# Patient Record
Sex: FEMALE | Race: WHITE | NOT HISPANIC OR LATINO | Employment: FULL TIME | URBAN - METROPOLITAN AREA
[De-identification: names, ages, dates, MRNs, and addresses within clinical notes are randomized per-mention and may not be internally consistent; named-entity substitution may affect disease eponyms.]

---

## 2022-09-25 LAB
ABO/RH(D): NORMAL
ANTIBODY SCREEN: NEGATIVE
SPECIMEN EXPIRATION DATE: NORMAL
SPECIMEN EXPIRATION DATE: NORMAL

## 2022-09-26 ENCOUNTER — DOCUMENTATION ONLY (OUTPATIENT)
Dept: MIDWIFE SERVICES | Facility: CLINIC | Age: 27
End: 2022-09-26

## 2022-09-26 ENCOUNTER — PRENATAL OFFICE VISIT (OUTPATIENT)
Dept: MIDWIFE SERVICES | Facility: CLINIC | Age: 27
End: 2022-09-26
Payer: COMMERCIAL

## 2022-09-26 ENCOUNTER — HOSPITAL ENCOUNTER (OUTPATIENT)
Dept: ULTRASOUND IMAGING | Facility: CLINIC | Age: 27
Discharge: HOME | End: 2022-09-26
Attending: MIDWIFE | Admitting: MIDWIFE
Payer: COMMERCIAL

## 2022-09-26 VITALS
DIASTOLIC BLOOD PRESSURE: 66 MMHG | BODY MASS INDEX: 31.7 KG/M2 | WEIGHT: 185.7 LBS | SYSTOLIC BLOOD PRESSURE: 106 MMHG | HEART RATE: 68 BPM | HEIGHT: 64 IN

## 2022-09-26 DIAGNOSIS — Z34.00 SUPERVISION OF NORMAL FIRST PREGNANCY, ANTEPARTUM: Primary | ICD-10-CM

## 2022-09-26 DIAGNOSIS — Z34.00 SUPERVISION OF NORMAL FIRST PREGNANCY, ANTEPARTUM: ICD-10-CM

## 2022-09-26 DIAGNOSIS — Z34.01 ENCOUNTER FOR SUPERVISION OF NORMAL FIRST PREGNANCY IN FIRST TRIMESTER: ICD-10-CM

## 2022-09-26 LAB
BASOPHILS # BLD AUTO: 0 10E3/UL (ref 0–0.2)
BASOPHILS NFR BLD AUTO: 0 %
EOSINOPHIL # BLD AUTO: 0.1 10E3/UL (ref 0–0.7)
EOSINOPHIL NFR BLD AUTO: 1 %
ERYTHROCYTE [DISTWIDTH] IN BLOOD BY AUTOMATED COUNT: 12.1 % (ref 10–15)
HBA1C MFR BLD: 5.3 %
HCT VFR BLD AUTO: 40.4 % (ref 35–47)
HGB BLD-MCNC: 13.7 G/DL (ref 11.7–15.7)
IMM GRANULOCYTES # BLD: 0.1 10E3/UL
IMM GRANULOCYTES NFR BLD: 1 %
LYMPHOCYTES # BLD AUTO: 2.7 10E3/UL (ref 0.8–5.3)
LYMPHOCYTES NFR BLD AUTO: 24 %
MCH RBC QN AUTO: 29.3 PG (ref 26.5–33)
MCHC RBC AUTO-ENTMCNC: 33.9 G/DL (ref 31.5–36.5)
MCV RBC AUTO: 87 FL (ref 78–100)
MONOCYTES # BLD AUTO: 0.8 10E3/UL (ref 0–1.3)
MONOCYTES NFR BLD AUTO: 7 %
NEUTROPHILS # BLD AUTO: 7.3 10E3/UL (ref 1.6–8.3)
NEUTROPHILS NFR BLD AUTO: 67 %
NRBC # BLD AUTO: 0 10E3/UL
NRBC BLD AUTO-RTO: 0 /100
PLATELET # BLD AUTO: 373 10E3/UL (ref 150–450)
RBC # BLD AUTO: 4.67 10E6/UL (ref 3.8–5.2)
WBC # BLD AUTO: 10.9 10E3/UL (ref 4–11)

## 2022-09-26 PROCEDURE — 86900 BLOOD TYPING SEROLOGIC ABO: CPT | Performed by: MIDWIFE

## 2022-09-26 PROCEDURE — 87591 N.GONORRHOEAE DNA AMP PROB: CPT | Performed by: MIDWIFE

## 2022-09-26 PROCEDURE — 86780 TREPONEMA PALLIDUM: CPT | Performed by: MIDWIFE

## 2022-09-26 PROCEDURE — 85025 COMPLETE CBC W/AUTO DIFF WBC: CPT | Performed by: MIDWIFE

## 2022-09-26 PROCEDURE — 87491 CHLMYD TRACH DNA AMP PROBE: CPT | Performed by: MIDWIFE

## 2022-09-26 PROCEDURE — 86901 BLOOD TYPING SEROLOGIC RH(D): CPT | Performed by: MIDWIFE

## 2022-09-26 PROCEDURE — 87389 HIV-1 AG W/HIV-1&-2 AB AG IA: CPT | Performed by: MIDWIFE

## 2022-09-26 PROCEDURE — 76801 OB US < 14 WKS SINGLE FETUS: CPT

## 2022-09-26 PROCEDURE — 99207 PR FIRST OB VISIT: CPT | Performed by: MIDWIFE

## 2022-09-26 PROCEDURE — 83036 HEMOGLOBIN GLYCOSYLATED A1C: CPT | Performed by: MIDWIFE

## 2022-09-26 PROCEDURE — 86762 RUBELLA ANTIBODY: CPT | Performed by: MIDWIFE

## 2022-09-26 PROCEDURE — G0145 SCR C/V CYTO,THINLAYER,RESCR: HCPCS | Performed by: MIDWIFE

## 2022-09-26 PROCEDURE — 86850 RBC ANTIBODY SCREEN: CPT | Performed by: MIDWIFE

## 2022-09-26 PROCEDURE — 36415 COLL VENOUS BLD VENIPUNCTURE: CPT | Performed by: MIDWIFE

## 2022-09-26 PROCEDURE — 86803 HEPATITIS C AB TEST: CPT | Performed by: MIDWIFE

## 2022-09-26 PROCEDURE — 87340 HEPATITIS B SURFACE AG IA: CPT | Performed by: MIDWIFE

## 2022-09-26 PROCEDURE — 87086 URINE CULTURE/COLONY COUNT: CPT | Performed by: MIDWIFE

## 2022-09-26 PROCEDURE — 99205 OFFICE O/P NEW HI 60 MIN: CPT | Performed by: MIDWIFE

## 2022-09-26 NOTE — PROGRESS NOTES
"PRENATAL VISIT   FIRST OBSTETRICAL EXAM - OB     Assessment / Impression   First prenatal visit at 10w3d  27yo       Last pap = 2019  BMI 32  EDPS = 3, \"0\" to #10    Plan:      -IOB labs drawn.   -Pt is not a candidate for drawing lead level per [facility] screening tool.   -Patient is interested in waterbirth.    -Reviewed prenatal care schedule.   -Optimal nutrition and weight gain discussed. Pregnancy weight gain of 11-20 lbs (BMI 30.0 or 34.9) encouraged.   -Anticipatory guidance for common pregnancy questions and concerns reviewed.   -Danger s/sx for this trimester reviewed with patient.   -Reviewed genetic screening options with patient, patient does elect for first trimester screening. The patient does not elect for quad screening.   -Reviewed carrier testing options with patient, patient does not elect for testing or referral to genetic counseling.  -IOB packet given and reviewed with patient.   -CNM services and hospital options reviewed; emergency and scheduling phone numbers given to patient.   -Because the patient does have 2 or more moderate risk factors, low-dose aspirin will be initiated at 12 weeks.   -Antepartum VTE risk factors absent.  -Patient is at increased risk for overt diabetes, so A1C will be added to IOB labs today.  -Pt is not a candidate for an antepartum OB consult.    -Return to clinic in 4 weeks or sooner as needed.    Total time: 75 minutes spent on the date of the encounter doing chart review, review of test results, patient visit and documentation.     Subjective:   Mirian Cordova is a 26 year old  here today for her first obstetrical exam at 10w3d. Here with  Dewayne. This pregnancy is planned Attempting pregnancy for 1 months. The patient reports nausea, but no vomiting, fatigue and some mild breast tenderness. She has a certain LMP Patient's last menstrual period was 07/15/2022 (exact date)., predicting an expected date of delivery of Estimated Date of Delivery: " Data Unavailable. Last period was normal. Her previous three cycles were absent due to hormonal IUD. Her pregnancy is dated by LMP.     The patient states that she is in a mutually monogamous relationship and states that she is safe. Offered GC/CT screening today and patient accepts.  Current symptoms also include: fatigue, frequent urination and nausea.     Risk factors: pre-pregnancy obesity. Pregnancy Risk Factors:patient reports the use of recreation drugs (THC and mushrooms) prior to positive pregnancy test       The patient has the following high risk factors for preeclampsia:none. The patient has the following moderate risk factors for preeclampsia:first pregnancy and BMI greater than 30.     The patient has the following antepartum risk factors for VTE (two or more risk factors, or 1 * risk factor, places patient at higher risk): current or anticipated antepartum bedrest/prolonged immobility > 24 hours and none.   Clinical history/risk factors requiring antepartum OB consult: none.     The patient has the following risk factors for overt diabetes: Body mass index greater than or equal to 25 kg/m2. Plus the additional risk factor(s) of: First-degree relative with diabetes (brother).     Social History:   Education level: post graduate   Occupation: speech & language pathologist   Partners name: Dewayne   ?   OB History    Para Term  AB Living   1 0 0 0 0 0   SAB IAB Ectopic Multiple Live Births   0 0 0 0 0      # Outcome Date GA Lbr Pedro/2nd Weight Sex Delivery Anes PTL Lv   1 Current                 History:   No past medical history on file.   Past Surgical History:   Procedure Laterality Date     wisdom teeth Bilateral       No family history on file.   Social History     Tobacco Use     Smoking status: Never Smoker     Smokeless tobacco: Never Used      Current Outpatient Medications   Medication Sig Dispense Refill     Prenatal Vit-Fe Fumarate-FA (PRENATAL VITAMIN PO) Take 1 split tablet  "by mouth daily        No Known Allergies     The patient's medical, surgical and family histories were reviewed and were pertinent to this visit.     Pap smear: Last Pap: 2019, Result: normal, Previous History: +HPV, laser surgery 2012       EPDS score today: 3.\"0\" to #10   History of anxiety or depression: yes    Review of Systems   General: Fatigue but otherwise denies problem   Eyes: Denies problem   Ears/Nose/Throat: Denies problem   Cardiovascular: Denies problem   Respiratory: Denies problem   Gastrointestinal: Nausea without vomiting, occasional constipation  Genitourinary: Denies any discharge, vaginal bleeding or itchiness or any other problem   Musculoskeletal: Breast tenderness otherwise denies problem   Skin: Denies problem   Neurologic: Denies problem   Psychiatric: Denies problem   Endocrine: Denies problem         Objective:   Objective    Vitals:    09/26/22 1546   BP: 106/66   Pulse: 68   Weight: 84.2 kg (185 lb 11.2 oz)   Height: 1.613 m (5' 3.5\")        Physical Exam:   General Appearance: Alert, cooperative, no distress, appears stated age   HEENT: Normocephalic, without obvious abnormality, atraumatic. Conjunctiva/corneas clear  Neck: Supple, symmetrical, trachea midline, no adenopathy.   Thyroid: not enlarged, symmetric, no tenderness/mass/nodules   Back: Symmetric, no curvature, ROM normal  Lungs: Clear to auscultation bilaterally, respirations unlabored   Heart: Regular rate and rhythm, S1 and S2 normal, no murmur,No edema to lower extremities.   Breasts: No breast masses, tenderness, asymmetry, or nipple discharge. Nipples are everted.   Abdomen: Gravid, soft, non-tender, bowel sounds active all four quadrants, no masses.   FHT: not audible   Vulva:  no sign of lesions or condyloma, normal hair distribution   Vagina: pink, normal rugae, copious white discharge  Cervix:  long/thick/closed, no lesions or inflammation noted, negative CMT with exam   Uterus: retroverted position, non tender, " enlarged approximately 10 weeks size   Adnexa:  no masses appreciated, non-tender with palpation   Musculoskeletal: Extremities normal, atraumatic, no cyanosis   Skin: Skin color, texture, turgor normal, no rashes or lesions   Lymph nodes: Cervical, supraclavicular, and axillary nodes normal   Neurologic: Alert and oriented x 3. Normal speech   Lab:   Results for orders placed or performed in visit on 09/26/22   CBC with Platelets & Differential     Status: None (In process)    Narrative    The following orders were created for panel order CBC with Platelets & Differential.  Procedure                               Abnormality         Status                     ---------                               -----------         ------                     CBC with platelets and d...[[id number]]                      In process                   Please view results for these tests on the individual orders.           Plan of care:  - Follow up ultrasound due to inaudible FHTs  - Planned f/u in 4 weeks for routine prenatal  - Patient encouraged to pursue flu vaccine & covid booster  - Plan to start ASA at 12 weeks due to 2 mod risk factors  - Follow up on utox, may need at next visit if unable to add on to IOB labs

## 2022-09-27 LAB
AMPHETAMINES UR QL SCN: ABNORMAL
BARBITURATES UR QL SCN: ABNORMAL
BENZODIAZ UR QL SCN: ABNORMAL
BZE UR QL SCN: ABNORMAL
CANNABINOIDS UR QL SCN: ABNORMAL
CREAT UR-MCNC: 158 MG/DL
HBV SURFACE AG SERPL QL IA: NONREACTIVE
HCV AB SERPL QL IA: NONREACTIVE
HIV 1+2 AB+HIV1 P24 AG SERPL QL IA: NONREACTIVE
OPIATES UR QL SCN: ABNORMAL
OXYCODONE UR QL: ABNORMAL
PCP QUAL URINE (ROCHE): ABNORMAL
RUBV IGG SERPL QL IA: 6 INDEX
RUBV IGG SERPL QL IA: POSITIVE
T PALLIDUM AB SER QL: NONREACTIVE

## 2022-09-27 PROCEDURE — 80307 DRUG TEST PRSMV CHEM ANLYZR: CPT | Performed by: MIDWIFE

## 2022-09-28 LAB
C TRACH DNA SPEC QL PROBE+SIG AMP: NEGATIVE
N GONORRHOEA DNA SPEC QL NAA+PROBE: NEGATIVE

## 2022-09-29 ENCOUNTER — TELEPHONE (OUTPATIENT)
Dept: MIDWIFE SERVICES | Facility: CLINIC | Age: 27
End: 2022-09-29

## 2022-09-29 LAB — BACTERIA UR CULT: NORMAL

## 2022-09-29 NOTE — TELEPHONE ENCOUNTER
Pls call pt re U/S results and if it changed her due date. She will be watching for a call yet this afternoon.

## 2022-09-30 ENCOUNTER — TELEPHONE (OUTPATIENT)
Dept: MIDWIFE SERVICES | Facility: CLINIC | Age: 27
End: 2022-09-30

## 2022-09-30 LAB
BKR LAB AP GYN ADEQUACY: NORMAL
BKR LAB AP GYN INTERPRETATION: NORMAL
BKR LAB AP HPV REFLEX: NORMAL
BKR LAB AP LMP: NORMAL
BKR LAB AP PREVIOUS ABNL DX: NORMAL
BKR LAB AP PREVIOUS ABNORMAL: NORMAL
PATH REPORT.COMMENTS IMP SPEC: NORMAL
PATH REPORT.COMMENTS IMP SPEC: NORMAL
PATH REPORT.RELEVANT HX SPEC: NORMAL

## 2022-09-30 NOTE — TELEPHONE ENCOUNTER
Spoke with patient about EDC based on early ultrasound. Patient accepting of this information.    Patient had NIPS drawn at visit based on her LMP, however, since her ultrasound showed an earlier gestation will need to contact company and cancel testing and have patient come back in for redraw.

## 2022-09-30 NOTE — TELEPHONE ENCOUNTER
I spoke to Mirian today. She has been notified that due to change in [employer] her NIPS test will need to be redrawn. She understood, and will schedule this today.

## 2022-09-30 NOTE — TELEPHONE ENCOUNTER
Attempted to all patient. Left message for patient to call back. EDC will be changed to 4/29/23 based on ultrasound due to an 8 day difference in dates. Chart was updated in dating section per JAYLA Calvo CNM

## 2022-09-30 NOTE — TELEPHONE ENCOUNTER
Pt is returning a call to the [facility] on call. It looks like AB called her. Please give her a call back

## 2022-10-03 ENCOUNTER — HEALTH MAINTENANCE LETTER (OUTPATIENT)
Age: 27
End: 2022-10-03

## 2022-10-04 ENCOUNTER — PRENATAL OFFICE VISIT (OUTPATIENT)
Dept: MIDWIFE SERVICES | Facility: CLINIC | Age: 27
End: 2022-10-04
Payer: COMMERCIAL

## 2022-10-04 DIAGNOSIS — Z34.01 ENCOUNTER FOR SUPERVISION OF NORMAL FIRST PREGNANCY IN FIRST TRIMESTER: ICD-10-CM

## 2022-10-04 DIAGNOSIS — Z34.80 SUPERVISION OF OTHER NORMAL PREGNANCY, ANTEPARTUM: Primary | ICD-10-CM

## 2022-10-04 PROCEDURE — 36415 COLL VENOUS BLD VENIPUNCTURE: CPT | Performed by: ADVANCED PRACTICE MIDWIFE

## 2022-10-08 PROBLEM — F12.10 CANNABIS ABUSE: Status: ACTIVE | Noted: 2022-10-08

## 2022-10-17 LAB — SCANNED LAB RESULT: NORMAL

## 2022-10-18 ENCOUNTER — TELEPHONE (OUTPATIENT)
Dept: MIDWIFE SERVICES | Facility: CLINIC | Age: 27
End: 2022-10-18

## 2022-10-18 ENCOUNTER — HOSPITAL ENCOUNTER (EMERGENCY)
Facility: CLINIC | Age: 27
Discharge: HOME | End: 2022-10-19
Attending: EMERGENCY MEDICINE | Admitting: EMERGENCY MEDICINE
Payer: COMMERCIAL

## 2022-10-18 DIAGNOSIS — N30.00 ACUTE CYSTITIS WITHOUT HEMATURIA: ICD-10-CM

## 2022-10-18 LAB
ALBUMIN UR-MCNC: 300 MG/DL
APPEARANCE UR: ABNORMAL
BILIRUB UR QL STRIP: NEGATIVE
COLOR UR AUTO: YELLOW
GLUCOSE UR STRIP-MCNC: NEGATIVE MG/DL
HGB UR QL STRIP: ABNORMAL
KETONES UR STRIP-MCNC: 40 MG/DL
LEUKOCYTE ESTERASE UR QL STRIP: ABNORMAL
MUCOUS THREADS #/AREA URNS LPF: PRESENT /LPF
NITRATE UR QL: NEGATIVE
PH UR STRIP: 6 [PH] (ref 5–7)
RBC URINE: >182 /HPF
SP GR UR STRIP: 1.02 (ref 1–1.03)
SQUAMOUS EPITHELIAL: 2 /HPF
UROBILINOGEN UR STRIP-MCNC: <2 MG/DL
WBC URINE: >182 /HPF

## 2022-10-18 PROCEDURE — 87086 URINE CULTURE/COLONY COUNT: CPT | Performed by: STUDENT IN AN ORGANIZED HEALTH CARE EDUCATION/TRAINING PROGRAM

## 2022-10-18 PROCEDURE — 250N000013 HC RX MED GY IP 250 OP 250 PS 637: Performed by: EMERGENCY MEDICINE

## 2022-10-18 PROCEDURE — 81001 URINALYSIS AUTO W/SCOPE: CPT | Performed by: STUDENT IN AN ORGANIZED HEALTH CARE EDUCATION/TRAINING PROGRAM

## 2022-10-18 PROCEDURE — 99283 EMERGENCY DEPT VISIT LOW MDM: CPT

## 2022-10-18 RX ORDER — CEPHALEXIN 500 MG/1
1000 CAPSULE ORAL ONCE
Status: COMPLETED | OUTPATIENT
Start: 2022-10-19 | End: 2022-10-18

## 2022-10-18 RX ORDER — CEPHALEXIN 500 MG/1
1000 CAPSULE ORAL 2 TIMES DAILY
Qty: 40 CAPSULE | Refills: 0 | Status: SHIPPED | OUTPATIENT
Start: 2022-10-18 | End: 2022-10-25

## 2022-10-18 RX ADMIN — CEPHALEXIN 1000 MG: 500 CAPSULE ORAL at 23:57

## 2022-10-18 NOTE — TELEPHONE ENCOUNTER
Pt has some blood when she went to the bathroom and is having frequent urination. She thinks it is a UTI. Please call.

## 2022-10-19 VITALS
HEART RATE: 69 BPM | SYSTOLIC BLOOD PRESSURE: 112 MMHG | DIASTOLIC BLOOD PRESSURE: 62 MMHG | HEIGHT: 63 IN | WEIGHT: 180 LBS | BODY MASS INDEX: 31.89 KG/M2 | OXYGEN SATURATION: 99 % | RESPIRATION RATE: 18 BRPM | TEMPERATURE: 97.7 F

## 2022-10-19 NOTE — TELEPHONE ENCOUNTER
A: 1.  26-year-old  with IUP at 12+3 weeks  2.  Dysuria  3.  Urinary frequency  4.  Gross hematuria  5.  History of recurrent UTI    P: Considering the time of day, she could try [place] [facility] in order to obtain a urine sample, treat UTI symptoms, and ensure proper antimicrobial therapy once the culture and sensitivity returns.  Explained we do not treat empirically during pregnancy.  This patient may do well on suppression therapy during pregnancy considering history of recurrent UTI.  She has verbal understanding of and agrees with the plan of care.    S: Patient is calling complaining of dysuria, urinary frequency (every 2 to 3 minutes to the bathroom), small pea-sized blood clot noted at the bottom of the toilet after urinating.  History of recurrent UTI, probably 13 total in her life.  She denies lower abdominal cramping or bleeding from the vagina.  She is O- blood type.    O: Alert and in no apparent distress

## 2022-10-19 NOTE — ED PROVIDER NOTES
EMERGENCY DEPARTMENT ENCOUNTER            IMPRESSION:  First semester pregnancy  Uncomplicated urinary tract        MEDICAL DECISION MAKING:  Patient evaluated for symptoms of urinary tract infection.  She has first trimester pregnancy.  No fever or systemic signs of illness.  No OB GYN symptoms.    On exam she does not appear ill.  Her vital signs are normal.  There is no flank or suprapubic tenderness.    Urinalysis is consistent with infection.  Was given first dose of antibiotics    No evidence of pyelonephritis or sepsis.    Patient discharged home with antibiotics and return precaution          =================================================================  CHIEF COMPLAINT:  Chief Complaint   Patient presents with     Dysuria     Rule out Urinary Tract Infection         HPI  Mirian Cordova is a 26 year old female with a history of cannabis abuse who presents to the ED by private vehicle for evaluation of dysuria.     Patient reports dysuria and increase in urinary frequency starting at 12:00PM this afternoon. Additionally endorses small amount of blood in urine. Notes she is urinating every three minutes. Patient is currently 12 weeks pregnant with her first child and was referred to ED by midwife to rule out UTI. Of note, patient was diagnosed with chronic UTIs in 2018.     Denies fevers, chills, shortness of breath, vision changes, light headedness, and back pain.     I, Mavis Pittman am serving as a scribe to document services personally performed by Dr. Loi Kamara MD, based on my observation and the provider's statements to me. I, Dr. Loi Kamara MD attest that Mavis Pittman is acting in a scribe capacity, has observed my performance of the services and has documented them in accordance with my direction.      REVIEW OF SYSTEMS   Constitutional: Denies fever, chills, unintentional weight loss or fatigue   Eyes: Denies visual changes or discharge    HENT: Denies sore throat, ear pain or  "neck pain  Respiratory: Denies cough or shortness of breath    Cardiovascular: Denies chest pain, palpitations or leg swelling  GI: Denies abdominal pain, nausea, vomiting, or dark, bloody stools.    : Endorses hematuria, dysuria., frequency   Musculoskeletal: Denies any new back pain or new muscle/joint pains  Skin: Denies rash or wound  Neurologic: Denies current headache, new weakness, focal weakness, or sensory changes    Lymphatic: Denies swollen glands    Psychiatric: Denies depression, suicidal ideation or homicidal ideation.    Remainder of systems reviewed, unless noted in HPI all others negative.      PAST MEDICAL HISTORY:  No past medical history on file.    PAST SURGICAL HISTORY:  Past Surgical History:   Procedure Laterality Date     LASER ABLATION OF THE CERVIX  2012     wisdom teeth Bilateral 2011         CURRENT MEDICATIONS:    cephALEXin (KEFLEX) 500 MG capsule  Prenatal Vit-Fe Fumarate-FA (PRENATAL VITAMIN PO)        ALLERGIES:  No Known Allergies    FAMILY HISTORY:  Family History   Problem Relation Age of Onset     Cancer Mother      Substance Abuse Mother      Hypertension Father      Diabetes Type 1 Brother      Substance Abuse Brother      Early Death Brother      Substance Abuse Maternal Grandmother      Alcoholism Paternal Grandfather      Cancer Paternal Grandfather      Hypertension Paternal Grandfather        SOCIAL HISTORY:   Social History     Socioeconomic History     Marital status:      Spouse name: Dewayne     Highest education level: Master's degree (e.g., MA, MS, Kelley, MEd, MSW, YOAN)   Occupational History     Occupation: Speech Language Pathologist     Employer: [employer]   Tobacco Use     Smoking status: Never     Smokeless tobacco: Never       PHYSICAL EXAM:    /86   Pulse 92   Temp 97.7  F (36.5  C) (Temporal)   Resp 18   Ht 1.6 m (5' 3\")   Wt 81.6 kg (180 lb)   LMP 07/15/2022 (Exact Date)   SpO2 98%   BMI 31.89 kg/m  "     Constitutional: Awake, alert, in no acute distress  Head: Normocephalic, atraumatic.  Respiratory:  no acute respiratory distress.  Cardiovascular: Regular    GI: Abdomen soft, non-tender to palpation in all 4 quadrants. No guarding or rebound. Bowel sounds intact on all 4 quadrants.   Back: No CVA tenderness.    Lymphatic: No cervical lymphadenopathy  Neurologic: Alert & oriented x 3. Normal speech.   Psychiatric: Normal mood and affect. Normal judgement.    ED COURSE:    11:07 PM: I met with the patient to gather history and to perform my initial exam. We discussed plans for the ED course, including diagnostic testing and treatment.       LAB:  All pertinent labs reviewed and interpreted.  Results for orders placed or performed during the hospital encounter of 10/18/22   UA with Microscopic reflex to Culture    Specimen: Urine, Clean Catch   Result Value Ref Range    Color Urine Yellow Colorless, Straw, Light Yellow, Yellow    Appearance Urine Cloudy (A) Clear    Glucose Urine Negative Negative mg/dL    Bilirubin Urine Negative Negative    Ketones Urine 40 (A) Negative mg/dL    Specific Gravity Urine 1.021 1.001 - 1.030    Blood Urine 1.0 mg/dL (A) Negative    pH Urine 6.0 5.0 - 7.0    Protein Albumin Urine 300 (A) Negative mg/dL    Urobilinogen Urine <2.0 <2.0 mg/dL    Nitrite Urine Negative Negative    Leukocyte Esterase Urine 500 Dejuan/uL (A) Negative    Mucus Urine Present (A) None Seen /LPF    RBC Urine >182 (H) <=2 /HPF    WBC Urine >182 (H) <=5 /HPF    Squamous Epithelials Urine 2 (H) <=1 /HPF       MEDICATIONS GIVEN IN THE EMERGENCY:  Medications   cephALEXin (KEFLEX) capsule 1,000 mg (has no administration in time range)           NEW PRESCRIPTIONS STARTED AT TODAY'S ER VISIT:  New Prescriptions    CEPHALEXIN (KEFLEX) 500 MG CAPSULE    Take 2 capsules (1,000 mg) by mouth 2 times daily for 7 days          FINAL DIAGNOSIS:    ICD-10-CM    1. Acute cystitis without hematuria  N30.00                At the  conclusion of the encounter I discussed the results of all of the tests and the disposition. The questions were answered. The patient or family acknowledged understanding and was agreeable with the care plan.     NAME: Mirian Cordova  AGE: 26 year old female  YOB: 1995  MRN: [id number]  EVALUATION DATE & TIME: 10/18/2022 11:05 PM    PCP: María Elena Ref-Primary, Physician    ED PROVIDER: Loi Kamara M.D.      I, Mavis Pittman, am serving as a scribe to document services personally performed by Dr. Loi Kamara based on my observation and the provider's statements to me. I, Loi Kamara MD attest that Mavis Pittman is acting in a scribe capacity, has observed my performance of the services and has documented them in accordance with my direction.    Loi Kamara M.D.  Emergency Medicine  [facility] EMERGENCY ROOM  [facility] [facility] [address]  [phone]  Dept: [phone]  10/18/2022         Loi Kamara MD  10/19/22 0000

## 2022-10-19 NOTE — ED TRIAGE NOTES
Pt presents to the ED with c/o pain with urination and urine frequency. Pt 12 weeks pregnant and sent here by midwife to rule out UTI. Pt reports small amount of blood in urine. Denies fevers.

## 2022-10-20 ENCOUNTER — PRENATAL OFFICE VISIT (OUTPATIENT)
Dept: MIDWIFE SERVICES | Facility: CLINIC | Age: 27
End: 2022-10-20
Payer: COMMERCIAL

## 2022-10-20 VITALS
DIASTOLIC BLOOD PRESSURE: 70 MMHG | TEMPERATURE: 98.9 F | HEART RATE: 76 BPM | SYSTOLIC BLOOD PRESSURE: 102 MMHG | BODY MASS INDEX: 32.27 KG/M2 | HEIGHT: 63 IN | WEIGHT: 182.1 LBS

## 2022-10-20 DIAGNOSIS — R35.0 URINARY FREQUENCY: ICD-10-CM

## 2022-10-20 DIAGNOSIS — R30.0 DYSURIA: Primary | ICD-10-CM

## 2022-10-20 DIAGNOSIS — Z3A.12 12 WEEKS GESTATION OF PREGNANCY: ICD-10-CM

## 2022-10-20 LAB
ALBUMIN UR-MCNC: 30 MG/DL
APPEARANCE UR: CLEAR
BACTERIA UR CULT: NORMAL
BILIRUB UR QL STRIP: NEGATIVE
COLOR UR AUTO: YELLOW
GLUCOSE UR STRIP-MCNC: NEGATIVE MG/DL
HGB UR QL STRIP: ABNORMAL
KETONES UR STRIP-MCNC: NEGATIVE MG/DL
LEUKOCYTE ESTERASE UR QL STRIP: NEGATIVE
NITRATE UR QL: NEGATIVE
PH UR STRIP: 5.5 [PH] (ref 5–8)
SP GR UR STRIP: >=1.03 (ref 1–1.03)
UROBILINOGEN UR STRIP-ACNC: 0.2 E.U./DL

## 2022-10-20 PROCEDURE — 99213 OFFICE O/P EST LOW 20 MIN: CPT | Performed by: ADVANCED PRACTICE MIDWIFE

## 2022-10-20 PROCEDURE — 87086 URINE CULTURE/COLONY COUNT: CPT | Performed by: ADVANCED PRACTICE MIDWIFE

## 2022-10-20 PROCEDURE — 81003 URINALYSIS AUTO W/O SCOPE: CPT | Performed by: ADVANCED PRACTICE MIDWIFE

## 2022-10-20 NOTE — PROGRESS NOTES
"Subjective:   Mirian Cordova is a 26 year old  at 12w5d here for problem visit in pregnancy/follow up from ED. She reports dysuria and increased urge/frequency. Seeing yellowish mucous with urination as well. States this is the first UTI she has had in pregnancy. Has had UTIs in the past outside of pregnancy. Has never had a stone or kidney infection.     Was seen in ED for dysuria and hematuria on 10/18/22. She was started on a 7-day course of Keflex. UC came back with mixture of >100,000 CFU/mL urogenital organisms.     Today, she denies hematuria, flank pain, fever. The pain is more of a discomfort and pressure sensation at the end of urinating. She feels she has a head cold so her immune system is down a little as well from that. States she started to feel better the day after the ED visit but today her symptoms are back again. The pain is not as bad as it was when she went to the ED.    Objective:  /70   Pulse 76   Temp 98.9  F (37.2  C) (Oral)   Ht 1.6 m (5' 3\")   Wt 82.6 kg (182 lb 1.6 oz)   LMP 07/15/2022 (Exact Date)   BMI 32.26 kg/m      Exam:  Constitutional: alert and in no acute distress, does not appear ill.  Head: Normocephalic. No masses, lesions, tenderness or abnormalities  Respiratory: Breathing unlabored.  Gastrointestinal: Abdomen soft, non-tender.   : Deferred  Musculoskeletal: Negative CVAT, extremities normal, no gross deformities noted, gait normal and normal muscle tone  Skin: no suspicious lesions or rashes  Neurologic: Gait normal. Reflexes normal and symmetric. Sensation grossly WNL.  Psychiatric: mentation appears normal and affect normal/bright    FHTs: 135.  TWG: -1.633 kg (-3 lb 9.6 oz)    Assessment:  26 year old  at 12w5d  Dysuria  Urinary frequency  Likely UTI  Afebrile    Plan:  Will get repeat urine collection today due to likely contamination with last collection in ED and continued symptoms. When asked how she is collecting the urine sample, found " that she is placing urine cup directly on labia likely resulting in contamination. Explained how to properly collect clean catch urine and she will do this today. Advised she continue to take Keflex as prescribed and we will await UC results from today to see if we need to change/modify her antibiotic. Also advised on OTC Tylenol and AZO for symptom management, hydration, and warning signs to watch for with worsening condition. Aware that AZO can turn urine bright orange.      20 minutes on the date of the encounter doing chart review, review of test results, patient visit and documentation    Wendy Jonas CNM

## 2022-10-22 LAB — BACTERIA UR CULT: NO GROWTH

## 2022-10-25 ENCOUNTER — PRENATAL OFFICE VISIT (OUTPATIENT)
Dept: MIDWIFE SERVICES | Facility: CLINIC | Age: 27
End: 2022-10-25
Payer: COMMERCIAL

## 2022-10-25 VITALS
WEIGHT: 184.2 LBS | HEIGHT: 63 IN | BODY MASS INDEX: 32.64 KG/M2 | DIASTOLIC BLOOD PRESSURE: 60 MMHG | HEART RATE: 64 BPM | SYSTOLIC BLOOD PRESSURE: 114 MMHG

## 2022-10-25 DIAGNOSIS — Z34.80 SUPERVISION OF OTHER NORMAL PREGNANCY, ANTEPARTUM: Primary | ICD-10-CM

## 2022-10-25 DIAGNOSIS — F12.10 CANNABIS ABUSE: ICD-10-CM

## 2022-10-25 DIAGNOSIS — Z23 NEED FOR PROPHYLACTIC VACCINATION AND INOCULATION AGAINST INFLUENZA: ICD-10-CM

## 2022-10-25 PROCEDURE — 90686 IIV4 VACC NO PRSV 0.5 ML IM: CPT | Performed by: ADVANCED PRACTICE MIDWIFE

## 2022-10-25 PROCEDURE — 99207 PR PRENATAL VISIT: CPT | Performed by: ADVANCED PRACTICE MIDWIFE

## 2022-10-25 PROCEDURE — 90471 IMMUNIZATION ADMIN: CPT | Performed by: ADVANCED PRACTICE MIDWIFE

## 2022-10-25 NOTE — PATIENT INSTRUCTIONS
"[facility]- Contact information:  Appointment line and to get a hold of CNM in clinic Monday-Friday 8 am - 5 pm:  [phone].  There are some clinics with early start times (1st appointment 7:40 am) and others with evening hours (last appointment 6:20 pm).  Most are typically open from 8 am to 5 pm.    CNM on call answering service: [phone].  Specify your hospital of choice and leave a brief message for CNM;  will then page CNM who is on call at your specified hospital and you should receive a call back with 15 minutes.  Be sure that your ringer is audible and that you can accept blocked calls so that we can get back in touch with you! This number should be reserved for urgent needs if during the day, before 8 am, after 5 pm, weekends, holidays.    Contact the on-call CNM with warning signs, such as:  vaginal bleeding   Vaginal discharge and itching or pain and burning during urination  Leg/calf pain or swelling on one side  severe abdominal pain  nausea and vomiting more than 4-5 times a day, or if you are unable to keep anything down  fever more than 100.4 degrees F.     Spruce [employer]  After each of your visits you are welcome to check [employer]stitute for your visit summary including education and links to information relevant to your pregnancy and/or well woman care.   Find the \"Visits\" tab at the top of the page, you will see a list of recent visits and for each visit a for link for \"View After Visit Summary.\" View of your After Visit Summary will allow you to read our recommendations from your visit, review any education provided, and link to websites with useful information.   If you have any questions or difficulty navigating NBA [employer]ops, please feel free to contact us and we will do our best to direct you.        Touring the Maternity Care Center  At this time we are offering a virtual tour of the Maternity Care Centers at both [facility] and [facility]:   [facility] " "Hospital Maternity Care:   https://[facility].org/locations/the-birthplace-at--[facility] Maternity Care:   https://[facility].org/locations/the-birthplace-at--[facility]         Meet the Midwives from [facility]  You are invited to an informational meet and greet with [facility] Certified Nurse-Midwives. Our free \"Meet the Midwives\" event is a great opportunity to learn about our midwives' philosophy and experience, the hospitals where we can assist with your birth, and answer questions you may have. Partners, friends, and family are welcome to attend. Currently, this is a virtual event.  Date  First Tuesday of every month at 7 pm.    Link to next (live) meeting  https://www.[email or web address]/classes-and-events/meet-the-midwives-from-[facility]  To Join by Telephone (audio only) Call:   [phone] Phone Conference ID: [phone]#        Ultrasound Appointment:   Don't forget to schedule your ultrasound appointment around 20 weeks into your pregnancy. Your midwife will order the exam for you to schedule at [phone] with [facility] radiology locations or at the independent radiology clinic of your preference.      GENETIC SCREENING OPTIONS AT [facility]        All testing is optional. We don t recommend or discourage any test; it is totally up to you and your partner. Some couples wish to know their risk of having a baby with a genetic defect and others do not. We will support your decision. Abnormal results may lead to a discussion of options for further testing.  Accurate dating of your pregnancy is important for all testing so an ultrasound may be done prior to referral or testing.  No testing provides certainty; there are false positives and negatives associated with all testing, some more than others.  Most genetic testing is non-invasive (requires only a blood sample and sometimes " an ultrasound or both).  It is always wise to check with your insurance carrier before proceeding.  Some testing can be done at our lab and some require a referral.  If you decide to do no testing, the 20 week ultrasound scan has some ability to detect abnormalities in the baby.    [facility] or [employer]  At 10 wk or greater, a blood sample can be drawn here at clinic or at any of our referral offices. It will provide highly accurate results with low false positive rates for trisomy 18, trisomy 21 (Down Syndrome), and trisomy 13 (> 99% trisomy detection rate at a false positive rate of <0.1%). Gender identification can also be obtained if desired.    Quad Screen (4-marker screen)  Between 15 and 21 weeks, a sample of blood can be drawn at our lab to assess your risk of having a baby with Down Syndrome, Trisomy 18 and neural tube defects. Such testing is able to detect these conditions in 80-90% of cases and the false-positive rate is approximately 5%.     Why is dental care in pregnancy important?  During pregnancy, you are more likely to have problems with your teeth or gums. If you have an infection in your teeth or gums, the chance of your baby being premature (born early) or having low birth weight may be slightly higher than if your teeth and gums are healthy  Dental care is safe during pregnancy and important for the health of you and your baby.   What should you know before you see the dentist?  Make sure your dentist knows that you are pregnant.  If medications for infection or for pain are needed, your dentist can prescribe ones that are safe for you and your baby.  Tell your dentist about any changes you have noticed since you became pregnant and about any medications r or supplements you are taking.  Routine x-rays should be avoided in pregnancy, but it may be necessary if there is a problem or an emergency.   Your body should be covered with a lead apron to protect you and your baby.  Dental work can be done  safely at any point in pregnancy. If possible, it is best to delay treatments and pro- cedures until after the first trimester.    For more information on dental health in pregnancy: http://onlinelibrary.dubois.com/store/10.1111/jmwh.30355/asset/unvp65570.pdf?v=1&t=cgxz1l73&z=[email or web address]     Quickening:   Your Baby in the Second Trimester of Pregnancy  At the start of the second trimester, you will feel your baby's movements, which get stronger as the baby grows bigger. At the end of the fourth month, your baby weighs about five ounces. Her kidneys begin to produce urine. During visits to your health care provider, you will be able to hear your baby's heartbeat more clearly. Your baby can move and hear your voice.   By the end of the fifth month, you'll be able to feel light movements (called quickening) of your fetus. Your baby is sleeping and waking at regular intervals, and is more active than before. At this point, she is about nine inches long and weighs about one-half to one pound. During the sixth month, your baby's features become clearer. Eyebrows, eyelashes, and hair are developing. She also has finger and toe prints, and may be kicking strongly.  By the end of the second trimester, your baby weighs as much as two pounds and is about 11 inches long.         Gestational diabetes  Gestational diabetes develops during pregnancy (gestation). Like other types of diabetes, gestational diabetes affects how your cells use sugar (glucose). Gestational diabetes causes high blood sugar that can affect your pregnancy and your baby's health.  Any pregnancy complication is concerning, but there's good news. Expectant moms can help control gestational diabetes by eating healthy foods, exercising and, if necessary, taking medication. Controlling blood sugar can prevent a difficult birth and keep you and your baby healthy.  In gestational diabetes, blood sugar usually returns to normal soon  after delivery. But if you've had gestational diabetes, you're at risk for type 2 diabetes. You'll continue working with your health care team to monitor and manage your blood sugar.    Who is at risk?  This is a list of factors that increase the risk of developing gestational diabetes for women during pregnancy:      Overweight prior to pregnancy (20% or more over ideal body weight)      High risk ethnic group: , , ,       Impaired glucose tolerance or traces of glucose in the urine      Family history of diabetes      Previously giving birth to a baby over 9 lbs. or stillborn      Previous pregnancy with gestational diabetes    Prevention:  There are no guarantees when it comes to preventing gestational diabetes -- but the more healthy habits you can adopt before pregnancy, the better. If you've had gestational diabetes, these healthy choices may also reduce your risk of having it in future pregnancies or developing type 2 diabetes down the road.  Eat healthy foods. Choose foods high in fiber and low in fat and calories. Focus on fruits, vegetables and whole grains. Strive for variety to help you achieve your goals without compromising taste or nutrition. Watch portion sizes.   Keep active. Exercising before and during pregnancy can help protect you from developing gestational diabetes. Aim for 30 minutes of moderate activity on most days of the week. Take a brisk daily walk. Ride your bike. Swim laps.  If you can't fit a single 30-minute workout into your day, several shorter sessions can do just as much good. Park in the distant lot when you run errands. Get off the bus one stop before you reach your destination. Every step you take increases your chances of staying healthy.  Lose excess pounds before pregnancy. Doctors don't recommend weight loss during pregnancy. But if you're planning to get pregnant, losing extra weight beforehand may help you have a healthier  pregnancy.  Focus on permanent changes to your eating habits. Motivate yourself by remembering the long-term benefits of losing weight, such as a healthier heart, more energy and improved self-esteem.    Preventing Diabetes after Pregnancy:  It is estimated that 35-60 percent of women that have had gestational diabetes will develop type 2 diabetes in the future. It is also thought that children from these pregnancies have a greater chance of developing obesity and type 2 diabetes.    If you do have prediabetes or have risk factors for having diabetes, research shows that doing just two things can help you prevent or delay type 2 diabetes: Lose 5% to 7% of your body weight, which would be 10 to 14 pounds for a 200-pound person; and get at least 150 minutes each week of physical activity, such as brisk walking.    RESOURCES    Breastfeeding Information:  OUTPATIENT LACTATION RESOURCES    -Schedule a clinic appointment with a [facility] with dedicated clinic hours for breastfeeding assistance by calling [phone]. Breastfeeding clinic visits are at [facility] on , [facility] on  and [facility] on .     New Parent Connection:   [facility], [address] [place] [address]  In-person meetings on  from 6 pm - 7:15 pm for parents of  to 9 months, at the same site.   All are free, drop-in, no registration required.    There are also free virtual meetings ongoing on :  11:30 am - 12:30 pm for parents of newborns to 3 months  4:15 pm to 5:15 pm for parents of 3 to 9-month olds  For joining info parents should call Yen Stone at [phone]    [facility] Baby Café  Due to COVID-19, all Baby Café sessions are canceled until further notice. For lactation support, please contact one of our bilingual staff:  Lola (IBCLC) [phone]  Jaimie (IBCLC/ Maltese) [phone]  Wil (Hmong) [phone]  Dwayne (Dominican)  [phone]  Baby Café is a free, drop-in service offering breastfeeding/chestfeeding support. Come share tips and socialize with other pregnant, breastfeeding/chestfeeding families. Babies and siblings are welcome (no  available).  We offer:  Professionally trained lactation staff.  Resource books for lending.  Relaxed and fun atmosphere.  Refreshments.  Locations  Baby Café is offered at several locations.  Please see below for the Baby Café closest to you.  CANCELED UNTIL FURTHER NOTICE  [facility]  [address] [address]  1st Wednesdays of the month   10 a.m. - Noon  CANCELED UNTIL FURTHER NOTICE  [facility]  [address], [place], [address]  4th Wednesdays of the month   10 a.m. - 12:30 p.m.     CANCELED UNTIL FURTHER NOTICE  [facility]  [address], [place], [address]  4th Wednesdays of the month  4 - 6 p.m.  CANCELED UNTIL FURTHER NOTICE  [facility] [address], [place], [address]  2nd Thursdays of the month.  9:30-11:30 a.m.  Enter through the east end of the building, the blue Door C.  Ring the ECFE buzzer to be let in.   More information  Jaimie Ballesteros  [phone]  [email or web address].     -Attend a baby weigh in at [facility].  Lactation consultants are available to answer questions  [facility]: Tuesdays 1:00 - 2:00  [facility]: Mondays 1:00 - 2:00   www.[facility].[employer] FP    -Attend one of the New Mama groups at [facility] in [facility].  [facility] also offers one-on-one in home and in office lactation consults.   www.[facility]    -Attend a LeLeche League meeting.  Multiple groups in several locations throughout the [facility]. The meetings are no-cost and always informative breastfeeding education session through [employer] La Leche League  Www.lllondas.org/  Medication use while breastfeeding:  http://toxnet.nlm.nih.gov/newtoxnet/lactmed.htm     Childbirth and Parenting Education:     Everyday Miracles:   https://www.everyday-miracles.org/    Free Video Series from [facility]: https://nursing.[facility]/academics/specialty-areas/nurse-midwifery/having-baby-prenatal-videos/having-baby-prenatal-and    MIKE parenting [place]: http://Ach[facility] PharmaceuticalsMemorial Medical CenterVehrity/   [phone]) 186-UEEH  [place]: (education, yoga & wellness) www.[employer]  Enlightened Mama: www.enlightenedmama.TermSync   Childbirth collective: (Parent topic nights)  www.childbirthcollective.org/  Hypnobabies:  www.hypnobabiestwincities.TermSync/  Hypnobirthing:  Http://hypnobirthing.TermSync/  The Birth Hour: https://[employer]/online-childbirth-class/    APPS and Podcasts:   Ryne Adams Nurture    Evidence Based Birth  The Birth Hour (for birth stories)   Birthful   Expectful   The Longest Shortest Time  PregnancyPodcast Yelitza Person    Book Recommendations:   Patsy [place]'s Birthing From [facility]--first few chapters include a new-age tone, you may prefer to skip it and keep going, because there is good stuff later.  This book recommendation covers emotional preparation, but does cover coping with pain, and use of both pharmacological and nonpharmacological methods.    Dr. Evans' The Pregnancy Book and The Birth Book--the pregnancy book goes month-by month    The Birth Partner by Sushma López    Womanly Art of Breastfeeding by La Leche League International   Bestfeeding by Hannah Smiley--great pictures    Mothering Your Nursing Toddler, by Eloise Momin.   Addresses dealing with so many of the challenging behaviors of a nursing toddler.  How Weaning Happens, by La Leche League.  Discusses weaning at all ages, from medically necessary weaning of an infant, all the way up to age 5 (or older), with why/why not, and strategies.  Very empowering book both for deciding to wean and deciding not to.    American College of Nurse-Midwives (ACNM)  "http://www.midwife.org/; look at the informational handouts at http://www.midwife.org/Share-With-Women     www.mymidwife.org    Mother to Baby (Medication and Herbal guidance in pregnancy): http://www.mothertobaby.org  Toll-Free Hotline: 155.562.8946  LactMed (Medication use while breastfeeding): http://toxnet.nlm.nih.gov/newtoxnet/lactmed.htm    Women's Health.gov:  http://www.womenshealth.gov/a-z-topics/index.html    American pregnancy association - http://americanpregnancy.org    Centering Pregnancy (group prenatal care option): http://centeringhealthcare.org    Information about doulas:  Childbirth collective: http://www.childbirthcollective.org/  Doulas of [place] (EFRA):  www.efra.org  [facility]  project: http://Etopustiesdoulaproject.LP33.TV/     Early Childhood and Family Education (ECFE):  ECFE offers parents hands-on learning experiences that will nourish a lifetime of teachable moments.  http://ecfe.info/ecfe-home/    March of Dimes www.KOPIS MOBILE.LP33.TV     FDA - Nutrition  www.mypyramid.gov  Under \"For Consumers,\" click on \"pregnant and breastfeeding women.\"      Centers for Disease Control and Prevention (CDC) - Vaccines : http://www.cdc.gov/vaccines/       When researching information on the web, question the validity of websites.  The domains .gov, .edu and.org tend to be more reliable information.  If there are a lot of advertisements, be cautious of the information provided. Stay away from blogs and chat rooms please!    "

## 2022-10-25 NOTE — PROGRESS NOTES
Mirian is a  at 13w3d who presents to clinic today for a routine prenatal visit.      Exam:  see prenatal flowsheet    The following topics were covered in today's visit:  1. Reviewed negative UC from last visit (this was obtained during follow up visit from ED as sample appeared to have high vaginal contamination).  On day 7 of antibiotics today, and have told can stop today   2. Flu shot today.  3. Reviewed option of AFP screening.  Will consider.  Can ask again at next visit.    4. Reviewed normal NIPS  5. Referral sent for 20 week US.  Patient will self-schedule.    6. COVID-19 Mitigation:  COVID vaccinated X3.  Discussed COVID booster, and plans to get this.

## 2022-12-06 ENCOUNTER — PRENATAL OFFICE VISIT (OUTPATIENT)
Dept: MIDWIFE SERVICES | Facility: CLINIC | Age: 27
End: 2022-12-06
Payer: COMMERCIAL

## 2022-12-06 VITALS
DIASTOLIC BLOOD PRESSURE: 70 MMHG | HEART RATE: 64 BPM | BODY MASS INDEX: 32.78 KG/M2 | WEIGHT: 185 LBS | SYSTOLIC BLOOD PRESSURE: 100 MMHG | HEIGHT: 63 IN

## 2022-12-06 DIAGNOSIS — Z34.80 SUPERVISION OF OTHER NORMAL PREGNANCY, ANTEPARTUM: Primary | ICD-10-CM

## 2022-12-06 DIAGNOSIS — F12.10 CANNABIS ABUSE: ICD-10-CM

## 2022-12-06 LAB
AMPHETAMINES UR QL SCN: NORMAL
BARBITURATES UR QL SCN: NORMAL
BENZODIAZ UR QL SCN: NORMAL
BZE UR QL SCN: NORMAL
CANNABINOIDS UR QL SCN: NORMAL
CREAT UR-MCNC: 81.7 MG/DL
OPIATES UR QL SCN: NORMAL
OXYCODONE UR QL: NORMAL
PCP QUAL URINE (ROCHE): NORMAL

## 2022-12-06 PROCEDURE — 36415 COLL VENOUS BLD VENIPUNCTURE: CPT | Performed by: MIDWIFE

## 2022-12-06 PROCEDURE — 99000 SPECIMEN HANDLING OFFICE-LAB: CPT | Performed by: MIDWIFE

## 2022-12-06 PROCEDURE — 80307 DRUG TEST PRSMV CHEM ANLYZR: CPT | Performed by: MIDWIFE

## 2022-12-06 PROCEDURE — 99207 PR PRENATAL VISIT: CPT | Performed by: MIDWIFE

## 2022-12-06 PROCEDURE — 82105 ALPHA-FETOPROTEIN SERUM: CPT | Mod: 90 | Performed by: MIDWIFE

## 2022-12-06 RX ORDER — ASPIRIN 81 MG/1
1 TABLET, CHEWABLE ORAL DAILY
COMMUNITY
Start: 2022-10-17 | End: 2023-05-11

## 2022-12-06 NOTE — PATIENT INSTRUCTIONS
"[facility]- Contact information:  Appointment line and to get a hold of CNM in clinic Monday-Friday 8 am - 5 pm:  [phone].  There are some clinics with early start times (1st appointment 7:40 am) and others with evening hours (last appointment 6:20 pm).  Most are typically open from 8 am to 5 pm.    CNM on call answering service: [phone].  Specify your hospital of choice and leave a brief message for CNM;  will then page CNM who is on call at your specified hospital and you should receive a call back with 15 minutes.  Be sure that your ringer is audible and that you can accept blocked calls so that we can get back in touch with you! This number should be reserved for urgent needs if during the day, before 8 am, after 5 pm, weekends, holidays.    Contact the on-call CNM with warning signs, such as:  vaginal bleeding   Vaginal discharge and itching or pain and burning during urination  Leg/calf pain or swelling on one side  severe abdominal pain  nausea and vomiting more than 4-5 times a day, or if you are unable to keep anything down  fever more than 100.4 degrees F.     [facility]  After each of your visits you are welcome to check [facility] for your visit summary including education and links to information relevant to your pregnancy and/or well woman care.   Find the \"Visits\" tab at the top of the page, you will see a list of recent visits and for each visit a for link for \"View After Visit Summary.\" View of your After Visit Summary will allow you to read our recommendations from your visit, review any education provided, and link to websites with useful information.   If you have any questions or difficulty navigating [facility], please feel free to contact us and we will do our best to direct you.        Touring the Maternity Care Center  At this time we are offering a virtual tour of the Maternity Care Centers at both [facility] and [facility]:   [facility] " "Hospital Maternity Care:   https://[facility].org/locations/the-birthplace-at--[facility] Maternity Care:   https://[facility].org/locations/the-birthplace-at--[facility]         Meet the Midwives from [facility]  You are invited to an informational meet and greet with [facility] Certified Nurse-Midwives. Our free \"Meet the Midwives\" event is a great opportunity to learn about our midwives' philosophy and experience, the hospitals where we can assist with your birth, and answer questions you may have. Partners, friends, and family are welcome to attend. Currently, this is a virtual event.  Date  First Tuesday of every month at 7 pm.    Link to next (live) meeting  https://[email or web address]/classes-and-events/meet-the-midwives-from-[facility]  To Join by Telephone (audio only) Call:   [phone] Phone Conference ID: [phone]#        Ultrasound Appointment:   Don't forget to schedule your ultrasound appointment around 20 weeks into your pregnancy. Your midwife will order the exam for you to schedule at [phone] with [facility] radiology locations or at the independent radiology clinic of your preference.      GENETIC SCREENING OPTIONS AT [facility]        All testing is optional. We don t recommend or discourage any test; it is totally up to you and your partner. Some couples wish to know their risk of having a baby with a genetic defect and others do not. We will support your decision. Abnormal results may lead to a discussion of options for further testing.  Accurate dating of your pregnancy is important for all testing so an ultrasound may be done prior to referral or testing.  No testing provides certainty; there are false positives and negatives associated with all testing, some more than others.  Most genetic testing is non-invasive (requires only a blood sample and sometimes " an ultrasound or both).  It is always wise to check with your insurance carrier before proceeding.  Some testing can be done at our lab and some require a referral.  If you decide to do no testing, the 20 week ultrasound scan has some ability to detect abnormalities in the baby.    [place] or [facility]  At 10 wk or greater, a blood sample can be drawn here at clinic or at any of our referral offices. It will provide highly accurate results with low false positive rates for trisomy 18, trisomy 21 (Down Syndrome), and trisomy 13 (> 99% trisomy detection rate at a false positive rate of <0.1%). Gender identification can also be obtained if desired.    Quad Screen (4-marker screen)  Between 15 and 21 weeks, a sample of blood can be drawn at our lab to assess your risk of having a baby with Down Syndrome, Trisomy 18 and neural tube defects. Such testing is able to detect these conditions in 80-90% of cases and the false-positive rate is approximately 5%.     Why is dental care in pregnancy important?  During pregnancy, you are more likely to have problems with your teeth or gums. If you have an infection in your teeth or gums, the chance of your baby being premature (born early) or having low birth weight may be slightly higher than if your teeth and gums are healthy  Dental care is safe during pregnancy and important for the health of you and your baby.   What should you know before you see the dentist?  Make sure your dentist knows that you are pregnant.  If medications for infection or for pain are needed, your dentist can prescribe ones that are safe for you and your baby.  Tell your dentist about any changes you have noticed since you became pregnant and about any medications r or supplements you are taking.  Routine x-rays should be avoided in pregnancy, but it may be necessary if there is a problem or an emergency.   Your body should be covered with a lead apron to protect you and your baby.  Dental work can be done  safely at any point in pregnancy. If possible, it is best to delay treatments and pro- cedures until after the first trimester.    For more information on dental health in pregnancy: http://onlinelibrary.dubois.com/store/10.1111/jmwh.26161/asset/klvh47766.pdf?v=1&t=gjwv9v20&y=[email or web address]     Quickening:   Your Baby in the Second Trimester of Pregnancy  At the start of the second trimester, you will feel your baby's movements, which get stronger as the baby grows bigger. At the end of the fourth month, your baby weighs about five ounces. Her kidneys begin to produce urine. During visits to your health care provider, you will be able to hear your baby's heartbeat more clearly. Your baby can move and hear your voice.   By the end of the fifth month, you'll be able to feel light movements (called quickening) of your fetus. Your baby is sleeping and waking at regular intervals, and is more active than before. At this point, she is about nine inches long and weighs about one-half to one pound. During the sixth month, your baby's features become clearer. Eyebrows, eyelashes, and hair are developing. She also has finger and toe prints, and may be kicking strongly.  By the end of the second trimester, your baby weighs as much as two pounds and is about 11 inches long.         Gestational diabetes  Gestational diabetes develops during pregnancy (gestation). Like other types of diabetes, gestational diabetes affects how your cells use sugar (glucose). Gestational diabetes causes high blood sugar that can affect your pregnancy and your baby's health.  Any pregnancy complication is concerning, but there's good news. Expectant moms can help control gestational diabetes by eating healthy foods, exercising and, if necessary, taking medication. Controlling blood sugar can prevent a difficult birth and keep you and your baby healthy.  In gestational diabetes, blood sugar usually returns to normal soon  after delivery. But if you've had gestational diabetes, you're at risk for type 2 diabetes. You'll continue working with your health care team to monitor and manage your blood sugar.    Who is at risk?  This is a list of factors that increase the risk of developing gestational diabetes for women during pregnancy:      Overweight prior to pregnancy (20% or more over ideal body weight)      High risk ethnic group: , , ,       Impaired glucose tolerance or traces of glucose in the urine      Family history of diabetes      Previously giving birth to a baby over 9 lbs. or stillborn      Previous pregnancy with gestational diabetes    Prevention:  There are no guarantees when it comes to preventing gestational diabetes -- but the more healthy habits you can adopt before pregnancy, the better. If you've had gestational diabetes, these healthy choices may also reduce your risk of having it in future pregnancies or developing type 2 diabetes down the road.  Eat healthy foods. Choose foods high in fiber and low in fat and calories. Focus on fruits, vegetables and whole grains. Strive for variety to help you achieve your goals without compromising taste or nutrition. Watch portion sizes.   Keep active. Exercising before and during pregnancy can help protect you from developing gestational diabetes. Aim for 30 minutes of moderate activity on most days of the week. Take a brisk daily walk. Ride your bike. Swim laps.  If you can't fit a single 30-minute workout into your day, several shorter sessions can do just as much good. Park in the distant lot when you run errands. Get off the bus one stop before you reach your destination. Every step you take increases your chances of staying healthy.  Lose excess pounds before pregnancy. Doctors don't recommend weight loss during pregnancy. But if you're planning to get pregnant, losing extra weight beforehand may help you have a healthier  pregnancy.  Focus on permanent changes to your eating habits. Motivate yourself by remembering the long-term benefits of losing weight, such as a healthier heart, more energy and improved self-esteem.    Preventing Diabetes after Pregnancy:  It is estimated that 35-60 percent of women that have had gestational diabetes will develop type 2 diabetes in the future. It is also thought that children from these pregnancies have a greater chance of developing obesity and type 2 diabetes.    If you do have prediabetes or have risk factors for having diabetes, research shows that doing just two things can help you prevent or delay type 2 diabetes: Lose 5% to 7% of your body weight, which would be 10 to 14 pounds for a 200-pound person; and get at least 150 minutes each week of physical activity, such as brisk walking.    RESOURCES    Breastfeeding Information:  OUTPATIENT LACTATION RESOURCES    -Schedule a clinic appointment with a [facility] with dedicated clinic hours for breastfeeding assistance by calling [phone]. Breastfeeding clinic visits are at [facility] on , [facility] on  and [facility] on .     New Parent Connection:   [facility], [address] [place] [address]  In-person meetings on  from 6 pm - 7:15 pm for parents of  to 9 months, at the same site.   All are free, drop-in, no registration required.    There are also free virtual meetings ongoing on :  11:30 am - 12:30 pm for parents of newborns to 3 months  4:15 pm to 5:15 pm for parents of 3 to 9-month olds  For joining info parents should call Yen Stone at [phone]    [facility] Baby Café  Due to COVID-19, all Baby Café sessions are canceled until further notice. For lactation support, please contact one of our bilingual staff:  Lola (IBCLC) [phone]  Jaimie (IBCLC/ Bengali) [phone]  Wil (Hmong) [phone]  Dwayne (Central African)  [phone]  Baby Café is a free, drop-in service offering breastfeeding/chestfeeding support. Come share tips and socialize with other pregnant, breastfeeding/chestfeeding families. Babies and siblings are welcome (no  available).  We offer:  Professionally trained lactation staff.  Resource books for lending.  Relaxed and fun atmosphere.  Refreshments.  Locations  Baby Café is offered at several locations.  Please see below for the Baby Café closest to you.  CANCELED UNTIL FURTHER NOTICE  [facility]  [address] [address]  1st Wednesdays of the month   10 a.m. - Noon  CANCELED UNTIL FURTHER NOTICE  [facility]  [address], [place], [address]  4th Wednesdays of the month   10 a.m. - 12:30 p.m.     CANCELED UNTIL FURTHER NOTICE  [facility]  [address], [place], [address]  4th Wednesdays of the month  4 - 6 p.m.  CANCELED UNTIL FURTHER NOTICE  [facility] [address], [place], [address]  2nd Thursdays of the month.  9:30-11:30 a.m.  Enter through the east end of the building, the blue Door C.  Ring the ECFE buzzer to be let in.   More information  Jaimie Ballesteros  [phone]  [email or web address].     -Attend a baby weigh in at [facility].  Lactation consultants are available to answer questions  [facility]: Tuesdays 1:00 - 2:00  [facility]: Mondays 1:00 - 2:00   www.[facility]chr[facility]oner.Chooos    -Attend one of the New Mama groups at [facility] in [facility].  [facility] also offers one-on-one in home and in office lactation consults.   www.[facility]    -Attend a LeLeche League meeting.  Multiple groups in several locations throughout the [facility]. The meetings are no-cost and always informative breastfeeding education session through [employer] La Leche League  Www.lllondas.org/  Medication use while breastfeeding:  http://toxnet.nlm.nih.gov/newtoxnet/lactmed.htm     Childbirth and Parenting Education:     Everyday Miracles:   https://www.everyday-miracles.org/    Free Video Series from [facility]: https://nursing.[facility]/academics/specialty-areas/nurse-midwifery/having-baby-prenatal-videos/having-baby-prenatal-and    MIKE parenting [place]: http://Uan[facility]aiEmanuel Medical CenterTellMi/   ([phone] 081-XJKV  [place]: (education, yoga & wellness) www.Trilogy International PartnersaZipcar  Enlightened Mama: www.enlightenedmama.The Style Club   Childbirth collective: (Parent topic nights)  www.childbirthcollective.org/  Hypnobabies:  www.hypnobabiestwincities.The Style Club/  Hypnobirthing:  Http://hypnobirthing.The Style Club/  The Birth Hour: https://Inkive/online-childbirth-class/    APPS and Podcasts:   Ryne Adams Nurture    Evidence Based Birth  The Birth Hour (for birth stories)   Birthful   Expectful   The Longest Shortest Time  PregnancyPodcast Yelitza Person    Book Recommendations:   Patsy Sherwood's Birthing From [facility]--first few chapters include a new-age tone, you may prefer to skip it and keep going, because there is good stuff later.  This book recommendation covers emotional preparation, but does cover coping with pain, and use of both pharmacological and nonpharmacological methods.    Dr. Evans' The Pregnancy Book and The Birth Book--the pregnancy book goes month-by month    The Birth Partner by Sushma López    Womanly Art of Breastfeeding by La Leche League International   Bestfeeding by Hannah Smiley--great pictures    Mothering Your Nursing Toddler, by Eloise Momin.   Addresses dealing with so many of the challenging behaviors of a nursing toddler.  How Weaning Happens, by La Leche League.  Discusses weaning at all ages, from medically necessary weaning of an infant, all the way up to age 5 (or older), with why/why not, and strategies.  Very empowering book both for deciding to wean and deciding not to.    American College of Nurse-Midwives (ACNM)  "http://www.midwife.org/; look at the informational handouts at http://www.midwife.org/Share-With-Women     www.mymidwife.org    Mother to Baby (Medication and Herbal guidance in pregnancy): http://www.mothertobaby.org  Toll-Free Hotline: 478.994.2096  LactMed (Medication use while breastfeeding): http://toxnet.nlm.nih.gov/newtoxnet/lactmed.htm    Women's Health.gov:  http://www.womenshealth.gov/a-z-topics/index.html    American pregnancy association - http://americanpregnancy.org    Centering Pregnancy (group prenatal care option): http://centeringhealthcare.org    Information about doulas:  Childbirth collective: http://www.childbirthcollective.org/  Doulas of [place] (EFRA):  www.efra.org  [facility]  project: http://EchoPixeltiesdoulaproject.bitFlyer/     Early Childhood and Family Education (ECFE):  ECFE offers parents hands-on learning experiences that will nourish a lifetime of teachable moments.  http://ecfe.info/ecfe-home/    March of Dimes www.eVropa.bitFlyer     FDA - Nutrition  www.mypyramid.gov  Under \"For Consumers,\" click on \"pregnant and breastfeeding women.\"      Centers for Disease Control and Prevention (CDC) - Vaccines : http://www.cdc.gov/vaccines/       When researching information on the web, question the validity of websites.  The domains .gov, .edu and.org tend to be more reliable information.  If there are a lot of advertisements, be cautious of the information provided. Stay away from blogs and chat rooms please!   "

## 2022-12-06 NOTE — PROGRESS NOTES
Mirian is here with Dewayne for her routine prenatal appt at 19 weeks 3 days gestation. Reviewed + tox screen at [facility], pt denies use since +UPT at home. She agrees to repeat tox screen today. Feeling baby move. Exercising regularly (treadmill, rowing and weight lifting) and after exercise she feels some low abd discomfort and cramping. Reviewed precautions, enc hydration and rest. Disc use of support belt and call prn.   AFP drawn today. Had cold symptoms since last visit, reviewed safe meds in pregnancy. We discussed current recommendations for prenatal appointments. Has appt for fetal survey ultrasound coming up.  Reviewed how to reach CNM with non-urgent and urgent concerns between visits. Advised to make appt in 4 weeks.

## 2022-12-09 LAB
# FETUSES US: NORMAL
AFP MOM SERPL: 0.87
AFP SERPL-MCNC: 41 NG/ML
AGE - REPORTED: 29.5 YR
CURRENT SMOKER: NO
FAMILY MEMBER DISEASES HX: NO
GA METHOD: NORMAL
GA: NORMAL WK
IDDM PATIENT QL: NO
INTEGRATED SCN PATIENT-IMP: NORMAL
SPECIMEN DRAWN SERPL: NORMAL

## 2022-12-14 ENCOUNTER — HOSPITAL ENCOUNTER (OUTPATIENT)
Dept: ULTRASOUND IMAGING | Facility: CLINIC | Age: 27
Discharge: HOME | End: 2022-12-14
Attending: ADVANCED PRACTICE MIDWIFE | Admitting: ADVANCED PRACTICE MIDWIFE
Payer: COMMERCIAL

## 2022-12-14 DIAGNOSIS — Z34.80 SUPERVISION OF OTHER NORMAL PREGNANCY, ANTEPARTUM: ICD-10-CM

## 2022-12-14 PROCEDURE — 76805 OB US >/= 14 WKS SNGL FETUS: CPT

## 2022-12-15 ENCOUNTER — DOCUMENTATION ONLY (OUTPATIENT)
Dept: MIDWIFE SERVICES | Facility: CLINIC | Age: 27
End: 2022-12-15

## 2022-12-15 DIAGNOSIS — Z34.82 ENCOUNTER FOR SUPERVISION OF OTHER NORMAL PREGNANCY IN SECOND TRIMESTER: Primary | ICD-10-CM

## 2022-12-26 ENCOUNTER — HOSPITAL ENCOUNTER (OUTPATIENT)
Facility: CLINIC | Age: 27
Discharge: HOME | End: 2022-12-26
Attending: ADVANCED PRACTICE MIDWIFE | Admitting: ADVANCED PRACTICE MIDWIFE
Payer: COMMERCIAL

## 2022-12-26 ENCOUNTER — HOSPITAL ENCOUNTER (OUTPATIENT)
Facility: CLINIC | Age: 27
End: 2022-12-26
Admitting: ADVANCED PRACTICE MIDWIFE
Payer: COMMERCIAL

## 2022-12-26 ENCOUNTER — TELEPHONE (OUTPATIENT)
Dept: OBGYN | Facility: CLINIC | Age: 27
End: 2022-12-26

## 2022-12-26 VITALS
RESPIRATION RATE: 16 BRPM | TEMPERATURE: 98.1 F | BODY MASS INDEX: 32.57 KG/M2 | HEIGHT: 63 IN | DIASTOLIC BLOOD PRESSURE: 59 MMHG | WEIGHT: 183.8 LBS | SYSTOLIC BLOOD PRESSURE: 123 MMHG

## 2022-12-26 PROBLEM — O46.92 VAGINAL BLEEDING IN PREGNANCY, SECOND TRIMESTER: Status: ACTIVE | Noted: 2022-12-26

## 2022-12-26 LAB
ABO/RH(D): NORMAL
ABO/RH(D): NORMAL
ALBUMIN UR-MCNC: NEGATIVE MG/DL
ANTIBODY SCREEN: NEGATIVE
APPEARANCE UR: CLEAR
BILIRUB UR QL STRIP: NEGATIVE
CLUE CELLS: NORMAL
COLOR UR AUTO: COLORLESS
FETAL BLEED SCREEN: NORMAL
GLUCOSE UR STRIP-MCNC: NEGATIVE MG/DL
HGB UR QL STRIP: NEGATIVE
KETONES UR STRIP-MCNC: NEGATIVE MG/DL
LEUKOCYTE ESTERASE UR QL STRIP: NEGATIVE
NITRATE UR QL: NEGATIVE
PH UR STRIP: 6.5 [PH] (ref 5–7)
SP GR UR STRIP: 1.01 (ref 1–1.03)
SPECIMEN EXPIRATION DATE: NORMAL
SPECIMEN EXPIRATION DATE: NORMAL
TRICHOMONAS, WET PREP: NORMAL
UROBILINOGEN UR STRIP-MCNC: <2 MG/DL
WBC'S/HIGH POWER FIELD, WET PREP: NORMAL
YEAST, WET PREP: NORMAL

## 2022-12-26 PROCEDURE — 87210 SMEAR WET MOUNT SALINE/INK: CPT | Performed by: ADVANCED PRACTICE MIDWIFE

## 2022-12-26 PROCEDURE — 86901 BLOOD TYPING SEROLOGIC RH(D): CPT | Performed by: ADVANCED PRACTICE MIDWIFE

## 2022-12-26 PROCEDURE — 36415 COLL VENOUS BLD VENIPUNCTURE: CPT | Performed by: ADVANCED PRACTICE MIDWIFE

## 2022-12-26 PROCEDURE — 96372 THER/PROPH/DIAG INJ SC/IM: CPT | Performed by: ADVANCED PRACTICE MIDWIFE

## 2022-12-26 PROCEDURE — 81003 URINALYSIS AUTO W/O SCOPE: CPT | Performed by: ADVANCED PRACTICE MIDWIFE

## 2022-12-26 PROCEDURE — 99215 OFFICE O/P EST HI 40 MIN: CPT | Mod: 25 | Performed by: ADVANCED PRACTICE MIDWIFE

## 2022-12-26 PROCEDURE — 250N000011 HC RX IP 250 OP 636: Performed by: ADVANCED PRACTICE MIDWIFE

## 2022-12-26 PROCEDURE — 85461 HEMOGLOBIN FETAL: CPT | Performed by: ADVANCED PRACTICE MIDWIFE

## 2022-12-26 PROCEDURE — G0463 HOSPITAL OUTPT CLINIC VISIT: HCPCS

## 2022-12-26 RX ORDER — LIDOCAINE 40 MG/G
CREAM TOPICAL
Status: DISCONTINUED | OUTPATIENT
Start: 2022-12-26 | End: 2022-12-27 | Stop reason: HOSPADM

## 2022-12-26 RX ADMIN — HUMAN RHO(D) IMMUNE GLOBULIN 300 MCG: 1500 SOLUTION INTRAMUSCULAR; INTRAVENOUS at 22:47

## 2022-12-26 ASSESSMENT — ACTIVITIES OF DAILY LIVING (ADL): ADLS_ACUITY_SCORE: 31

## 2022-12-27 NOTE — TELEPHONE ENCOUNTER
PHONE CALL TO ON-CALL CNM:  Mirian just arrived home from work and went to the bathroom.  When she wiped there was a good amount of bright red blood on the toilet paper.  With each subsequent wipe the bleeding lessened.    Not having uterine cramping that she's noticed.  Has has a small amount of back discomfort over the last few days, but it feels to be muscular/skeletal as her belly is growing, not like cramping.  Not leaking fluid.  Worked out this morning before going to work for the day and has had some round ligament discomfort since, but this always happens on days she works out.  No UTI or vaginal symptoms.  Hasn't had intercourse since 12/22.  Didn't have a BM when was going to the bathroom, and also did not wipe in the anal area, so bleeding is not from tat location.  Only wiped in area of vagina/urethra, so bleeding is coming from one of those locations.  Feeling baby move normally.    Mirian has an O neg blood type and was instructed to notify the CNMs if she has any bleeding.    Have recommended Mirian come into [facility] for further assessment.  She agrees.

## 2022-12-27 NOTE — PROGRESS NOTES
Patient triaged for vaginal bleeding. Speculum exam completed by midwife. Labs all normal, no source of bleeding identified. Rhogam given as ordered, patient discharged home, self care.   Nicole Kauffman RN on 12/26/2022 at 10:58 PM

## 2022-12-27 NOTE — PROGRESS NOTES
"Outpatient/Triage and Discharge to Home Note:    Patient Name:  Mirian Cordova  :      1995  MRN:      [id number]      Subjective:  Mirian is a 27 year old  who presented to [facility] for evaluation of bleeding.    Details from phone conversation and in-person conversation with Mirian when she arrives to unit:  Just before calling on-call CNM Mirian had just arrived home from work and went to the bathroom.  When she wiped there was a good amount of bright red blood on the toilet paper.  With each subsequent wipe the bleeding lessened.  She is now wearing a pad and hasn't noticed anything on her pad     Not having uterine cramping that she's noticed.  Has has a small amount of back discomfort over the last few days, but it feels to be muscular/skeletal as her belly is growing, not like cramping.  Not leaking fluid.  Worked out this morning before going to work for the day and has had some round ligament discomfort since, but this always happens on days she works out.  No UTI or vaginal symptoms.  Hasn't had intercourse since .  Didn't have a BM when was going to the bathroom, and also did not wipe in the anal area, so bleeding is not from tat location.  Only wiped in area of vagina/urethra, so bleeding is coming from one of those locations.  Feeling baby move normally.    No history of known injury or impact to abdomen.    Review of recent US on 22 finds that the placenta is located posterior and no evidence of previa.     Mirian has an O neg blood type and was instructed to notify the CNMs if she has any bleeding.         Objective:  /59   Temp 98.1  F (36.7  C) (Oral)   Resp 16   Ht 1.6 m (5' 3\")   Wt 83.4 kg (183 lb 12.8 oz)   LMP 07/15/2022 (Exact Date)   BMI 32.56 kg/m       Mirian is comfortable.    Abdomen: SIUP, abdomen non-tender  FHR: due to gestational age dopper ascultation done =   Uterine contractions: Put external toco on to evaluated " uterine activity.  Occasional irritability noted on monitor, no true contraction pattern and patient not noticing anything.   Sterile speculum exam: Vaginal with normal physiologic discharge.  No evidence of bleeding from cervix, uterus, or vagina  Cervical exam: closed, long, high  Other physical exam: urine sample obtained and appears clear      Results:  Recent Results (from the past 24 hour(s))   UA reflex to Microscopic and Culture    Collection Time: 22  8:58 PM    Specimen: Urine, Midstream   Result Value Ref Range    Color Urine Colorless Colorless, Straw, Light Yellow, Yellow    Appearance Urine Clear Clear    Glucose Urine Negative Negative mg/dL    Bilirubin Urine Negative Negative    Ketones Urine Negative Negative mg/dL    Specific Gravity Urine 1.010 1.001 - 1.030    Blood Urine Negative Negative    pH Urine 6.5 5.0 - 7.0    Protein Albumin Urine Negative Negative mg/dL    Urobilinogen Urine <2.0 <2.0 mg/dL    Nitrite Urine Negative Negative    Leukocyte Esterase Urine Negative Negative   Wet preparation    Collection Time: 22  9:27 PM    Specimen: Vagina; Swab   Result Value Ref Range    Trichomonas Absent Absent    Yeast Absent Absent    Clue Cells Absent Absent    WBCs/high power field None None       Adult Type and Screen    Collection Time: 22 10:01 PM   Result Value Ref Range    ABO/RH(D) O NEG     Antibody Screen Negative Negative    SPECIMEN EXPIRATION DATE [id number]             Assessment:  -  @ 22w2d here for evaluation of bleeding.   - Bright red bleeding noted when wiping after urinating this evening.  Only wiped in vaginal/urethral area, so it is not possible that bleeding could have been rectal/anal (also no BM at that time).  Suspect either vagina or urethral/bladder as location of bleeding.  However clinical exam finds no evidence of a sure location or etiology for bleeding  - UA negative, no blood in urine  - O negative blood type, no Rhophylac given so  far this pregnancy      Plan:   - Discussed the use of Rhophylac in pregnancy with Mirian and Apollo.  Educated about how it is indicated in situations of vaginal bleeding, uterine impact/injury, uterine procedures, and also routinely at 28 weeks.  As we do no know a location for the bleeding I can not know for sure if a dose of Rhophylac will be helpful/useful.  However, if we do not give it and she did indeed have bleeding from the uterus, this could cause serious complications in a future pregnancy.  After discussing risks/benefits of administering a dose, Mirian consents to having this done today.  Therefore Antibody screen drawn and returns negative.  Then dose of Rhophylac given   - Hollie Garcia drawn.  Results will not be back until [id number] this morning.  Day call midwives will look for these results and contact patient  - Plan to discharge to home undelivered.   - Reviewed warning signs, including education about decreased or abnormal fetal movement patterns, leaking of fluid, vaginal bleeding, signs of labor, and other general warning signs.   - Reviewed how to contact on-call CNM. Follow-up in clinic with CNM as scheduled or sooner as needed.   - All questions answered. Agrees with plan.       TT = 40 minutes of time of which >50% on education/counseling/care-coordination            Provider:  RAMIN Low/MARINE

## 2022-12-27 NOTE — DISCHARGE INSTRUCTIONS
Discharge Instruction for Undelivered Patients      You were seen for: Bleeding Assessment  We Consulted: Felicia Phillips CNM  You had (Test or Medicine):urine analysis, wet prep, antibody screen     Diet:   Drink 8 to 12 glasses of liquids (milk, juice, water) every day.  You may eat meals and snacks.     Activity:  Call your doctor or nurse midwife if your baby is moving less than usual.     Call your provider if you notice:  Swelling in your face or increased swelling in your hands or legs.  Headaches that are not relieved by Tylenol (acetaminophen).  Changes in your vision (blurring: seeing spots or stars.)  Nausea (sick to your stomach) and vomiting (throwing up).   Weight gain of 5 pounds or more per week.  Heartburn that doesn't go away.  Signs of bladder infection: pain when you urinate (use the toilet), need to go more often and more urgently.  The bag of sandra (rupture of membranes) breaks, or you notice leaking in your underwear.  Bright red blood in your underwear.  Abdominal (lower belly) or stomach pain.  For first baby: Contractions (tightening) less than 5 minutes apart for one hour or more.  Second (plus) baby: Contractions (tightening) less than 10 minutes apart and getting stronger.  *If less than 34 weeks: Contractions (tightening) more than 6 times in one hour.  Increase or change in vaginal discharge (note the color and amount)    Follow-up:  As scheduled in the clinic

## 2023-01-02 ENCOUNTER — PRENATAL OFFICE VISIT (OUTPATIENT)
Dept: MIDWIFE SERVICES | Facility: CLINIC | Age: 28
End: 2023-01-02
Payer: COMMERCIAL

## 2023-01-02 VITALS
DIASTOLIC BLOOD PRESSURE: 50 MMHG | HEIGHT: 63 IN | SYSTOLIC BLOOD PRESSURE: 92 MMHG | HEART RATE: 88 BPM | WEIGHT: 189 LBS | BODY MASS INDEX: 33.49 KG/M2

## 2023-01-02 DIAGNOSIS — Z67.91 RH NEGATIVE STATE IN ANTEPARTUM PERIOD: ICD-10-CM

## 2023-01-02 DIAGNOSIS — Z34.80 SUPERVISION OF OTHER NORMAL PREGNANCY, ANTEPARTUM: Primary | ICD-10-CM

## 2023-01-02 DIAGNOSIS — O46.92 VAGINAL BLEEDING IN PREGNANCY, SECOND TRIMESTER: ICD-10-CM

## 2023-01-02 DIAGNOSIS — O26.899 RH NEGATIVE STATE IN ANTEPARTUM PERIOD: ICD-10-CM

## 2023-01-02 PROCEDURE — 99207 PR PRENATAL VISIT: CPT | Performed by: MIDWIFE

## 2023-01-02 NOTE — PATIENT INSTRUCTIONS
"[facility]  Contact information:  Appointment line and to get a hold of CNM in clinic Monday-Friday 8 am - 5 pm:  [phone].  There are some clinics with early start times (1st appointment 7:40 am) and others with evening hours (last appointment 6:20 pm).  Most are typically open from 8 am to 5 pm.    CNM on call answering service: [phone].  Specify your hospital of choice and leave a brief message for CNM;  will then page CNM who is on call at your specified hospital and you should receive a call back with 15 minutes.  Be sure that your ringer is audible and that you can accept blocked calls so that we can get back in touch with you! This number should be reserved for urgent needs if during the day, before 8 am, after 5 pm, weekends, holidays.    Contact the on-call CNM with warning signs, such as:  vaginal bleeding   Vaginal discharge and itching or pain and burning during urination  Leg/calf pain or swelling on one side  severe abdominal pain  nausea and vomiting more than 4-5 times a day, or if you are unable to keep anything down  fever more than 100.4 degrees F.     [employer]  After each of your visits you are welcome to check [employer] System for your visit summary including education and links to information relevant to your pregnancy and/or well woman care.   Find the \"Visits\" tab at the top of the page, you will see a list of recent visits and for each visit a for link for \"View After Visit Summary.\" View of your After Visit Summary will allow you to read our recommendations from your visit, review any education provided, and link to websites with useful information.   If you have any questions or difficulty navigating [employer], please feel free to contact us and we will do our best to direct you.     Meet the Midwives from [facility]  You are invited to an informational meet and greet with [facility] Certified Nurse-Midwives. " "Our free \"Meet the Midwives\" event is a great opportunity to learn about our midwives' philosophy and experience, the hospitals where we can assist with your birth, and answer questions you may have. Partners, friends, and family are welcome to attend. Currently, this is a virtual event.  Date  Last Thursday of every month at 7 pm.    Link to next (live) meeting  [email or web address] Media/classes-and-[email or web address]/meet-the-midwives-from-[facility]  To Join by Telephone (audio only) Call:   [phone] Phone Conference ID: [phone]#        [facility] Care [facility] Maternity Care:   [email or web address] Media.Snakk Media/locations/the-[facility] Care:   https://[employer]/locations/[facility]       DAILY FETAL MOVEMENT COUNTING    One of the best ways to keep track of a healthy baby is to notice its movements. Healthy babies are very active. However, some perfectly normal babies may sleep quietly as long as 60 minutes without moving. Babies who are having problems are sluggish and move less. Counting these movements can provide your nurse-midwife with a warning of developing problems.    You should begin this counting at the around your 7th to 8th month of your pregnancy (28-32 weeks) if you are ever concerned that there is less movement than normal for your baby.     INSTRUCTIONS    1.  If able, drink 2 glasses of fluid and lie down on one side.  Put your hand on your abdomen.  2. Count 10 separate times that the baby moves. A movement may be a kick, turn, or flip of the baby.  3.  Record the time you feel the 10th movement. When you count the 10th movement, stop counting.  4.  If one hour passes with less than 10 movements, drink a large glass of fruit juice. Then count for the another hour. If you do not reach 10 movements, call the midwives    REMEMBER    The baby " may move all 10 times in an hour or less.  The baby may take up to five hours to move 10 times.  The important thing is to know what is normal for your baby so you can tell your nurse-midwife when something different is happening.    CALL THE NURSE-MIDWIFE IF:    You do not feel 10 movements in five hours.  You have not felt the baby move all day.  It is taking longer and longer each day to get the 10th movement.      Pregnancy: Your Third Trimester Changes  How You Are Changing  Your body is preparing for the birth of your baby. Some of the most common changes are listed below. If you have any questions or concerns, ask your midwife.  You ll gain more weight from fluids, extra blood, and fat deposits. Your baby will gain an average of an ounce per day (a half a pound a week)!  Your breasts will grow as your body gets ready to feed the baby. They may be more tender. You may also notice a slight yellow or white discharge from the nipples.  Discharge from your vagina may increase. This is normal.  You might see some skin color changes on your forehead, cheeks, or nose. Most of these will go away after you deliver.  How Your Baby Is Growing    Month 7  Your baby is about 14 inches long. If born prematurely (too early), your baby would likely survive with special care.   Month 8  Your baby is building up body fat. Baby kicks strongly, but is getting too big to move around much.   Month 9  Your baby weighs nearly 7 pounds and is about 18-20 inches long. In other words, any day now...       UNDERSTANDING  LABOR    Going into labor before your 37th week of pregnancy is called  labor.  labor can cause your baby to be born too soon. This can lead to a number of health problems that may affect your baby. From 28-35 weeks, Patients are advised to be evaluated at [facility] since they have a  Intensive Care Unit (NICU).  -Before labor, the cervix is thick and closed.  -In   labor, the cervix begins to efface (thin) and dilate (open) over a short period of time    Are You At Risk?  Any pregnant woman can have  labor. It may start for no reason. But these risk factors can increase your chances:  Past  labor or past early birth  Smoking and drug or alcohol use during pregnancy  Multiple fetuses (twins or more)  Problems with the shape of the uterus  Bleeding during the pregnancy    The Dangers of  Birth  A baby born too soon may have health problems. This is because the baby didn t have enough time to mature. The baby then is at risk of:  Not breastfeeding well  Having immature lungs  Bleeding in the brain  Dying    Reaching Term  Your goal is to get as close to term as you can before giving birth. The closer you get to term, the higher your chance of having a healthy baby. Work with your healthcare provider. Together, you can take steps that may keep you from giving birth too early.    Symptoms of  Labor  If you believe you re having  labor, contact the midwives right away. But contractions alone don t mean you re in  labor. What matters more are changes in your cervix (the lower end of the uterus).   Symptoms of  labor include:  five or more contractions per hour  Strong & frequent contractions  Constant menstrual-like cramping  Low-back pain    Mucous or bloody vaginal discharge  Bleeding or spotting in the second or third trimester    Evaluating  Labor  Your midwife will try to find out whether you re in  labor or whether you re just having contractions.She may watch you for a few hours. The following tests may be done:  Pelvic exam to see if your cervix has effaced (thinned) and dilated (opened)  Uterine activity monitoring to detect contractions  Fetal monitoring to check the health of your baby  Ultrasound to check your baby s size and position    Caring for Yourself At Home  If you have contractions  but  your cervix is still thick and closed, the midwife may ask you to do the following at home:  Drink plenty of water.  Do fewer activities.  Rest in bed on your side.  Avoid intercourse and nipple stimulation.    When to Call Your Midwife  Five or more contractions per hour  Bag of water breaks  Bleeding or spotting     If You Need Hospital Care   labor often requires that you have hospital care and complete bed rest. You may have an IV (intravenous) line to get fluids. And you may be given pills or an injection to help prevent contractions. Finally, you may receive medication (corticosteroids) that helps your baby s lungs mature more quickly.    Syphilis Screening:   The [place] Department of Health ([facility]) provided new recommendations for screening during pregnancy. If you are pregnant, [facility] recommends testing three times during your pregnancy: at your first visit, 28 weeks, and after delivery.   Syphilis is:   Caused by a bacteria spread by sexual contact  Rising among [place] women of child-bearing age  Syphilis can:   Be passed on to infants during pregnancy or during delivery and can be life threatening  Be cured. There are ways to protect yourself and your babies.        HEALTHY PREGNANCY CARE: 26-30 WEEKS PREGNANT    You are now in your last trimester of pregnancy. Your baby is growing rapidly, can open and close her eyelids, sometimes get hiccups, and you'll probably feel her moving around more often. Your baby has breathing movements and is gaining about one ounce each day. You may notice heartburn and leg cramps. Your back may ache as your body gets used to your baby's size and length.    Discuss your work situation with your midwife or physician as needed. If you stand for long periods of time, you may need to make changes and take breaks.    Pre-register after 30 weeks online at the hospital where your baby will be born [email or web address]      Be aware of your  baby's activity level. You may be asked to do daily fetal movement counts. Contact your midwife or physician about any decreased movement.    You may have been tested for gestational diabetes today. If you are RH negative, you may have had an additional test and a Rhogam injection.    Consider receiving a Tdap vaccination to protect your baby from Pertussis/whooping cough.    Baby Feeding in the Hospital: Information, Support and Resources    As you prepare for the birth of your child, you will want to consider options for feeding your baby including breast-feeding and/or baby formula. The American Academy of Pediatrics recommends exclusive breast-feeding for the first six months (although any amount of breast-feeding is beneficial).  However, we also understand that breast-feeding is a personal choice and not for everyone. Whether or not you choose to breast-feed, your decision will be respected by our staff.    There are numerous benefits of breast-feeding; here are a few to consider:  Provides antibodies to protect your baby from infections and diseases  The cost: formula can cost over $1,500 per year  Convenience, no warming up or sterilizing bottles and supplies  The physical contact with breastfeeding can make babies feel secure, warm and comforted    What ever my feeding choice, what can I expect after I deliver my baby?  Your baby will usually be placed skin-to-skin immediately following birth. The skin to skin contact between you and your baby will be a special and memorable time. The bonding and attachment comforts your baby and has a positive effect on baby s brain development.   Having your baby  room in  with you also helps you start to learn your baby s body rhythms and sleep cycle.    You will also begin to learn your baby s cues (signals) that he or she is ready to feed.    When do I start to feed my baby?  As soon as possible after your baby s birth, you will be encouraged to begin feeding.  In the  first couple of weeks, your baby will eat often.  Breastfeeding babies usually eat at least 8 times in 24 hours.  Babies fed formula usually eat at least 7 times in 24 hours.      Breast-feeding tips:  Get comfortable and use pillows for support.  Have your baby at the level of your breast, facing you,  tummy to tummy .    Touch your nipple to your baby s lips so you baby s mouth opens wide (rooting reflex).  Aim the nipple toward the roof of your baby s mouth. When your baby opens his or her mouth, pull your baby toward your breast to help your baby  latch on  to your nipple and much of the areola area.  Hand expressing your breast milk can assist with latching your baby to your breast, if needed.  Ask for help, breastfeeding may seem awkward or uncomfortable at first, this is normal. There are numerous resources available at [facility] and beyond.   If your goal is to exclusively breastfeed, avoid using any formula or artificial nipples (including bottles and pacifiers) while you are your baby are learning to breastfeed unless there is a medical reason.     Mixing breastfeeding and formula can interfere with how you begin building your milk supply.  It can impact how you and your baby  learn  to breastfeeding together and alter the natural growth of  good  bacteria in your baby s stomach.  Delay a pacifier or a bottle in the first few weeks until breastfeeding is well established. This is often around 3 weeks of age.  Ask your nurse to show you how to hand express.   Breast milk can be kept in the refrigerator or freezer for later use.    Hospital and Clinic Resources:  -Schedule an appointment with a [facility] CNM who is also a Lactation Consultant by calling [phone]     -Schedule a clinic appointment with a [facility] CNM with dedicated clinic hours for breastfeeding assistance by calling [phone]. Breastfeeding clinic visits are at [facility] on Tuesdays, [facility] on   and [facility] on .     New Parent Connection:   Kayla Caldera, [address] [facility]  In-person meetings on  from 6 pm - 7:15 pm for parents of  to 9 months, at the same site.   All are free, drop-in, no registration required.    There are also free virtual meetings ongoing on :  11:30 am - 12:30 pm for parents of newborns to 3 months  4:15 pm to 5:15 pm for parents of 3 to 9-month olds  For joining info parents should call Yen Stone at [phone]      [facility] Baby Café  Due to COVID-19, all Baby Café sessions are canceled until further notice. For lactation support, please contact one of our bilingual staff:  Lola (IBCLC) [phone]  Jaimie (IBCLC/ Bahraini) [phone]  Zokristal (Hmong) [phone]  Dwayne (Thai) [phone]  Baby Café is a free, drop-in service offering breastfeeding/chestfeeding support. Come share tips and socialize with other pregnant, breastfeeding/chestfeeding families. Babies and siblings are welcome (no  available).  We offer:  Professionally trained lactation staff.  Resource books for lending.  Relaxed and fun atmosphere.  Refreshments.  Locations  Baby Café is offered at several locations.  Please see below for the Baby Café closest to you.  CANCELED UNTIL FURTHER NOTICE  [facility]  [address] [facility], [facility]   of the month   10 a.m. - Noon  CANCELED UNTIL FURTHER NOTICE  [facility]  [address], [place], [address]  4th  of the month   10 a.m. - 12:30 p.m.     CANCELED UNTIL FURTHER NOTICE  [facility]  [address], [place], [address]   of the month  4 - 6 p.m.  CANCELED UNTIL FURTHER NOTICE  [facility]  [address], [place], [facility]  2nd  of the month.  9:30-11:30 a.m.  Enter through the east end of the building, the blue Door C.  Ring the ECFE buzzer to be let in.    More information  Jaimie Ballesteros  [phone]  [email or web address].     -Attend a baby weigh in at [facility].  Lactation consultants are available to answer questions  [place]: Tuesdays 1:00 - 2:00  [facility], [facility]: Mondays 1:00 - 2:00  www.[facility] SystemClipmarks.BucketFeet    -Attend one of the New Mama groups at [facility] in [facility].  [facility] also offers one-on-one in home and in office lactation consults.   www.[facility].BucketFeet    -Attend a Leander Bryant meeting.  Multiple groups in several locations throughout the [facility]. The meetings are no-cost and always informative breastfeeding education session through [employer] La Leche League  Www.bala.org/  Medication use while breastfeeding: http://toxnet.nlm.nih.gov/newtoxnet/lactmed.htm     Online Resources:  [facility]ast.org/baby sign up for free online weekly e-mail  [facility]altheast.org/maternity  Breastfeedingmadesimple.com  Wibki.BioLeap (La Leche League)  Normalfed.com  Womenshealth.gov/breastfeeding  Workandpump.com    Breast-feeding Supplies & Pumps:  Talk to your insurance provider or WIC (Women, Infants and Children) to learn more about options available to you. Recent health insurance changes may include additional coverage for supplies and pumps.    Public Health:  Women, Infants and Children Nutrition program (WIC): provides breast-feeding support and education in addition to formal feeding moms. 190-BZP-9527 or http://www.health.[facility].[place].us/divs/fh/wic    Family Health Home Visiting: Public [facility] Nurse home visits are available. Talk to your provider to see if you qualify. Most [facility] have a program available.    Additional Resources:  La Leche League is an international, nonprofit, nonsectarian organization offering information, education, and support to mothers who want to breast-feed their babies. Local groups offer phone help and monthly meetings. Visit GrandCentral.BioLeap or Netcents Systems.org and us the  Find local  support  drop down menu or click on the  Resources  tab.    [place] Breastfeeding Resources: 3-480-437-BABY (1804) toll free    National Breastfeeding Help Line trained breastfeeding peer counselors can help answer common breast-feeding questions by phone. Monday-Friday: English/Thai  [phone] toll free, [phone] (TTY)    [facility] Care Connection: [phone] ([phone])      Resources:    Childbirth and Parenting Education:       Everyday Miracles:   https://www.everyday-miracles.org/    Free Video Series from [facility]: https://nursing.[facility]/academics/specialty-areas/nurse-midwifery/having-baby-prenatal-videos/having-baby-prenatal-and    MIKE parenting center: http://[facility] CenterBaru Exchange/   (994) 926-BABY  Blooma: (education, yoga & wellness) www.studdex  Enlightened Mama: www.Novalysenedmama.NeoCodex   Childbirth collective: (Parent topic nights)  www.childbirthcollective.org/  Hypnobabies:  www.hypnobabiestwincities.com/  Hypnobirthing:  Http://hypnobirthing.com/  The Birth Hour: https://Zazom/online-childbirth-class/    APPS and Podcasts:   Ryne Adams Nurture    Evidence Based Birth  The Birth Hour (for birth stories)   Birthful   Expectful   The Longest Shortest Time  PregnancyPodcast Yelitza Person    Book Recommendations:   Patsy Maple Hill's Birthing From [facility]--first few chapters include a new-age tone, you may prefer to skip it and keep going, because there is good stuff later.  This book recommendation covers emotional preparation, but does cover coping with pain, and use of both pharmacological and nonpharmacological methods.    Dr. Evans' The Pregnancy Book and The Birth Book--the pregnancy book goes month-by month      The Birth Partner by Sushma López    Womanly Art of Breastfeeding by La Leche League International   Bestfeeding by Hannah Smiley--great pictures    Mothering Your Nursing Toddler, by Eloise Momin.   Addresses dealing with so many of the  "challenging behaviors of a nursing toddler.  How Weaning Happens, by La Leche League.  Discusses weaning at all ages, from medically necessary weaning of an infant, all the way up to age 5 (or older), with why/why not, and strategies.  Very empowering book both for deciding to wean and deciding not to.    American College of Nurse-Midwives (ACNM) http://www.midwife.org/; look at the informational handouts at http://www.midwife.org/Share-With-Women     www.mymidwife.org    Mother to Baby (Medication and Herbal guidance in pregnancy): http://www.mothertobaby.org  Toll-Free Hotline: 955.832.8899  LactMed (Medication use while breastfeeding): http://toxnet.nlm.nih.gov/newtoxnet/lactmed.htm    Women's Health.gov:  http://www.womenshealth.gov/a-z-topics/index.html    American pregnancy association - http://americanpregnancy.org    Centering Pregnancy (group prenatal care option): http://centeringhealthcare.org    Information about doulas:  Childbirth collective: http://www.childbirthcollective.org/  Doulas of [place] (EFRA):  www.efra.org  [facility]  project: http://twincitiesdoulaproject.com/     Early Childhood and Family Education (ECFE):  ECFE offers parents hands-on learning experiences that will nourish a lifetime of teachable moments.  http://ecfe.info/ecfe-home/    March of Dimes www.BuzzElement.com     FDA - Nutrition  www.mypyramid.gov  Under \"For Consumers,\" click on \"pregnant and breastfeeding women.\"      Centers for Disease Control and Prevention (CDC) - Vaccines : http://www.cdc.gov/vaccines/       When researching information on the web, question the validity of websites.  The domains .gov, .edu and.org tend to be more reliable information.  If there are a lot of advertisements, be cautious of the information provided. Stay away from blogs and chat rooms please!             Virtual Breastfeeding Support:    During this time of isolation, breastfeeding families need even more community!  Here " "are some area organizations offering virtual support groups for breastfeeding:    Lat Cafe Support Group,  at 10:30 am   Run by CHRISTIE Chaney of [employer]act[employer]   Go to [employer]act[employer] Facebook page and click on \"events\" for link   https://www.[employer]aget[employer].com/events/108197621627892/  [facility] Milk Hour,  at 2:30 pm    Run by CHRISTIE Treviño   Go to [facility] FB page and send message to get link   https://www.[employer]aget[employer].com/[facility] Hospitalfoundations/  [facility]/[place] holding virtual meetings the first Tuesday of each month, 8-9 pm, and the   Third Saturday, 10 - 11 am.  Go to [facility] and [place] FB page; message to get link https://www.[employer].Horizontal Systems/LLLofGoldenRobert/?hc_location=[facility] offers a Lactation Lounge every Friday 12pm - 1pm, run by Marianne Holder [facility] Leader   Sign up via link at INFOGRAPHIQS/cbe-lactation   https://www.INFOGRAPHIQS/cbe-lactation  [facility] is offering virtual support groups every Monday, 10:30 am - 12 pm, run by nurse IBCLC   Https://www.facebook.com/events/008989733496023/    Prenatal Breastfeeding Classes:      [employer] is offering virtual breastfeeding and  care classes:  https://www.INFOGRAPHIQS/education-workshops  BirthEd childbirth and breastfeeding education offering virtual prenatal breastfeeding classes  https://www.birthedmn.com/workshops   "

## 2023-01-02 NOTE — PROGRESS NOTES
"Mirian presents to clinic at 23w2d for routine prenatal, accompanied by Dewayne.  Discussed her vaginal bleeding episode 12/26/22, has not had any more occurrences and denies signs of PTL including cramping, contractions, and leaking fluid since the event. Has been feeling regular fetal movements \"she's really active\". Reviewed 20 week anatomy scan (WNL), plan for f/u US due to inability to visualize heart/outflow tracts - Order in place, patient to schedule. Mirian has been exercising up to 5x/week at [employer], has been having some low back and low abdominal soreness as well as abdominal coning with this exercise. Discussed normal anatomical changes in pregnancy and protective measures for back/abdomen, order placed for maternal belt. Reviewed upcoming labs and vaccinations at 28 week visit, patient handouts given for , GDM screening, vaccines, Tdap, syphilis, and Rh neg. Considering taking parent/childbirth classes through [facility] but undecided. All patient questions answered.    TWG in pregnancy 3 lb, slightly below recommended range.     RTC 4 weeks or PRN.    Patient was seen with student, GOYO Moreland who was present for learning. I personally assessed, examined and made clinical decisions reflected in the documentation.     "

## 2023-01-04 ENCOUNTER — TELEPHONE (OUTPATIENT)
Dept: MIDWIFE SERVICES | Facility: CLINIC | Age: 28
End: 2023-01-04

## 2023-01-04 NOTE — TELEPHONE ENCOUNTER
Pt thinks she has a UTI. She took some over the counter meds yesterday and that seemed to help. She's wondering if she should keep doing that or if she should be seen and get antibiotics. Please call her back to discuss

## 2023-01-04 NOTE — PROGRESS NOTES
Here with Dewayne today for problem visit at 23w5d. Reports feeling urinary frequency and pain end of voiding since 1/3/2023, AZO improved symptoms temporarily. Did initiate increasing hydration and started vitamin C after recommendations provided on 1/4/2022. Notes active FM and no regular UCs. Symptoms not increasing, noting now just discomfort at very end of void only first void of the day, possibly spasm. UA essentially negative with trace blood present. Plan to continue to monitor symptoms and be seen if worsening. Has ultrasound following this visit for suboptimal views of the heart. Inquired about which risk factors she has for preeclampsia and why she is taking/recommended 81 mg ASA; reviewed and questions answered. Advised on upcoming labs and visit schedule. Reviewed warning s/sx and reasons to call. RTC 4 weeks.

## 2023-01-04 NOTE — TELEPHONE ENCOUNTER
TC: spoke with Mirian, reports yesterday and today urgency, frequency after first void of the day, and small dysuria at end of voiding. Discomfort improved following 1 tab AZO yesterday and did not return until this morning. Reports her water intake is close to 60 fl oz daily. Given recommendation to be seen in clinic for UA/UC due to pregnant state. Discussed strategies for prevention/limiting infection progression until she is able to be seen and given written information. Encouraged to call if symptoms are worsening prior to scheduled appointment for empiric treatment.

## 2023-01-04 NOTE — PATIENT INSTRUCTIONS
"[facility] Nurse Midwives [facility] Contact information:  Appointment line and to get a hold of CNM in clinic Monday-Friday 8 am - 5 pm:  [phone].  There are some clinics with early start times (1st appointment 7:40 am) and others with evening hours (last appointment 6:20 pm).  Most are typically open from 8 am to 5 pm.    CNM on call answering service: [phone].  Specify your hospital of choice and leave a brief message for CNM;  will then page CNM who is on call at your specified hospital and you should receive a call back with 15 minutes.  Be sure that your ringer is audible and that you can accept blocked calls so that we can get back in touch with you! This number should be reserved for urgent needs if during the day, before 8 am, after 5 pm, weekends, holidays.    Contact the on-call CNM with warning signs, such as:  vaginal bleeding   Vaginal discharge and itching or pain and burning during urination  Leg/calf pain or swelling on one side  severe abdominal pain  nausea and vomiting more than 4-5 times a day, or if you are unable to keep anything down  fever more than 100.4 degrees F.   [place]weet[place]hart  After each of your visits you are welcome to check [employer] for your visit summary including education and links to information relevant to your pregnancy and/or well woman care.   Find the \"Visits\" tab at the top of the page, you will see a list of recent visits and for each visit a for link for \"View After Visit Summary.\" View of your After Visit Summary will allow you to read our recommendations from your visit, review any education provided, and link to websites with useful information.   If you have any questions or difficulty navigating Talkpush, please feel free to contact us and we will do our best to direct you.  Meet the Midwives from [facility]  You are invited to an informational meet and greet with [facility]'s [facility] Certified Nurse-Midwives. Our free " "\"Meet the Midwives\" event is a great opportunity to learn about our midwives' philosophy and experience, the hospitals where we can assist with your birth, and answer questions you may have. Partners, friends, and family are welcome to attend. Currently, this is a virtual event.  Date  Last Thursday of every month at 7 pm.    Link to next (live) meeting  [email or web address]/classes-and-events/meet-the-midwives-from-[facility]  To Join by Telephone (audio only) Call:   [phone] Phone Conference ID: [phone]#      UNDERSTANDING  LABOR    Going into labor before your 37th week of pregnancy is called  labor.  labor can cause your baby to be born too soon. This can lead to a number of health problems that may affect your baby. From 28-35 weeks, Patients are advised to be evaluated at [facility] since they have a  Intensive Care Unit (NICU).  -Before labor, the cervix is thick and closed.  -In  labor, the cervix begins to efface (thin) and dilate (open) over a short period of time    Symptoms of  Labor  If you believe you re having  labor, contact the midwives right away. But contractions alone don t mean you re in  labor. What matters more are changes in your cervix (the lower end of the uterus). Symptoms of  labor include:  five or more contractions per hour  Strong & frequent contractions  Constant menstrual-like cramping  Low-back pain  Mucous or bloody vaginal discharge  Bleeding or spotting in the second or third trimester    Evaluating  Labor  Your midwife will try to find out whether you re in  labor or whether you re just having contractions.She may watch you for a few hours. The following tests may be done:  Pelvic exam to see if your cervix has effaced (thinned) and dilated (opened)  Uterine activity monitoring to detect contractions  Fetal monitoring to check the health of your " baby  Ultrasound to check your baby s size and position    Caring for Yourself At Home  If you have contractions  but your cervix is still thick and closed, the midwife may ask you to do the following at home:  Drink plenty of water.  Do fewer activities.  Rest in bed on your side.  Avoid intercourse and nipple stimulation.    When to Call Your Midwife  Five or more contractions per hour  Bag of water breaks  Bleeding or spotting     If You Need Hospital Care   labor often requires that you have hospital care and complete bed rest. You may have an IV (intravenous) line to get fluids. And you may be given pills or an injection to help prevent contractions. Finally, you may receive medication (corticosteroids) that helps your baby s lungs mature more quickly.    Are You At Risk?  Any pregnant woman can have  labor. It may start for no reason. But these risk factors can increase your chances:  Past  labor or past early birth  Smoking and drug or alcohol use during pregnancy  Multiple fetuses (twins or more)  Problems with the shape of the uterus  Bleeding during the pregnancy    The Dangers of  Birth  A baby born too soon may have health problems. This is because the baby didn t have enough time to mature. The baby then is at risk of:  Not breastfeeding well  Having immature lungs  Bleeding in the brain  Dying    Reaching Term  Your goal is to get as close to term as you can before giving birth. The closer you get to term, the higher your chance of having a healthy baby. Work with your healthcare provider. Together, you can take steps that may keep you from giving birth too early.        Testing for Gestational Diabetes in Pregnancy  [facility] are committed to providing safe care during your pregnancy. We follow the recommendations of the American Diabetes Association and the American College of Obstetricians and Gynecologists to test all pregnant women for gestational  diabetes. Testing early in pregnancy (if you have risk factors) and testing all women between 26-28 weeks follows local and national guidelines for care during pregnancy. Clients who feel that they cannot consent to such testing, may choose to transfer their care to our consultant obstetricians.  What is the test?  Eat normally on the day of the test; a diet rich in protein, whole grains, and vegetables would be best. Avoid simple sugars, white flour products and juices prior to testing.  You will be asked to drink a 10 oz glucose beverage (50 gm, about the same as a can of root beer).  The product is not carbonated as it has to be consumed within 5 minutes. During the next hour, you are seen for a prenatal visit, but are asked to limit your activity around the clinic. Feel free to bring a book or your computer.   Any level less than 130 for this  glucose challenge test  is considered normal. If measured at 140 to 185, the diagnostic test, a  3 hour glucose tolerance test  will be conducted. If 2 or more readings are abnormal, the diagnosis of gestational diabetes is confirmed and a referral to a diabetes educator will be made. If the level is 185 or above, this confirms the diagnosis and a referral will be made without administering the 3 hour test.  A fasting blood sugar may be performed sometime in the first trimester if you have risk factors for the development of gestational diabetes such as a previous diagnosis or birth of a large baby or a close family relative with diabetes.  What is gestational diabetes?  Your body converts what you eat into glucose. In response to rising blood sugar levels it secretes insulin in order to be able to utilize that glucose. During pregnancy as the placenta grows and matures, it secretes hormones that are necessary to assure baby s growth and development, however, they also reduce the action of insulin in the mother. In some cases too much insulin is blocked (this is called   insulin resistance ) and blood sugar in the mother rises above a normal level. Pregnant women who have never had diabetes before but who have high blood sugar (glucose) levels during pregnancy are said to have gestational diabetes. Gestational diabetes affects about 4-7% of all pregnancies.  What if I have gestational diabetes?  If either of the tests for gestational diabetes show that you have this condition, a referral will be made to visit with the diabetes educator. The educator will help you to make wise food choices, count carbohydrates, monitor your blood sugar and record your levels in a journal. We ask that you bring this diary with you at each prenatal visit. You may meet with the educator several times during the remainder of your pregnancy.  How gestational diabetes can affect your baby  Some mothers may wonder why testing for GDM is delayed until the early part of the 3rd trimester for most women. Gestational diabetes has an impact on the baby at the time of rapid body growth. When the mother s blood has elevated sugar levels, extra glucose crosses the placenta causing the baby to have excess weight gain ( macrosomia ). Some babies are too big to be born vaginally so their mother must have  births. Babies of mothers with uncontrolled gestational diabetes may have difficult births that can cause trauma to the mother and in rare circumstances, babies may suffer fractures or oxygen deprivation during birth. They may have difficulty maintaining their own blood sugar after birth and they may also have trouble adapting to life outside. Recent research indicates that babies of mothers with uncontrolled or undiagnosed gestational diabetes are at risk for obesity and type 2 diabetes. Women who develop gestational diabetes are more likely to develop type 2 diabetes within 15 years after their pregnancy.  Additional Information  The American College of Nurse Midwives (ACNM) provides an information sheet  describing diabetes screening in pregnancy: http://www.womensdocs.com/ellen/pdf/Second_Trimester/Gestational_Diabetes.pdf    You can visit the American Diabetes Association website http://www.diabetes.org for additional information and to purchase their book,  Gestational Diabetes: What to Expect .    A brochure from the American College of Obstetrics and Gynecology is available at:   http://www.acog.org/~/media/For%20Patients/yow153.pdf?dmc=1&ze=[id number]  Testing for gestational diabetes is a critical part of your prenatal journey. Thank you for taking good care of yourself and your baby.    HEALTHY PREGNANCY CARE: 22-26 WEEKS PREGNANT    You are finishing your second trimester. Your baby is developing rapidly. At this stage, babies have a sense of balance, can respond to touch, and are recognizing parent voices.  Your baby will be moving around more now.  You may notice Camron-Gonzalez contractions now, which are painless and prepare the uterus for the delivery.    Nutrition: During this time, you may find that your nausea and fatigue are gone. Overall, you may feel better and have more energy than you did in your first trimester. Be sure you are getting enough calcium and iron in your diet. Your prenatal vitamins cannot supply all of the nutrients you need, so continue to eat 3-4 servings of dairy foods and 2-3 servings of meat/fish/poultry/nuts every day. Foods high in iron include: red meats, eggs, dark green vegetables, dark yellow vegetables, nuts, kidney beans and chickpeas. Some cereals are fortified with iron, so look at the food labels for 100% of the daily requirement for iron.     Discuss your work situation with your midwife or physician as needed. If you stand for long periods of time, you may need to make changes and take breaks.    [place] for childbirth and parenting classes, including an infant CPR class. Breastfeeding classes are recommended too.    Childbirth and Parenting Education:        Everyday Miracles:   https://www.everyday-miracles.org/    Free Video Series from [facility]: https://nursing.[facility]/academics/specialty-areas/nurse-midwifery/having-baby-prenatal-videos/having-baby-prenatal-and    [facility]: http://[facility] GreenvilleSuniva.[employer]/   (073) 350-HFUI  [place]: (education, yoga & wellness) www.LED EnginaEnsyn  Enlightened Mama: www.enlightenedmama.[employer]   Childbirth collective: (Parent topic nights)  www.childbirthcollective.org/  Hypnobabies:  www.hypnobabiestKompyte..[employer] Games/  Hypnobirthing:  Http://hypnobirthing.[employer]/  The Birth Hour: https://[employer]/online-childbirth-class/    APPS and Podcasts:   Ryne Adams Nurture    Evidence Based Birth  The Birth Hour (for birth stories)   Birthful   Expectful   The Longest Shortest Time  PregnancyPodcast Yelitza Person    Book Recommendations:   Patsy Biju's Birthing From Within--first few chapters include a new-age tone, you may prefer to skip it and keep going, because there is good stuff later.  This book recommendation covers emotional preparation, but does cover coping with pain, and use of both pharmacological and nonpharmacological methods.    Dr. Evans' The Pregnancy Book and The Birth Book--the pregnancy book goes month-by month      The Birth Partner by Sushma López    Womanly Art of Breastfeeding by La Leche League International   Bestfeeding by Hannah Smiley--great pictures    Mothering Your Nursing Toddler, by Eloise Momin.   Addresses dealing with so many of the challenging behaviors of a nursing toddler.  How Weaning Happens, by La Leche League.  Discusses weaning at all ages, from medically necessary weaning of an infant, all the way up to age 5 (or older), with why/why not, and strategies.  Very empowering book both for deciding to wean and deciding not to.    American College of Nurse-Midwives (ACNM) http://www.midwife.org/; look at the informational handouts at  "http://www.midwife.org/Share-With-Women     www.mymidwife.org    Mother to Baby (Medication and Herbal guidance in pregnancy): http://www.mothertobaby.org  Toll-Free Hotline: 349.950.8915  LactMed (Medication use while breastfeeding): http://toxnet.nlm.nih.gov/newtoxnet/lactmed.htm    Women's Health.gov:  http://www.womenshealth.gov/a-z-topics/index.html    American pregnancy association - http://americanpregnancy.org    Centering Pregnancy (group prenatal care option): http://centeringhealthcare.org    Information about doulas:  Childbirth collective: http://www.childbirthcollective.org/  Doulas of [place] (EFRA):  www.efra.org  [facility]  project: http://Thumbs Uptiesdoulaproject.com/     Early Childhood and Family Education (ECFE):  ECFE offers parents hands-on learning experiences that will nourish a lifetime of teachable moments.  http://ecfe.info/ecfe-home/    March of Dimes www.marchofOnetoOnetext.com     FDA - Nutrition  www.mypyramid.gov  Under \"For Consumers,\" click on \"pregnant and breastfeeding women.\"      Centers for Disease Control and Prevention (CDC) - Vaccines : http://www.cdc.gov/vaccines/       When researching information on the web, question the validity of websites.  The domains .gov, .edu and.org tend to be more reliable information.  If there are a lot of advertisements, be cautious of the information provided. Stay away from blogs and chat rooms please!    How can you care for yourself at home?   You can refer to the Starting Out Right book or find it online at http://www.healtheast.org/images/stories/maternity/[facility]-Starting-Out-Right.pdf or http://www.healtheast.org/images/stories/flipbooks/[facility]-starting-out-right/[facility]-starting-out-right.html#p=8     You can sign up for a weekly parenting e-mail that gives support, tips and advice from health care professionals that starts with pregnancy and continues through the toddler years. To register, go to " www.healtheast.org/baby at any time during your pregnancy.      Baby Feeding in the Hospital: Information, Support and Resources    As you prepare for the birth of your child, you will want to consider options for feeding your baby including breast-feeding and/or baby formula. The American Academy of Pediatrics recommends exclusive breast-feeding for the first six months (although any amount of breast-feeding is beneficial).  However, we also understand that breast-feeding is a personal choice and not for everyone. Whether or not you choose to breast-feed, your decision will be respected by our staff.    There are numerous benefits of breast-feeding; here are a few to consider:  Provides antibodies to protect your baby from infections and diseases  The cost: formula can cost over $1,500 per year  Convenience, no warming up or sterilizing bottles and supplies  The physical contact with breastfeeding can make babies feel secure, warm and comforted    What ever my feeding choice, what can I expect after I deliver my baby?  Your baby will usually be placed skin-to-skin immediately following birth. The skin to skin contact between you and your baby will be a special and memorable time. The bonding and attachment comforts your baby and has a positive effect on baby s brain development.   Having your baby  room in  with you also helps you start to learn your baby s body rhythms and sleep cycle.    You will also begin to learn your baby s cues (signals) that he or she is ready to feed.    When do I start to feed my baby?  As soon as possible after your baby s birth, you will be encouraged to begin feeding.  In the first couple of weeks, your baby will eat often.  Breastfeeding babies usually eat at least 8 times in 24 hours.  Babies fed formula usually eat at least 7 times in 24 hours.      Breast-feeding tips:  Get comfortable and use pillows for support.  Have your baby at the level of your breast, facing you,  tummy to  tummy .    Touch your nipple to your baby s lips so you baby s mouth opens wide (rooting reflex).  Aim the nipple toward the roof of your baby s mouth. When your baby opens his or her mouth, pull your baby toward your breast to help your baby  latch on  to your nipple and much of the areola area.  Hand expressing your breast milk can assist with latching your baby to your breast, if needed.  Ask for help, breastfeeding may seem awkward or uncomfortable at first, this is normal. There are numerous resources available at [facility], Clinics and beyond.   If your goal is to exclusively breastfeed, avoid using any formula or artificial nipples (including bottles and pacifiers) while you are your baby are learning to breastfeed unless there is a medical reason.     Mixing breastfeeding and formula can interfere with how you begin building your milk supply.  It can impact how you and your baby  learn  to breastfeeding together and alter the natural growth of  good  bacteria in your baby s stomach.  Delay a pacifier or a bottle in the first few weeks until breastfeeding is well established. This is often around 3 weeks of age.  Ask your nurse to show you how to hand express.   Breast milk can be kept in the refrigerator or freezer for later use.        Touring the Maternity Care Center  [facility] Maternity Care:   https://[facility].org/locations/the-birthplace-atMy[facility] Maternity Care:   https://[facility].org/locations/the-birthplace-at[facility]    Hospital and Clinic  Resources:  -Schedule an appointment with a [facility] Nurse Midwives [facility]   CNYOVANY who is also a Lactation Consultant by calling [phone]     -Schedule a clinic appointment with a [facility] Nurse Midwives [facility] MARINE with dedicated clinic hours for breastfeeding assistance by calling [phone]. Breastfeeding clinic visits  are at [facility] on , [facility] on  and [facility] on .     New Parent Connection:   Kayla Caldera, [address] [facility]3]  In-person meetings on  from 6 pm - 7:15 pm for parents of  to 9 months, at the same site.   All are free, drop-in, no registration required.    There are also free virtual meetings ongoing on :  11:30 am - 12:30 pm for parents of newborns to 3 months  4:15 pm to 5:15 pm for parents of 3 to 9-month olds  For joining info parents should call Yen Chase at [phone]      [facility] Baby Café  Due to COVID-19, all Baby Café sessions are canceled until further notice. For lactation support, please contact one of our bilingual staff:  Lola (IBCLC) [phone]  Jaimie (IBCLC/ Marshallese) [phone]  Wil (Hmong) [phone]  Dwayne (Cymro) [phone]  Baby Café is a free, drop-in service offering breastfeeding/chestfeeding support. Come share tips and socialize with other pregnant, breastfeeding/chestfeeding families. Babies and siblings are welcome (no  available).  We offer:  Professionally trained lactation staff.  Resource books for lending.  Relaxed and fun atmosphere.  Refreshments.  Locations  Baby Café is offered at several locations.  Please see below for the Baby Café closest to you.  CANCELED UNTIL FURTHER NOTICE  [facility]  [address], [address]   of the month   10 a.m. - Noon  CANCELED UNTIL FURTHER NOTICE  [facility]  [address], [place], [address]  4th  of the month   10 a.m. - 12:30 p.m.     CANCELED UNTIL FURTHER NOTICE  [facility]  [address], [place], [address]  4th  of the month  4 - 6 p.m.  CANCELED UNTIL FURTHER NOTICE  [facility]  [address], [place], [facility]   of the month.  9:30-11:30 a.m.  Enter through the east end of the building,  the blue Door C.  Ring the ECFE buzzer to be let in.   More information  Jaimie Ballesteros  [phone]  [email or web address].     -Attend a baby weigh in at [facility].  Lactation consultants are available to answer questions  [place]: Tuesdays 1:00 - 2:00  [facility], [facility]: Mondays 1:00 - 2:00  www.[facility]ator.Vericept    -Attend one of the New Mama groups at [facility] in [facility].  [facility] also offers one-on-one in home and in office lactation consults.   www.[facility].Vericept    -Attend a Leander Bryant meeting.  Multiple groups in several locations throughout the [facility]. The meetings are no-cost and always informative breastfeeding education session through [employer] La Leche League  Www.bala.org/  Medication use while breastfeeding: http://toxnet.nlm.nih.gov/newtoxnet/lactmed.htm     Online Resources:  [facility].org/baby sign up for free online weekly e-mail  [facility]ast.org/maternity  Breastfeedingmadesimple.com  Llli.org (La Leche League)  Normalfed.com  Womenshealth.gov/breastfeeding  Workandpump.com    Breast-feeding Supplies & Pumps:  Talk to your insurance provider or WIC (Women, Infants and Children) to learn more about options available to you. Recent health insurance changes may include additional coverage for supplies and pumps.    Public Health:  Women, Infants and Children Nutrition program (WIC): provides breast-feeding support and education in addition to formal feeding moms. 957-NON-4138 or http://www.health.[facility].mn.us/divs/fh/wic    Family Health Home Visiting: Public Health Nurse home visits are available. Talk to your provider to see if you qualify. Most counties have a program available.    Additional Resources:  La Leche League is an international, nonprofit, nonsectarian organization offering information, education, and support to mothers who want to breast-feed their babies. Local groups offer phone help and monthly meetings. Visit  "[employer]in-MagiceaSouth[place] Solutionse.org or llli.org and us the  Find local support  drop down menu or click on the  Resources  tab.    [place] Breastfeeding Resources: 9-711-329-BABY () toll free    National Breastfeeding Help Line trained breastfeeding peer counselors can help answer common breast-feeding questions by phone. Monday-Friday: English/Upper sorbian  8-676- [phone] toll free, 1-162.601.8418 (TTY)    [facility] Connection: [phone] [facility] (9740)      Virtual Breastfeeding Support:    During this time of isolation, breastfeeding families need even more community!  Here are some area organizations offering virtual support groups for breastfeeding:    [employer] Support Group,  at 10:30 am   Run by CHRISTIE Chaney of [employer] [facility] [facility]   Go to [employer] [facility] [facility] Facebook page and click on \"events\" for link   [email or web address]  [facility] Milk Hour,  at 2:30 pm    Run by CHRISTIE Treviño   Go to [facility] FB page and send message to get link   [email or web address]  [facility]/[employer] holding virtual meetings the first Tuesday of each month, 8-9 pm, and the   Third Saturday, 10 - 11 am.  Go to [facility] and [employer] FB page; message to get link [email or web address]  [facility] offers a Lactation Lounge every Friday 12pm - 1pm, run by [place] Leader   Sign up via link at [employer]/[email or web address]-lactation   [email or web address]  [employer] is offering virtual support groups every Monday, 10:30 am - 12 pm, run by nurse IBCLC   [email or web address]    Prenatal Breastfeeding Classes:      [employer] is offering virtual breastfeeding and  care classes:  " [email or web address]  [employer] childbirth and breastfeeding education offering virtual prenatal breastfeeding classes  [email or web address]

## 2023-01-05 ENCOUNTER — PRENATAL OFFICE VISIT (OUTPATIENT)
Dept: MIDWIFE SERVICES | Facility: CLINIC | Age: 28
End: 2023-01-05
Payer: COMMERCIAL

## 2023-01-05 ENCOUNTER — HOSPITAL ENCOUNTER (OUTPATIENT)
Dept: ULTRASOUND IMAGING | Facility: CLINIC | Age: 28
Discharge: HOME | End: 2023-01-05
Attending: ADVANCED PRACTICE MIDWIFE | Admitting: ADVANCED PRACTICE MIDWIFE
Payer: COMMERCIAL

## 2023-01-05 VITALS
WEIGHT: 189.3 LBS | BODY MASS INDEX: 33.54 KG/M2 | SYSTOLIC BLOOD PRESSURE: 104 MMHG | TEMPERATURE: 97.9 F | HEART RATE: 76 BPM | DIASTOLIC BLOOD PRESSURE: 60 MMHG | HEIGHT: 63 IN

## 2023-01-05 DIAGNOSIS — Z34.80 SUPERVISION OF OTHER NORMAL PREGNANCY, ANTEPARTUM: ICD-10-CM

## 2023-01-05 DIAGNOSIS — Z34.82 ENCOUNTER FOR SUPERVISION OF OTHER NORMAL PREGNANCY IN SECOND TRIMESTER: ICD-10-CM

## 2023-01-05 DIAGNOSIS — R30.0 DYSURIA: Primary | ICD-10-CM

## 2023-01-05 DIAGNOSIS — R35.0 URINARY FREQUENCY: ICD-10-CM

## 2023-01-05 LAB
ALBUMIN UR-MCNC: NEGATIVE MG/DL
APPEARANCE UR: CLEAR
BILIRUB UR QL STRIP: NEGATIVE
COLOR UR AUTO: YELLOW
GLUCOSE UR STRIP-MCNC: NEGATIVE MG/DL
HGB UR QL STRIP: ABNORMAL
KETONES UR STRIP-MCNC: NEGATIVE MG/DL
LEUKOCYTE ESTERASE UR QL STRIP: NEGATIVE
NITRATE UR QL: NEGATIVE
PH UR STRIP: 7 [PH] (ref 5–8)
SP GR UR STRIP: 1.01 (ref 1–1.03)
UROBILINOGEN UR STRIP-ACNC: 0.2 E.U./DL

## 2023-01-05 PROCEDURE — 81003 URINALYSIS AUTO W/O SCOPE: CPT | Performed by: ADVANCED PRACTICE MIDWIFE

## 2023-01-05 PROCEDURE — 76816 OB US FOLLOW-UP PER FETUS: CPT

## 2023-01-05 PROCEDURE — 99207 PR PRENATAL VISIT: CPT | Performed by: ADVANCED PRACTICE MIDWIFE

## 2023-01-06 ENCOUNTER — DOCUMENTATION ONLY (OUTPATIENT)
Dept: MIDWIFE SERVICES | Facility: CLINIC | Age: 28
End: 2023-01-06

## 2023-01-08 ENCOUNTER — OFFICE VISIT (OUTPATIENT)
Dept: FAMILY MEDICINE | Facility: CLINIC | Age: 28
End: 2023-01-08
Payer: COMMERCIAL

## 2023-01-08 ENCOUNTER — TELEPHONE (OUTPATIENT)
Dept: OBGYN | Facility: CLINIC | Age: 28
End: 2023-01-08

## 2023-01-08 VITALS
DIASTOLIC BLOOD PRESSURE: 78 MMHG | OXYGEN SATURATION: 98 % | HEART RATE: 82 BPM | SYSTOLIC BLOOD PRESSURE: 116 MMHG | TEMPERATURE: 98.3 F | BODY MASS INDEX: 34.26 KG/M2 | RESPIRATION RATE: 16 BRPM | WEIGHT: 193.4 LBS

## 2023-01-08 DIAGNOSIS — Z33.1 PREGNANT STATE, INCIDENTAL: ICD-10-CM

## 2023-01-08 DIAGNOSIS — R30.0 DYSURIA: ICD-10-CM

## 2023-01-08 DIAGNOSIS — N30.00 ACUTE CYSTITIS WITHOUT HEMATURIA: Primary | ICD-10-CM

## 2023-01-08 LAB
ALBUMIN UR-MCNC: NEGATIVE MG/DL
APPEARANCE UR: CLEAR
BACTERIA #/AREA URNS HPF: ABNORMAL /HPF
BILIRUB UR QL STRIP: NEGATIVE
COLOR UR AUTO: YELLOW
GLUCOSE UR STRIP-MCNC: 100 MG/DL
HGB UR QL STRIP: ABNORMAL
KETONES UR STRIP-MCNC: NEGATIVE MG/DL
LEUKOCYTE ESTERASE UR QL STRIP: ABNORMAL
NITRATE UR QL: POSITIVE
PH UR STRIP: 7 [PH] (ref 5–8)
RBC #/AREA URNS AUTO: ABNORMAL /HPF
SP GR UR STRIP: 1.01 (ref 1–1.03)
SQUAMOUS #/AREA URNS AUTO: ABNORMAL /LPF
UROBILINOGEN UR STRIP-ACNC: 1 E.U./DL
WBC #/AREA URNS AUTO: ABNORMAL /HPF

## 2023-01-08 PROCEDURE — 99213 OFFICE O/P EST LOW 20 MIN: CPT | Performed by: PHYSICIAN ASSISTANT

## 2023-01-08 PROCEDURE — 81001 URINALYSIS AUTO W/SCOPE: CPT | Performed by: PHYSICIAN ASSISTANT

## 2023-01-08 PROCEDURE — 87086 URINE CULTURE/COLONY COUNT: CPT | Performed by: PHYSICIAN ASSISTANT

## 2023-01-08 RX ORDER — CEPHALEXIN 500 MG/1
500 CAPSULE ORAL 2 TIMES DAILY
Qty: 14 CAPSULE | Refills: 0 | Status: SHIPPED | OUTPATIENT
Start: 2023-01-08 | End: 2023-01-15

## 2023-01-08 ASSESSMENT — ENCOUNTER SYMPTOMS
FREQUENCY: 1
NAUSEA: 0
FLANK PAIN: 0
ABDOMINAL PAIN: 0
VOMITING: 0
CHILLS: 0
FEVER: 0
HEMATURIA: 0
DYSURIA: 1

## 2023-01-08 NOTE — PROGRESS NOTES
Patient presents with:  Dysuria: For 1 week, last 2 days it getting worse, 24 wks pregnant, takes AZO      Clinical Decision Making:  UA is consistent with UTI.  Patient started on Keflex twice daily for the next 10 days.  This is not a recurrence.  There is small amount of glucose in patient's urine.  Patient has glucose tolerance test in 1 to 2 weeks.      ICD-10-CM    1. Acute cystitis without hematuria  N30.00 cephALEXin (KEFLEX) 500 MG capsule      2. Dysuria  R30.0 UA macro with reflex to Microscopic and Culture - Clinc Collect     Urine Microscopic Exam     Urine Culture      3. Pregnant state, incidental  Z33.1           Patient Instructions   1. Increase fluid intake  2. Complete antibiotic regimen as prescribed. You will be notified if the treatment plan needs to be changed based on the urine culture results.   3. Follow if you have a fever of 100.4 F (38 C) or higher, no improvement after three days of treatment, trouble urinating because of pain,new or increased discharge from the vagina, rash or joint pain, Increased back or abdominal pain.        HPI:  Mirian Cordova is a 27 year old female who presents today complaining of painful urination x 1 week, worse over the past 2 days. Patient has taken some Azo.      History obtained from the patient.    Problem List:  2022-12: Rh negative state in antepartum period  2022-12: Vaginal bleeding in pregnancy, second trimester  2022-10: Cannabis abuse  2022-09: Supervision of other normal pregnancy, antepartum      Past Medical History:   Diagnosis Date     Urinary tract infection     Reports many prior to pregnancy       Social History     Tobacco Use     Smoking status: Never     Smokeless tobacco: Never   Substance Use Topics     Alcohol use: Not on file       Review of Systems   Constitutional: Negative for chills and fever.   Gastrointestinal: Negative for abdominal pain, nausea and vomiting.   Genitourinary: Positive for dysuria and frequency.  Negative for flank pain, hematuria, vaginal discharge and vaginal pain.       Vitals:    01/08/23 0928   BP: 116/78   Pulse: 82   Resp: 16   Temp: 98.3  F (36.8  C)   TempSrc: Oral   SpO2: 98%   Weight: 87.7 kg (193 lb 6.4 oz)       Physical Exam  Vitals and nursing note reviewed.   Constitutional:       General: She is not in acute distress.     Appearance: She is not toxic-appearing or diaphoretic.   HENT:      Head: Normocephalic and atraumatic.      Right Ear: External ear normal.      Left Ear: External ear normal.   Eyes:      Conjunctiva/sclera: Conjunctivae normal.   Pulmonary:      Effort: Pulmonary effort is normal. No respiratory distress.   Neurological:      Mental Status: She is alert.   Psychiatric:         Mood and Affect: Mood normal.         Behavior: Behavior normal.         Thought Content: Thought content normal.         Judgment: Judgment normal.         Results:  Results for orders placed or performed in visit on 01/08/23   UA macro with reflex to Microscopic and Culture - Clinc Collect     Status: Abnormal    Specimen: Urine, Clean Catch   Result Value Ref Range    Color Urine Yellow Colorless, Straw, Light Yellow, Yellow    Appearance Urine Clear Clear    Glucose Urine 100 (A) Negative mg/dL    Bilirubin Urine Negative Negative    Ketones Urine Negative Negative mg/dL    Specific Gravity Urine 1.015 1.005 - 1.030    Blood Urine Moderate (A) Negative    pH Urine 7.0 5.0 - 8.0    Protein Albumin Urine Negative Negative mg/dL    Urobilinogen Urine 1.0 0.2, 1.0 E.U./dL    Nitrite Urine Positive (A) Negative    Leukocyte Esterase Urine Large (A) Negative   Urine Microscopic Exam     Status: Abnormal   Result Value Ref Range    Bacteria Urine Moderate (A) None Seen /HPF    RBC Urine 5-10 (A) 0-2 /HPF /HPF    WBC Urine 25-50 (A) 0-5 /HPF /HPF    Squamous Epithelials Urine Few (A) None Seen /LPF         At the end of the encounter, I discussed results, diagnosis, medications. Discussed red flags for  immediate return to clinic/ER, as well as indications for follow up if no improvement. Patient understood and agreed to plan. Patient was stable for discharge.

## 2023-01-08 NOTE — TELEPHONE ENCOUNTER
PHONE CALL TO ON-CALL CNM:  Mirian called CNM at ~0700 with symptoms consistent with a UTI.    Recommended she be seen in Urgent Care/Walk-in Care so that a urine sample can be obtained and sent for a UC.  Educated that UC will return in approximately 2 days, but that she can be treated in the meantime.      She agrees with plan of care and will seek an evaluation at Walk-in Care this morning.

## 2023-01-09 LAB — BACTERIA UR CULT: NO GROWTH

## 2023-01-24 ENCOUNTER — TELEPHONE (OUTPATIENT)
Dept: MIDWIFE SERVICES | Facility: CLINIC | Age: 28
End: 2023-01-24
Payer: COMMERCIAL

## 2023-01-24 NOTE — TELEPHONE ENCOUNTER
Pt was recently seen in Urgent Care and was told that she had glucose in her urine. She is wondering if she even needs to do the 1 hr gtt or if she just do the 3 hr gtt? Please call to discuss.

## 2023-01-24 NOTE — TELEPHONE ENCOUNTER
Called pt back and questions answered. Advised to proceed with one hour glucose at next visit as previously planned and if abnl then 3hr GTT would be recommended.

## 2023-02-01 LAB
ANTIBODY SCREEN: POSITIVE
SPECIMEN EXPIRATION DATE: ABNORMAL

## 2023-02-02 ENCOUNTER — PRENATAL OFFICE VISIT (OUTPATIENT)
Dept: MIDWIFE SERVICES | Facility: CLINIC | Age: 28
End: 2023-02-02
Payer: COMMERCIAL

## 2023-02-02 VITALS
HEART RATE: 72 BPM | HEIGHT: 63 IN | SYSTOLIC BLOOD PRESSURE: 94 MMHG | BODY MASS INDEX: 34.55 KG/M2 | DIASTOLIC BLOOD PRESSURE: 58 MMHG | WEIGHT: 195 LBS

## 2023-02-02 DIAGNOSIS — Z34.80 SUPERVISION OF OTHER NORMAL PREGNANCY, ANTEPARTUM: Primary | ICD-10-CM

## 2023-02-02 DIAGNOSIS — O99.810 IMPAIRED GLUCOSE IN PREGNANCY, ANTEPARTUM: ICD-10-CM

## 2023-02-02 LAB
GLUCOSE 1H P 50 G GLC PO SERPL-MCNC: 137 MG/DL (ref 70–129)
HGB BLD-MCNC: 11.8 G/DL (ref 11.7–15.7)

## 2023-02-02 PROCEDURE — 86870 RBC ANTIBODY IDENTIFICATION: CPT | Performed by: ADVANCED PRACTICE MIDWIFE

## 2023-02-02 PROCEDURE — 36415 COLL VENOUS BLD VENIPUNCTURE: CPT | Performed by: ADVANCED PRACTICE MIDWIFE

## 2023-02-02 PROCEDURE — 85018 HEMOGLOBIN: CPT | Performed by: ADVANCED PRACTICE MIDWIFE

## 2023-02-02 PROCEDURE — 86850 RBC ANTIBODY SCREEN: CPT | Performed by: ADVANCED PRACTICE MIDWIFE

## 2023-02-02 PROCEDURE — 99207 PR PRENATAL VISIT: CPT | Performed by: ADVANCED PRACTICE MIDWIFE

## 2023-02-02 PROCEDURE — 90471 IMMUNIZATION ADMIN: CPT | Performed by: ADVANCED PRACTICE MIDWIFE

## 2023-02-02 PROCEDURE — 82950 GLUCOSE TEST: CPT | Performed by: ADVANCED PRACTICE MIDWIFE

## 2023-02-02 PROCEDURE — 86780 TREPONEMA PALLIDUM: CPT | Performed by: ADVANCED PRACTICE MIDWIFE

## 2023-02-02 PROCEDURE — 90715 TDAP VACCINE 7 YRS/> IM: CPT | Performed by: ADVANCED PRACTICE MIDWIFE

## 2023-02-02 NOTE — PATIENT INSTRUCTIONS
"\"We hope you had a positive experience and that you can definitely recommend [facility] to your family and friends. You ll be receiving a survey soon and we look forward to hearing your feedback\".    [facility]  Contact information:  Appointment line and to get a hold of CNM in clinic Monday-Friday 8 am - 5 pm:  [phone].  There are some clinics with early start times (1st appointment 7:40 am) and others with evening hours (last appointment 6:20 pm).  Most are typically open from 8 am to 5 pm.    CNM on call answering service: [phone].  Specify your hospital of choice and leave a brief message for CNM;  will then page CNM who is on call at your specified hospital and you should receive a call back with 15 minutes.  Be sure that your ringer is audible and that you can accept blocked calls so that we can get back in touch with you! This number should be reserved for urgent needs if during the day, before 8 am, after 5 pm, weekends, holidays.    Contact the on-call CNM with warning signs, such as:  vaginal bleeding   Vaginal discharge and itching or pain and burning during urination  Leg/calf pain or swelling on one side  severe abdominal pain  nausea and vomiting more than 4-5 times a day, or if you are unable to keep anything down  fever more than 100.4 degrees F.     Apokalyyishart  After each of your visits you are welcome to check MOLOME for your visit summary including education and links to information relevant to your pregnancy and/or well woman care.   Find the \"Visits\" tab at the top of the page, you will see a list of recent visits and for each visit a for link for \"View After Visit Summary.\" View of your After Visit Summary will allow you to read our recommendations from your visit, review any education provided, and link to websites with useful information.   If you have any questions or difficulty navigating [facility], please feel free to " "contact us and we will do our best to direct you.     Meet the Midwives from [facility]  You are invited to an informational meet and greet with [facility] Certified Nurse-Midwives. Our free \"Meet the Midwives\" event is a great opportunity to learn about our midwives' philosophy and experience, the hospitals where we can assist with your birth, and answer questions you may have. Partners, friends, and family are welcome to attend. Currently, this is a virtual event.  Date  Last Thursday of every month at 7 pm.    Link to next (live) meeting  https://www.[place].org/classes-and-events/meet-the-midwives-from-[facility]  To Join by Telephone (audio only) Call:   [phone] Phone Conference ID: [phone]#        [facility] Maternity Care [facility] Maternity Care:   [email or web address].[email or web address]/locations/the-[facility]place[facility] M[facility] Care:   [email or web address]://[facility].org/locations/the-[facility]place-[facility]       DAILY FETAL MOVEMENT COUNTING    One of the best ways to keep track of a healthy baby is to notice its movements. Healthy babies are very active. However, some perfectly normal babies may sleep quietly as long as 60 minutes without moving. Babies who are having problems are sluggish and move less. Counting these movements can provide your nurse-midwife with a warning of developing problems.    You should begin this counting at the around your 7th to 8th month of your pregnancy (28-32 weeks) if you are ever concerned that there is less movement than normal for your baby.     INSTRUCTIONS    1.  If able, drink 2 glasses of fluid and lie down on one side.  Put your hand on your abdomen.  2. Count 10 separate times that the baby moves. A movement may be a kick, turn, or flip of the baby.  3.  Record the time you feel the 10th movement. When you count the 10th " movement, stop counting.  4.  If one hour passes with less than 10 movements, drink a large glass of fruit juice. Then count for the another hour. If you do not reach 10 movements, call the midwives    REMEMBER    The baby may move all 10 times in an hour or less.  The baby may take up to five hours to move 10 times.  The important thing is to know what is normal for your baby so you can tell your nurse-midwife when something different is happening.    CALL THE NURSE-MIDWIFE IF:    You do not feel 10 movements in five hours.  You have not felt the baby move all day.  It is taking longer and longer each day to get the 10th movement.      Pregnancy: Your Third Trimester Changes  How You Are Changing  Your body is preparing for the birth of your baby. Some of the most common changes are listed below. If you have any questions or concerns, ask your midwife.  You ll gain more weight from fluids, extra blood, and fat deposits. Your baby will gain an average of an ounce per day (a half a pound a week)!  Your breasts will grow as your body gets ready to feed the baby. They may be more tender. You may also notice a slight yellow or white discharge from the nipples.  Discharge from your vagina may increase. This is normal.  You might see some skin color changes on your forehead, cheeks, or nose. Most of these will go away after you deliver.  How Your Baby Is Growing    Month 7  Your baby is about 14 inches long. If born prematurely (too early), your baby would likely survive with special care.   Month 8  Your baby is building up body fat. Baby kicks strongly, but is getting too big to move around much.   Month 9  Your baby weighs nearly 7 pounds and is about 18-20 inches long. In other words, any day now...       UNDERSTANDING  LABOR    Going into labor before your 37th week of pregnancy is called  labor.  labor can cause your baby to be born too soon. This can lead to a number of health problems that may  affect your baby. From 28-35 weeks, Patients are advised to be evaluated at [facility] since they have a  Intensive Care Unit (NICU).  -Before labor, the cervix is thick and closed.  -In  labor, the cervix begins to efface (thin) and dilate (open) over a short period of time    Are You At Risk?  Any pregnant woman can have  labor. It may start for no reason. But these risk factors can increase your chances:  Past  labor or past early birth  Smoking and drug or alcohol use during pregnancy  Multiple fetuses (twins or more)  Problems with the shape of the uterus  Bleeding during the pregnancy    The Dangers of  Birth  A baby born too soon may have health problems. This is because the baby didn t have enough time to mature. The baby then is at risk of:  Not breastfeeding well  Having immature lungs  Bleeding in the brain  Dying    Reaching Term  Your goal is to get as close to term as you can before giving birth. The closer you get to term, the higher your chance of having a healthy baby. Work with your healthcare provider. Together, you can take steps that may keep you from giving birth too early.    Symptoms of  Labor  If you believe you re having  labor, contact the midwives right away. But contractions alone don t mean you re in  labor. What matters more are changes in your cervix (the lower end of the uterus).   Symptoms of  labor include:  five or more contractions per hour  Strong & frequent contractions  Constant menstrual-like cramping  Low-back pain    Mucous or bloody vaginal discharge  Bleeding or spotting in the second or third trimester    Evaluating  Labor  Your midwife will try to find out whether you re in  labor or whether you re just having contractions.She may watch you for a few hours. The following tests may be done:  Pelvic exam to see if your cervix has effaced (thinned) and dilated  (opened)  Uterine activity monitoring to detect contractions  Fetal monitoring to check the health of your baby  Ultrasound to check your baby s size and position    Caring for Yourself At Home  If you have contractions  but your cervix is still thick and closed, the midwife may ask you to do the following at home:  Drink plenty of water.  Do fewer activities.  Rest in bed on your side.  Avoid intercourse and nipple stimulation.    When to Call Your Midwife  Five or more contractions per hour  Bag of water breaks  Bleeding or spotting     If You Need Hospital Care   labor often requires that you have hospital care and complete bed rest. You may have an IV (intravenous) line to get fluids. And you may be given pills or an injection to help prevent contractions. Finally, you may receive medication (corticosteroids) that helps your baby s lungs mature more quickly.    Syphilis Screening:   The [place] Department of Health ([facility]) provided new recommendations for screening during pregnancy. If you are pregnant, [facility] recommends testing three times during your pregnancy: at your first visit, 28 weeks, and after delivery.   Syphilis is:   Caused by a bacteria spread by sexual contact  Rising among [place] women of child-bearing age  Syphilis can:   Be passed on to infants during pregnancy or during delivery and can be life threatening  Be cured. There are ways to protect yourself and your babies.        HEALTHY PREGNANCY CARE: 26-30 WEEKS PREGNANT    You are now in your last trimester of pregnancy. Your baby is growing rapidly, can open and close her eyelids, sometimes get hiccups, and you'll probably feel her moving around more often. Your baby has breathing movements and is gaining about one ounce each day. You may notice heartburn and leg cramps. Your back may ache as your body gets used to your baby's size and length.    Discuss your work situation with your midwife or physician as needed. If you stand  for long periods of time, you may need to make changes and take breaks.    Pre-register after 30 weeks online at the hospital where your baby will be born [email or web address]      Be aware of your baby's activity level. You may be asked to do daily fetal movement counts. Contact your midwife or physician about any decreased movement.    You may have been tested for gestational diabetes today. If you are RH negative, you may have had an additional test and a Rhogam injection.    Consider receiving a Tdap vaccination to protect your baby from Pertussis/whooping cough.    Baby Feeding in the Hospital: Information, Support and Resources    As you prepare for the birth of your child, you will want to consider options for feeding your baby including breast-feeding and/or baby formula. The American Academy of Pediatrics recommends exclusive breast-feeding for the first six months (although any amount of breast-feeding is beneficial).  However, we also understand that breast-feeding is a personal choice and not for everyone. Whether or not you choose to breast-feed, your decision will be respected by our staff.    There are numerous benefits of breast-feeding; here are a few to consider:  Provides antibodies to protect your baby from infections and diseases  The cost: formula can cost over $1,500 per year  Convenience, no warming up or sterilizing bottles and supplies  The physical contact with breastfeeding can make babies feel secure, warm and comforted    What ever my feeding choice, what can I expect after I deliver my baby?  Your baby will usually be placed skin-to-skin immediately following birth. The skin to skin contact between you and your baby will be a special and memorable time. The bonding and attachment comforts your baby and has a positive effect on baby s brain development.   Having your baby  room in  with you also helps you start to learn your baby s body rhythms and sleep  cycle.    You will also begin to learn your baby s cues (signals) that he or she is ready to feed.    When do I start to feed my baby?  As soon as possible after your baby s birth, you will be encouraged to begin feeding.  In the first couple of weeks, your baby will eat often.  Breastfeeding babies usually eat at least 8 times in 24 hours.  Babies fed formula usually eat at least 7 times in 24 hours.      Breast-feeding tips:  Get comfortable and use pillows for support.  Have your baby at the level of your breast, facing you,  tummy to tummy .    Touch your nipple to your baby s lips so you baby s mouth opens wide (rooting reflex).  Aim the nipple toward the roof of your baby s mouth. When your baby opens his or her mouth, pull your baby toward your breast to help your baby  latch on  to your nipple and much of the areola area.  Hand expressing your breast milk can assist with latching your baby to your breast, if needed.  Ask for help, breastfeeding may seem awkward or uncomfortable at first, this is normal. There are numerous resources available at [facility], Clinics and beyond.   If your goal is to exclusively breastfeed, avoid using any formula or artificial nipples (including bottles and pacifiers) while you are your baby are learning to breastfeed unless there is a medical reason.     Mixing breastfeeding and formula can interfere with how you begin building your milk supply.  It can impact how you and your baby  learn  to breastfeeding together and alter the natural growth of  good  bacteria in your baby s stomach.  Delay a pacifier or a bottle in the first few weeks until breastfeeding is well established. This is often around 3 weeks of age.  Ask your nurse to show you how to hand express.   Breast milk can be kept in the refrigerator or freezer for later use.    Hospital and Clinic Resources:  -Schedule an appointment with a [facility] CNM who is also a Lactation Consultant by calling  [phone]     -Schedule a clinic appointment with a [facility] CNM with dedicated clinic hours for breastfeeding assistance by calling [phone]. Breastfeeding clinic visits are at [facility] on , [facility] on  and [facility] on .     New Parent Connection:   Kayla Caldera, [address] [address] [facility] [place]  In-person meetings on  from 6 pm - 7:15 pm for parents of  to 9 months, at the same site.   All are free, drop-in, no registration required.    There are also free virtual meetings ongoing on :  11:30 am - 12:30 pm for parents of newborns to 3 months  4:15 pm to 5:15 pm for parents of 3 to 9-month olds  For joining info parents should call Yen Stone at [phone]      [facility] Baby Café  Due to COVID-19, all Baby Café sessions are canceled until further notice. For lactation support, please contact one of our bilingual staff:  Lola (IBCLC) [phone]  Jaimie (IBCLC/ Frisian) [phone]  Wil (Hmong) [phone]  Dwayne (Mexican) [phone]  Baby Café is a free, drop-in service offering breastfeeding/chestfeeding support. Come share tips and socialize with other pregnant, breastfeeding/chestfeeding families. Babies and siblings are welcome (no  available).  We offer:  Professionally trained lactation staff.  Resource books for lending.  Relaxed and fun atmosphere.  Refreshments.  Locations  Baby Café is offered at several locations.  Please see below for the Baby Café closest to you.  CANCELED UNTIL FURTHER NOTICE  [facility]  [address], [address]  1st Wednesdays of the month   10 a.m. - Noon  CANCELED UNTIL FURTHER NOTICE  [facility]  [address], [place], [address]  4th Wednesdays of the month   10 a.m. - 12:30 p.m.     CANCELED UNTIL FURTHER NOTICE  [facility]  [address], [place], [address]  4th Wednesdays of the month  4 - 6 p.m.  CANCELED UNTIL  FURTHER NOTICE  [facility]  [address], [place], [facility]  2nd Thursdays of the month.  9:30-11:30 a.m.  Enter through the east end of the building, the blue Door C.  Ring the ECFE buzzer to be let in.   More information  Jaimie Ballesteros  [phone]  [email or web address].     -Attend a baby weigh in at [facility].  Lactation consultants are available to answer questions  [place]: Tuesdays 1:00 - 2:00  [facility]: Mondays 1:00 - 2:00  www.[facility].[employer]    -Attend one of the New Mama groups at [facility] in [facility].  [facility] also offers one-on-one in home and in office lactation consults.   www.[facility].[employer]    -Attend a Leander Bryant meeting.  Multiple groups in several locations throughout the [facility]. The meetings are no-cost and always informative breastfeeding education session through Internatal La Leche League  Www.bala.org/  Medication use while breastfeeding: http://toxnet.nlm.nih.gov/newtoxnet/lactmed.htm     Online Resources:  [facility].org/baby sign up for free online weekly e-mail  [facility].org/maternity  Breastfeedingmadesimple.com  Llli.org (La Leche League)  Normalfed.com  Womenshealth.gov/breastfeeding  Workandpump.com    Breast-feeding Supplies & Pumps:  Talk to your insurance provider or WIC (Women, Infants and Children) to learn more about options available to you. Recent health insurance changes may include additional coverage for supplies and pumps.    Public Health:  Women, Infants and Children Nutrition program (WIC): provides breast-feeding support and education in addition to formal feeding moms. 705-ZPT-7507 or http://www.health.[facility].mn.us/divs/fh/wic    Family Health Home Visiting: Public Health Nurse home visits are available. Talk to your provider to see if you qualify. Most counties have a program available.    Additional Resources:  La Leche League is an international, nonprofit,  nonsectarian organization offering information, education, and support to mothers who want to breast-feed their babies. Local groups offer phone help and monthly meetings. Visit RoommateFit.org or Lumaqco.org and us the  Find local support  drop down menu or click on the  Resources  tab.    [place] Breastfeeding Resources: 5-922-958-BABY (2229) toll free    National Breastfeeding Help Line trained breastfeeding peer counselors can help answer common breast-feeding questions by phone. Monday-Friday: English/Togolese  [phone]- [phone] toll free, [phone] (TTY)    [facility] Connection: [phone] ([phone])      Resources:    Childbirth and Parenting Education:       Everyday Miracles:   https://www.everyday-miracles.org/    Free Video Series from [facility]: https://nursing.[facility]/academics/specialty-areas/nurse-midwifery/having-baby-prenatal-videos/having-baby-prenatal-and    MIKE parenting center: http://[facility] Hospital[facility]rooga[facility]/   (010) 238-BABY  [place]: (education, yoga & wellness) www.[facility]  Enlightened Mama: www.enlightenedmama.com   Childbirth collective: (Parent topic nights)  www.childbirthcollective.org/  Hypnobabies:  www.hypnobabiestwincities.com/  Hypnobirthing:  Http://hypnobirthing.com/  The Birth Hour: https://theMarkLogic.Grapevine Talk/online-childbirth-class/    APPS and Podcasts:   Ryne Adams Nurture    Evidence Based Birth  The Birth Hour (for birth stories)   Birthful   Expectful   The Longest Shortest Time  PregnancyPodcast Yelitza Person    Book Recommendations:   Patsy Keshena's Birthing From Within--first few chapters include a new-age tone, you may prefer to skip it and keep going, because there is good stuff later.  This book recommendation covers emotional preparation, but does cover coping with pain, and use of both pharmacological and nonpharmacological methods.    Dr. Evans' The Pregnancy Book and The Birth Book--the pregnancy book goes month-by month      The Birth Partner  "by Sushma López    Womanly Art of Breastfeeding by La Leche League International   Bestfeeding by Hannah Smiley--great pictures    Mothering Your Nursing Toddler, by Eloise Momin.   Addresses dealing with so many of the challenging behaviors of a nursing toddler.  How Weaning Happens, by La Leche League.  Discusses weaning at all ages, from medically necessary weaning of an infant, all the way up to age 5 (or older), with why/why not, and strategies.  Very empowering book both for deciding to wean and deciding not to.    American College of Nurse-Midwives (ACNM) http://www.midwife.org/; look at the informational handouts at http://www.midwife.org/Share-With-Women     www.mymidwife.org    Mother to Baby (Medication and Herbal guidance in pregnancy): http://www.mothertobaby.org  Toll-Free Hotline: 916.405.1063  LactMed (Medication use while breastfeeding): http://toxnet.nlm.nih.gov/newtoxnet/lactmed.htm    Women's Health.gov:  http://www.womenshealth.gov/a-z-topics/index.html    American pregnancy association - http://americanpregnancy.org    Centering Pregnancy (group prenatal care option): http://centeringhealthcare.org    Information about doulas:  Childbirth collective: http://www.childbirthcollective.org/  Doulas of [place] (EFRA):  www.efra.org  [facility]  project: http://twincitiesdoulaproject.com/     Early Childhood and Family Education (ECFE):  ECFE offers parents hands-on learning experiences that will nourish a lifetime of teachable moments.  http://ecfe.info/ecfe-home/    March of Dimes www.Zapstitch.com     FDA - Nutrition  www.mypyramid.gov  Under \"For Consumers,\" click on \"pregnant and breastfeeding women.\"      Centers for Disease Control and Prevention (CDC) - Vaccines : http://www.cdc.gov/vaccines/       When researching information on the web, question the validity of websites.  The domains .gov, .edu and.org tend to be more reliable information.  If there are a lot of " "advertisements, be cautious of the information provided. Stay away from blogs and chat rooms please!             Virtual Breastfeeding Support:    During this time of isolation, breastfeeding families need even more community!  Here are some area organizations offering virtual support groups for breastfeeding:    Latch Cafe Support Group,  at 10:30 am   Run by CHRISTIE Chaney of [employer] [facility]act[employer]   Go to [employer]ispe[employer]act[employer] Facebook page and click on \"events\" for link   https://www.[employer].com/events/936557146952607/  [facility] Milk Hour,  at 2:30 pm    Run by CHRISTIE Treviño   Go to [facility] FB page and send message to get link   https://www.[employer].Buccaneer/Hera Therapeuticsundations/  [facility] holding virtual meetings the first Tuesday of each month, 8-9 pm, and the   Third Saturday, 10 - 11 am.  Go to [facility] and [place] FB page; message to get link https://www.[employer].Buccaneer/LLLofGoldenValley/?hc_location=[facility] offers a Lactation Lounge every Friday 12pm - 1pm, run by [facility] access hospital Leader   Sign up via link at ECO-SAFE/cbe-lactation   https://www.ECO-SAFE/cbe-lactation  [facility] is offering virtual support groups every Monday, 10:30 am - 12 pm, run by nurse IBCLC   Https://www.facebook.com/events/832262800591571/    Prenatal Breastfeeding Classes:      [employer] is offering virtual breastfeeding and  care classes:  https://www.ECO-SAFE/education-workshops  BirthEd childbirth and breastfeeding education offering virtual prenatal breastfeeding classes  https://www.birthedmn.com/workshops   "

## 2023-02-02 NOTE — NURSING NOTE
Prior to immunization administration, verified patients identity using patient s name and date of birth. Please see Immunization Activity for additional information.     Screening Questionnaire for Adult Immunization    Are you sick today?   No   Do you have allergies to medications, food, a vaccine component or latex?   No   Have you ever had a serious reaction after receiving a vaccination?   No   Do you have a long-term health problem with heart, lung, kidney, or metabolic disease (e.g., diabetes), asthma, a blood disorder, no spleen, complement component deficiency, a cochlear implant, or a spinal fluid leak?  Are you on long-term aspirin therapy?   No   Do you have cancer, leukemia, HIV/AIDS, or any other immune system problem?   No   Do you have a parent, brother, or sister with an immune system problem?   No   In the past 3 months, have you taken medications that affect  your immune system, such as prednisone, other steroids, or anticancer drugs; drugs for the treatment of rheumatoid arthritis, Crohn s disease, or psoriasis; or have you had radiation treatments?   No   Have you had a seizure, or a brain or other nervous system problem?   No   During the past year, have you received a transfusion of blood or blood    products, or been given immune (gamma) globulin or antiviral drug?   No   For women: Are you pregnant or is there a chance you could become       pregnant during the next month?   Yes   Have you received any vaccinations in the past 4 weeks?   No     Immunization questionnaire was positive for at least one answer.  Notified Janis Calvo CNM.        Per orders of Janis Calvo CNM, injection of Tdap given by Marlena Skinner LPN. Patient instructed to remain in clinic for 15 minutes afterwards, and to report any adverse reaction to me immediately.       Screening performed by Marlena Skinner LPN on 2/2/2023 at 4:30 PM.

## 2023-02-02 NOTE — PROGRESS NOTES
"Here Dewayne today for routine prenatal visit at 27w5d. Reports feeling well. Notes active FM and no regular UCs. Reports continued exercise at [place] [employer], running and lifting without issue but occasionally has some numbness to her feet which resolves with walking for a short period and never persistent.  Wondering about timing to modify or discontinue; did stop rowing as was difficult with her core and noted \"tenting\". Discussed continued exercise with close monitoring of her tolerance for different exercises that use her core muscles and place pressure on her pelvic floor, modifications as needed. Reviewed walk in clinic care visit and treatment for UTI with negative UC on 1/8/2022. Her most consistent symptom has been dysuria at end of void, currently no further symptoms since treatment. Increased her hydration to close to 100 fl oz (range is 65-90 mL). GCT/hgb/RPR done today. Rh negative status, antibody screen done today but will delay Rhogam administration until 34 weeks due to interval from last given. CBE through [facility]. Encouraged breastfeeding class. Advised on upcoming labs and visit schedule. Reviewed warning s/sx and reasons to call. RTC 4 weeks.    "

## 2023-02-03 LAB
ANTIBODY ID: NORMAL
SPECIMEN EXPIRATION DATE: NORMAL
T PALLIDUM AB SER QL: NONREACTIVE

## 2023-02-10 ENCOUNTER — LAB (OUTPATIENT)
Dept: LAB | Facility: CLINIC | Age: 28
End: 2023-02-10
Payer: COMMERCIAL

## 2023-02-10 DIAGNOSIS — O99.810 IMPAIRED GLUCOSE IN PREGNANCY, ANTEPARTUM: ICD-10-CM

## 2023-02-10 DIAGNOSIS — Z34.80 SUPERVISION OF OTHER NORMAL PREGNANCY, ANTEPARTUM: ICD-10-CM

## 2023-02-10 PROBLEM — O24.410 DIET CONTROLLED GESTATIONAL DIABETES MELLITUS (GDM) IN THIRD TRIMESTER: Status: ACTIVE | Noted: 2023-02-10

## 2023-02-10 LAB
GESTATIONAL GTT 1 HR POST DOSE: 206 MG/DL (ref 60–179)
GESTATIONAL GTT 2 HR POST DOSE: 160 MG/DL (ref 60–154)
GESTATIONAL GTT 3 HR POST DOSE: 94 MG/DL (ref 60–139)
GLUCOSE P FAST SERPL-MCNC: 72 MG/DL (ref 60–94)

## 2023-02-10 PROCEDURE — 36415 COLL VENOUS BLD VENIPUNCTURE: CPT

## 2023-02-10 PROCEDURE — 82952 GTT-ADDED SAMPLES: CPT

## 2023-02-10 PROCEDURE — 82951 GLUCOSE TOLERANCE TEST (GTT): CPT

## 2023-02-11 DIAGNOSIS — O24.410 DIET CONTROLLED GESTATIONAL DIABETES MELLITUS (GDM) IN THIRD TRIMESTER: Primary | ICD-10-CM

## 2023-02-23 ENCOUNTER — VIRTUAL VISIT (OUTPATIENT)
Dept: EDUCATION SERVICES | Facility: CLINIC | Age: 28
End: 2023-02-23
Payer: COMMERCIAL

## 2023-02-23 DIAGNOSIS — O24.410 DIET CONTROLLED GESTATIONAL DIABETES MELLITUS (GDM) IN THIRD TRIMESTER: ICD-10-CM

## 2023-02-23 PROCEDURE — G0108 DIAB MANAGE TRN  PER INDIV: HCPCS | Mod: VID | Performed by: DIETITIAN, REGISTERED

## 2023-02-23 PROCEDURE — 99207 PR DROP WITH A PROCEDURE: CPT | Mod: VID | Performed by: DIETITIAN, REGISTERED

## 2023-02-23 RX ORDER — LANCETS
EACH MISCELLANEOUS
Qty: 100 EACH | Refills: 6 | Status: SHIPPED | OUTPATIENT
Start: 2023-02-23 | End: 2023-05-11

## 2023-02-23 RX ORDER — GLUCOSAMINE HCL/CHONDROITIN SU 500-400 MG
CAPSULE ORAL
Qty: 100 EACH | Refills: 3 | Status: SHIPPED | OUTPATIENT
Start: 2023-02-23 | End: 2023-05-11

## 2023-02-23 NOTE — PROGRESS NOTES
Diabetes Self-Management Education & Support  Type of service:  Video Visit    If the video visit is dropped, the video visit invitation should be resent by: Text to cell phone: [phone]    Originating Location (pt. Location): Home  Distant Location (provider location): Home  Mode of Communication:  Video Conference via [employer]    Video Start Time: 9:04 AM  Video End Time (time video stopped): 9:35 AM    How would patient like to obtain AVS? MyChart      SUBJECTIVE/OBJECTIVE:  Presents for education related to gestational diabetes.    Accompanied by: Self  Diabetes management related comments/concerns: what the expectations are  Gestational weeks: 30w5d  Hospital planned for delivery: [facility] - [facility]  Next OB Visit Date: 03/02/23  Number of previous pregnancies: 0  Had any babies over 9 lbs: No  Previously had Gestational Diabetes: No  Have you ever had thyroid problems or taken thyroid medication?: No  Heart disease, mitral valve prolapse or rheumatic fever?: No  Hypertension : No  High Cholesterol: No  High Triglycerides: No  Do you use tobacco products?: No  Do you drink beer, wine or hard liquor?: No    Cultural Influences/Ethnic Background:  Not  or     Estimated Date of Delivery: Apr 29, 2023    1 hour OGTT  Lab Results   Component Value Date    GLU1 137 (H) 02/02/2023         3 hour OGTT    Fasting  Lab Results   Component Value Date    GTTGF 72 02/10/2023       1 hour  Lab Results   Component Value Date    GTTG1 206 (H) 02/10/2023       2 hour  Lab Results   Component Value Date    GTTG2 160 (H) 02/10/2023       3 hour  Lab Results   Component Value Date    GTTG3 94 02/10/2023       Lifestyle and Health Behaviors:  Pre-pregnancy weight (lbs): 176  Exercise:: Yes  Days per week of moderate to strenuous exercise (like a brisk walk): 5  On average, minutes per day of exercise at this level: 50  How intense was your typical exercise? : Heavy (like jogging or swimming  Exercise Minutes  per Week: 250  Meal planning/habits: None  How many times a week on average do you eat food made away from home (restaurant/take-out)?: 1  Meals include: Breakfast, Lunch, Dinner  Breakfast: egg wrap, yogurt, strawberries. OR [employer]bucks egg bites and chocolate croisant  Lunch: salad kits  with yogurt and carrots. Leftovers. Carlos Morelos (sub)  Dinner: Every plate meals. Pasta or rice meal with a protein and a vegetables OR couscous  Snacks: sweets, chips or triscuts, cereal  Beverages: Water, Coffee, Diet soda (cyrstal light drinks)  Biggest challenges to healthy eating: None  Pre- vitamin?: Yes  Supplements?: Yes  List supplements currently taking: baby aspirin  Experiencing nausea?: No  Experiencing heartburn?: Yes    Healthy Coping:  Emotional response to diabetes: Ready to learn  Informal Support system:: Family  Stage of change: ACTION (Actively working towards change)    Current Management:  Taking medications for gestational diabetes?: No  Difficulty affording diabetes medication?: No  Difficulty affording diabetes testing supplies?: Yes    ASSESSMENT:  Discussed carbohydrate sources and impact on blood glucose. Reviewed basics of healthy eating and incorporating a variety of foods into meal plan. Instructed on carbohydrate counting and label reading and recommended patient consume 2 CHO for breakfast, 3-4 CHO for lunch and dinner and 1-2 CHO for each snack, 3 snacks a day.  Encouraged protein with bedtime snack.      Discussed importance of not going too low in carbohydrates since that may cause liver to produce excess glucose and contribute to elevated blood glucose readings and ketone formation.  To also help prevent against ketone formation, protein was encouraged with meals and snacks, especially with the night snack. Reviewed benefits of exercising to help lower blood glucose and walking after meals, as tolerated and per MD approval, if seeing elevated blood glucose after a meal. Pt verbalized  understanding of concepts discussed and recommendations provided.    Patient is active at [employer] 5 days a week. Reviewed sweets intake and nelsy planning. Patient's brother has type 1 DM, so she is familiar with carb counting and checking BG.     INTERVENTION:  Glucose meter sent to pharmacy with ketone strips.    Educational topics covered today:  GDM diagnosis, pathophysiology, Risks and Complications of GDM, Means of controlling GDM, Using a Blood Glucose Monitor, Blood Glucose Goals, Logging and Interpreting Glucose Results, Ketone Testing, When to Call a Diabetes Educator or OB Provider, Healthy Eating During Pregnancy, Counting Carbohydrates, Meal Planning for GDM, and Physical Activity    Educational materials provided today:   [place] Understanding Gestational Diabetes  GDM Log Book    Pt verbalized understanding of concepts discussed and recommendations provided today.     PLAN:  Check glucose 4 times daily, before breakfast and 1 hour after each meal.     Check Ketones daily for one week, if negative, reduce testing to once a week.     Physical activity recommended: continue activity as able.    Meal plan: 2 carbs at breakfast, 4 carbs at lunch, 4 carbs at supper, 1-2 carbs at 3 snacks a day.  Follow consistent CHO meal plan, eat CHO and protein/fat at all meals/snacks.    Call/e-mail/[employer] message diabetes educator if 3 or more blood sugars are above the goal in 1 week, if ketones are positive, or with questions/concerns.    Time Spent: 30 minutes  Encounter Type: Individual    Any diabetes medication dose changes were made via the CDE Protocol and Collaborative Practice Agreement with the patient's referring provider. A copy of this encounter was shared with the provider.

## 2023-02-23 NOTE — PATIENT INSTRUCTIONS
Goals for Gestational Diabetes Care:    1. Eat balanced meals (3 meals + 3 snacks daily)    Breakfast: 30 grams carbohydrate + Protein  Snack: 15-30 grams carbohydrate + protein  Lunch: 45-60 grams carbohydrate + protein/vegetables  Snack: 15-30 grams Carbohydrate + protein  Dinner: 45-60 grams carbohydrate + protein/vegetables  Bedtime Snack: 15-30 grams + protein    ----Make sure you include protein source with each meal and at bedtime - this has been shown to help with blood glucose elevations    2. Each Morning Check Ketones (small/mod/high - call Diabetes Care Line)  Your goal is negative or trace ketones. If you have ketones in your urine it means you are not eating enough before you go to bed. Eat a larger bedtime snack and include protein.     3. Aim to get at least 30 minutes of activity each day. Activity really helps improve blood sugars.     4. Blood Glucose Targets:   1. Fasting Less than 95 mg/dL   2. 1 hours after a meal target is less than 140 mg/dL  ----Always remember to bring meter and log book to all appointments.    Follow up with your Diabetic Educator to assess BG targets in 1 week.  If Blood glucose levels are above normal 3 times or more in one week and you cannot explain them or if you develop small, moderate or high ketones call Diabetes Care at [phone]    Call with any questions.    Thank you,  Enedina Obando RDN, ALFRED, [facility]   Certified Diabetes Care &   [phone]

## 2023-03-02 ENCOUNTER — PRENATAL OFFICE VISIT (OUTPATIENT)
Dept: MIDWIFE SERVICES | Facility: CLINIC | Age: 28
End: 2023-03-02
Payer: COMMERCIAL

## 2023-03-02 VITALS
HEIGHT: 63 IN | HEART RATE: 64 BPM | DIASTOLIC BLOOD PRESSURE: 60 MMHG | BODY MASS INDEX: 35.05 KG/M2 | WEIGHT: 197.8 LBS | SYSTOLIC BLOOD PRESSURE: 110 MMHG

## 2023-03-02 DIAGNOSIS — Z34.80 SUPERVISION OF OTHER NORMAL PREGNANCY, ANTEPARTUM: Primary | ICD-10-CM

## 2023-03-02 PROCEDURE — 99207 PR PRENATAL VISIT: CPT | Performed by: ADVANCED PRACTICE MIDWIFE

## 2023-03-02 NOTE — PATIENT INSTRUCTIONS
"https://mothertobaby.org/fact-sheets/marijuana-pregnancy/    https://Muzicall.org/baby-blog/alcohol-tobacco-marijuana-breastfeeding/    As discussed, check out the website: [employer] for more information about \"The Three Principles in Pregnancy\" movements/stretches to prepare for labor and birth. Under the tab \"learn more\" and \"techniques,\" there is an abundance of information about ways to support the lengthening and balance of your pelvic floor in pregnancy.    http://W5 Networks/start/in-pregnancy/the-3-principles-in-pregnancy/    \"We hope you had a positive experience and that you can definitely recommend [facility] to your family and friends. You ll be receiving a survey soon and we look forward to hearing your feedback\".    [facility]  - Contact information:  Appointment line and to get a hold of CNM in clinic Monday-Friday 8 am - 5 pm:  [phone].  There are some clinics with early start times (1st appointment 7:40 am) and others with evening hours (last appointment 6:20 pm).  Most are typically open from 8 am to 5 pm.    CNM on call answering service: [phone].  Specify your hospital of choice and leave a brief message for CNM;  will then page CNM who is on call at your specified hospital and you should receive a call back with 15 minutes.  Be sure that your ringer is audible and that you can accept blocked calls so that we can get back in touch with you! This number should be reserved for urgent needs if during the day, before 8 am, after 5 pm, weekends, holidays.    Contact the on-call CNM with warning signs, such as:  vaginal bleeding   Vaginal discharge and itching or pain and burning during urination  Leg/calf pain or swelling on one side  severe abdominal pain  nausea and vomiting more than 4-5 times a day, or if you are unable to keep anything down  fever more than 100.4 degrees F.     MyChart  After each of your " "visits you are welcome to check ColoWrap for your visit summary including education and links to information relevant to your pregnancy and/or well woman care.   Find the \"Visits\" tab at the top of the page, you will see a list of recent visits and for each visit a for link for \"View After Visit Summary.\" View of your After Visit Summary will allow you to read our recommendations from your visit, review any education provided, and link to websites with useful information.   If you have any questions or difficulty navigating [facility], please feel free to contact us and we will do our best to direct you.  Meet the Midwives from [facility]  You are invited to an informational meet and greet with [facility]'s [facility] Certified Nurse-Midwives. Our free \"Meet the Midwives\" event is a great opportunity to learn about our midwives' philosophy and experience, the hospitals where we can assist with your birth, and answer questions you may have. Partners, friends, and family are welcome to attend. Currently, this is a virtual event.  Date  Last Thursday of every month at 7 pm.    Link to next (live) meeting  [email or web address]://[email or web address]/classes-and-events/meet-the-midwives-from-[facility]  To Join by Telephone (audio only) Call:   [phone] Phone Conference ID: [phone]#      Touring the Maternity Care Center  At this time we are offering a virtual tour of the Maternity Care Centers at both [facility]:   [facility] Maternity Care:   https://[facility].org/locations/the-birthplace-at-[facility] Maternity Care:   https://[place].org/locations/the-[facility]      Breastfeeding and Birth Control  How do I decide what birth control method is best for me while I am breastfeeding?  Choosing a method of birth control is very personal. First, answer the following " questions:     Do you want to have more children?   How much spacing between births do you want for your children?   Do you smoke or have you had any health problems, such as liver disease or a blood clot?  Talk about the answers to each of these questions with your health care provider to help you choose the best method for you.    Can I use breastfeeding as my birth control?  Using breastfeeding as your birth control (the lactational amenorrhea method) can be a good way to keep from getting pregnant in the first months after the baby is born. Each time your baby nurses, your body releases a hormone called prolactin, which stops your body from making the hormones that cause you to ovulate (release an egg). If you are not ovulating, you cannot get pregnant.    The lactational amenorrhea method works only if:   you have not started your period yet.   you are breastfeeding only and not giving your baby any other food or drink.   you are breastfeeding at least every 4 hours during the day and every 6 hours at night.   your baby is less than 6 months old.  When any 1 of these 4 things is not happening, you no longer have good protection from getting pregnant, and you should use another form of birth control.    What birth control methods are safe for me to use while I breastfeed?    Methods without hormones  Methods without hormones do not affect you, your baby, or your breastfeeding.  Methods without hormones that are the most effective   The copper intrauterine contraceptive device (IUD) (ParaGard) is a small, T-shaped device that is in- serted into your uterus (womb) through the vagina and cervix. The copper IUD lasts for 10 years.   Sterilization (getting your tubes tied or your partner having a vasectomy) is very effective, but it is per- manent. You should choose sterilization only if you do not want to have more children.  A method without hormones that is effective   The lactational amenorrhea method described  above is effective for the first 6 months.  Methods without hormones that are less effective   Natural family planning is monitoring your body for signs of ovulation and not having sex when you think you are ovulating. This method is reliable only if you are having regular periods every month.   Barrier methods (condoms, diaphragms, sponges, and spermicides) are used at the time you have sex. These methods are effective only if you use them correctly every time.    Methods with hormones  Birth control methods that use hormones can be used while you are breastfeeding. They may have a small effect on lowering the amount of milk you make. All hormones will get into your breast milk in very small amounts, but there is no known harm to your baby from this small amount of hormone in breast milk.only methodsmethods use only 1 hormone, called progestin. You can start them right after your baby is born or wait 4 to 6 weeks to make sure your milk supply is good.   Progestin-only pills ( minipills ): If you like to take pills every day, you can use the minipill. In order forpill to work well, you have to take 1 at the same time each day. When you stop breastfeeding, you should start pills that have both estrogen and progestin because they are better at keeping you from get- ting pregnant.   Progestin IUD (Mirena): The progestin IUD is shaped and inserted into the uterus like the copper IUD. It works for up to 5 years. Both IUDs are usually inserted 4 to 6 weeks after the baby is born.  Progestin implant (Nexplanon): The progestin implant is a small matchstick-sized flexible davian. It is placed into the fatty tissue in the back of your arm. It works for up to 3 years.  Progestin shot (Depo-Provera): The progestin shot is given every 3 months.    estrogen and progestin methods  These methods use 2 hormones, called estrogen and progestin.     These methods increase your risk of a blood clot, which is already higher than normal  after you have a baby. You should not use them until your baby is at least 6 weeks old. The combined methods are not recommended as the first choice for women who are breastfeeding. If a combined method is the one that you feel will be best for you to prevent getting pregnant, these methods are okay to use while breastfeeding.   Combined birth control pills: You take a pill each day.  Vaginal ring (NuvaRing): The ring is worn in the vagina for 3 weeks then left out for 1 week before youin a new ring.  Patch (Ortho Evra): The patch is placed on your skin and changed every week for 3 weeks then left off forweek before putting a new patch on a different area of your skin.    Pediatric Care Providers at [facility]:    Choosing the right provider is one of the most important decisions you ll make about your health care. We can help you find the right one. Remember, you re looking for a provider you can trust and work with to improve your health and well-being, so take time to think about what you need. Depending on how complicated your health care needs are, you may need to see more than one type of provider.    Primary Care Providers: You ll see a primary care provider first for most health issues. They ll work with you to get your recommended screenings, help you manage chronic conditions, and refer you to other types of providers if you need them. Your primary care provider may be called a family physician or doctor, internist, general practitioner, nurse practitioner, or physician s assistant. Your child or teenager s provider may be called a pediatrician.  Specialists: You ll see a specialist for certain services or to treat specific conditions. Specialists include cardiologists, oncologists, psychologists, allergists, podiatrists, and orthopedists. You may need a referral from your primary care provider before you go to a specialist in order for your health plan to pay for your  visit.\pardHere are some tips for finding a provider where you live:  If you already have a provider you like and want to keep working with, call their office and ask if they accept your coverage.  Call your insurance company or state Medicaid and CHIP program. Look at their website or check your member handbook to find providers in your network who take your health coverage.  Ask your friends or family if they have providers they like and use these tools to compare health care providers in your area.    Family Medicine at  [facility]:     https://[email or web address]/specialties/family-medicine    Many of our families enjoy all seeing the same doctor, who comes to know the whole family very well. We base our practice on the knowledge of the patient in the context of family and community.  WHY CHOOSE A FAMILY MEDICINE PHYSICIAN?  Ability of the whole family to see the same doctor  Focus on the whole person, including physical and emotional health  A personal relationship with their doctor that is nurtured over time  Respect for individual and family beliefs and values  No need to change primary care providers when a certain age is reached  Coordination of care when other health care services are needed    Pediatrics at  [facility]:   [email or web address]://[email or web address]/specialties/pediatric-care    Through a teaching affiliation with the [facility], [facility] staff keeps current on new developments in the field of pediatrics.     Everything we do centers around caring for children. We place special emphasis on wellness and prevention.pediatric care team includes a team of pediatricians and certified nurse practitioners who provide care to pediatric and adolescent patients ages 0 to 18, and some up to the age of 26. We offer preventive health maintenance for healthy children as well the diagnosis and treatment of common and chronic  illnesses and injuries. In addition, we also offer several pediatric specialists who focus on adolescent health issues and developmental and behavioral issues.    Circumcision    Educational video:     https://www.Crowdsourcing.org.Trendlines [email or web address]    What is circumcision?  At birth, baby boys have loose skin that covers the head of the penis. This skin is called the foreskin. When all or part of the foreskin of the penis is cut off, this is called circumcision.  Why is circumcision done?  Circumcision is done for many reasons including Pentecostalism, cultural, looks, and health. Some Pentecostalism groups circumcise all boys as a jason-based practice. Many people in the [place] choose to circumcise their baby boys because they believe it is culturally normal. It is not a common practice in [place], [place], or [place].  Some parents choose circumcision so that their son will have a penis that looks like his father s if the father was also circumcised. Other people choose circumcision because they believe it is  or will protect the boy or man from infection or cancer later in life.  Does circumcision protect against infection or cancer?  Circumcision does seem to protect against some types of infection or cancer. Cancer of the penis is one type of cancer that circumcision may prevent. However, cancer of the penis is very rare. One hundred thousand circumcisions would need to be done to prevent one case of cancer of the penis. Circumcision may also decrease the chance of some sexually transmitted infections, such as HIV and human papilloma virus (HPV). See the next page for more information on the risks and benefits of circumcision.  What happens during a circumcision?  Babies born in the hospital are usually circumcised before they go home. Health care providers also perform circumcisions in their offices and clinics within a few weeks after birth.  Sikhism circumcisions are most often  done at home or in a Latter day.  Before the circumcision is performed, some providers give an injection (shot) of a small amount of anesthetic (numbing medicine) at the base of the penis to block the pain or put an anesthetic cream on the penis to numb the area that will be cut.  There are 2 different ways to do a circumcision. In one type, a clamp placed around the head of the penis cuts off the blood supply to the foreskin, and the foreskin above the clamp is cut off. The clamp is left on the penis until the area heals and it falls off a few days later. In another type of circumcision, the foreskin is cut off with scissors or a scalpel.  After the circumcision, petroleum jelly and sometimes gauze may be put over the area of the penis where the skin was removed. This protects the end of the penis while it heals.  Can I keep my son s penis  if it is circumcised?  Regular washing with soap and water will keep any penis clean. Circumcision does not make the penis . Uncircumcised boys do need to be taught to clean beneath their foreskin, just like they need to be taught to wash their hands or brush their teeth.  How do I decide if I should have my son circumcised?  The American Academy of Pediatrics (AAP) says that  circumcision may have health benefits. They do not recommend circumcision for all boys as a routine procedure. The AAP recommends that you talk to your health care provider to decide if circumcision is the right choice for your family. You may also wish to discuss the question with your family or .  What are the risks and benefits of circumcision?  We do not have a lot of good scientific information about the health risks or benefits of circumcision.  Possible Risks:  Very few baby boys (less than 1 in 100) will have a problem after circumcision, such as bleeding or mild infection of the penis. These problems are usually not serious and are easy to treat.  Less common  problems are:   Removal of too much or too little foreskin  Some rare problems are:   Narrowing of the opening of the penis, which can cause problems with urination   Removal of part of the penis or death of some of the other skin on the penis  Infection that spreads to other parts of the body  People used to think babies did not really feel pain. Now we know that they do. Many baby boys appear to feel a lot of pain during circumcision if anesthesia is not used.  We do not know if circumcision affects sexual function or sensation.  Possible Benefits:   Less risk for some kinds of cancers, like cancer of the penis   Fewer urinary tract (bladder or kidney) infections for babies   Less risk for some sexually transmitted infections, such as HIV, herpes, and HPV    May protect female sexual partners from some sexually transmitted infections    For More Information  American Academy of Family Physicians: Circumcision  http://familydoctor.org/familydoctor/en/pregnancy-newborns/caring-for-newborns/infant- care/circumcision.html  MedlinePlus: Circumcision (includes a slide show on the procedure)  www.nlm.nih.gov/medlineplus/circumcision.html  American Academy of Pediatrics: Policy statement on circumcision  Http://pediatrics.aappublications.org/content/130/3/e756.abstract      Childbirth and Parenting Education:         Everyday Miracles:   https://www.everyday-miracles.org/    Free Video Series from [facility]: https://nursing.[facility]/academics/specialty-areas/nurse-midwifery/having-baby-prenatal-videos/having-baby-prenatal-and    MIKE parenting [place]: http://GigmaxFremont HospitalMogi/   (898) 529-BABY  [place]: (education, yoga & wellness) www.City Voicea.SurroundsMe  Enlightened Mama: www.enlightenedmama.com   Childbirth collective: (Parent topic nights)  www.childbirthcollective.org/  Hypnobabies:  www.hypnobabiestSionic Mobilecities.com/  Hypnobirthing:  Http://hypnobirthing.com/  The Birth Hour:  https://theInteractive Project.[employer]/online-childbirth-class/    APPS and Podcasts:   Ryne Adams Nurture    Evidence Based Birth  The Birth Hour (for birth stories)   Birthful   Expectful   The Longest Shortest Time  PregnancyPodcflavia Person    Book Recommendations:   Patsy Biju's Birthing From Within--first few chapters include a new-age tone, you may prefer to skip it and keep going, because there is good stuff later.  This book recommendation covers emotional preparation, but does cover coping with pain, and use of both pharmacological and nonpharmacological methods.    Dr. Evans' The Pregnancy Book and The Birth Book--the pregnancy book goes month-by month      The Birth Partner by Sushma López    Womanly Art of Breastfeeding by La Leche League International   Bestfeeding by Hannah Smiley--great pictures    Mothering Your Nursing Toddler, by Eloise Momin.   Addresses dealing with so many of the challenging behaviors of a nursing toddler.  How Weaning Happens, by La Leche League.  Discusses weaning at all ages, from medically necessary weaning of an infant, all the way up to age 5 (or older), with why/why not, and strategies.  Very empowering book both for deciding to wean and deciding not to.    American College of Nurse-Midwives (ACNM) http://www.midwife.org/; look at the informational handouts at http://www.midwife.org/Share-With-Women     www.mymidwife.org    Mother to Baby (Medication and Herbal guidance in pregnancy): http://www.mothertobaby.org  Toll-Free Hotline: 602.823.7747  LactMed (Medication use while breastfeeding): http://toxnet.nlm.nih.gov/newtoxnet/lactmed.htm    Women's Health.gov:  http://www.womenshealth.gov/a-z-topics/index.html    American pregnancy association - http://americanpregnancy.org    Centering Pregnancy (group prenatal care option): http://centeringhealthcare.org    Information about doulas:  Childbirth collective: http://www.childbirthcollective.org/  Doulas of [place]  "(EFRA):  www.efra.org  WindSim project: http://Judys Bookcitiesdoulaproject.com/     Early Childhood and Family Education (ECFE):  ECFE offers parents hands-on learning experiences that will nourish a lifetime of teachable moments.  http://ecfe.info/ecfe-home/    March of Dimes www.Biofortuna.AnyLeaf     FDA - Nutrition  www.mypyramid.gov  Under \"For Consumers,\" click on \"pregnant and breastfeeding women.\"      Centers for Disease Control and Prevention (CDC) - Vaccines : http://www.cdc.gov/vaccines/       When researching information on the web, question the validity of websites.  The [employer] .gov, Mandata (Management & Data Services) andPopularMediaorg tend to be more reliable information.  If there are a lot of advertisements, be cautious of the information provided. Stay away from blogs and chat rooms please!             Virtual Breastfeeding Support:    During this time of isolation, breastfeeding families need even more community!  Here are some area organizations offering virtual support groups for breastfeeding:    [facility] [employer] Support Group, Tuesdays at 10:30 am   Run by CHRISTIE Chaney of [employer]   Go to [employer] Facebook page and click on \"events\" for link   https://www.Activity Rocket.com/events/[email or web address]/  [facility] Milk Hour, Thursdays at 2:30 pm    Run by CHRISTIE Treviño   Go to [facility] FB page and send message to get link   https://www.Activity Rocket.com/healthfoundations/  Manish Bryant [facility] holding virtual meetings the first Tuesday of each month, 8-9 pm, and the   Third Saturday, 10 - 11 am.  Go to [facility] [place] FB page; message to get link [email or web address]://www.Activity Rocket.com/AbdonfGClair/?hc_location=[facility]  [place] offers a Lactation Lounge every Friday 12pm - 1pm, run by Manish [place] Leader   Sign up via link at " Pond5/cbe-lactation   https://www.Pond5/cbe-lactation  [facility] is offering virtual support groups every Monday, 10:30 am - 12 pm, run by nurse CHRISTIE   Https://www.facebook.com/events/900437867420701/    Prenatal Breastfeeding Classes:      [employer] is offering virtual breastfeeding and  care classes:  https://www.Pond5/education-workshops  BirthEd childbirth and breastfeeding education offering virtual prenatal breastfeeding classes  https://www.Meltymn.com/workshops    Breastfeeding clinic visits are at [facility] on , [facility] on  and [facility] on . Call to schedule, [phone].    New Parent Connection:   Kayla Caldera, [address] [facility]  In-person meetings on  from 6 pm - 7:15 pm for parents of  to 9 months, at the same site.   All are free, drop-in, no registration required.    There are also free virtual meetings ongoing on :  11:30 am - 12:30 pm for parents of newborns to 3 months  4:15 pm to 5:15 pm for parents of 3 to 9-month olds  For joining info parents should call Yen Stone at [phone]

## 2023-03-02 NOTE — PROGRESS NOTES
Here with Dewayne today for routine prenatal visit at 31w5d. Reports feeling well. Notes active FM and no regular UCs. Discussed recent GDM diagnosis, wonders if she really has diabetes as she tested her normal foods and found normal BS levels 1 hr PP following her baby shower (cake etc) and following her normal breakfast of starbucks coffee, chocolate crossoint, and egg bites. Is noting elevated fasting and describes a routine of eating dinner at 8pm, snack of chocolate ice cream, waking at 0345 to go to the gym. Discussed 8 hour fasting is what we are looking for and would recommend either no snack before bed or consider a high protein options. Reviewed BS log and Diabetes Ed visit notes. FS: 83-90 (97), 1 hr PP: 90s-137 (0 elevations). Has Diabetes Ed visit on Monday and will review log then and verbalized understanding to bring her log to her CNM visits as well. Brief discussion of labor plan and discussed pain management options, support persons, and CBE. Has questions about breastfeeding and breast pump, inquired about recommendations for marijuana use in breastfeeding period as has abstained in pregnancy and wondering is safe to resume following pregnancy. Given article on best evidence for alcohol and marijuana and relative infant dose with breastfeeding via [employer], also advised lactation will reach out by Sherine. Given breast pump in clinic today. Discussed peds options and how to choose. Advised on upcoming labs and visit schedule. Reviewed warning s/sx and reasons to call. RTC 2 weeks.

## 2023-03-04 ENCOUNTER — TELEPHONE (OUTPATIENT)
Dept: OBGYN | Facility: CLINIC | Age: 28
End: 2023-03-04
Payer: COMMERCIAL

## 2023-03-04 DIAGNOSIS — U07.1 INFECTION DUE TO 2019 NOVEL CORONAVIRUS: ICD-10-CM

## 2023-03-04 NOTE — TELEPHONE ENCOUNTER
PHONE CALL TO ON-CALL CNM FROM PATIENT AT 0815:    Mirian calls reporting a positive COVID test this AM    Symtoms began yesterday evening, and have progressed since she woke up this morning prompting her to take a COVID test.  She is experiencing head/nasal congestion, coughing, and body aches.  No fever, no GI symptoms, no shortness of breath.    Mirian is Pfizer vaccinated X4, and also had COVID illness in the past      Reviewed general self-care during illness.  Talked about OTC medications she can take to manage symptoms.  Educated about need for isolation X5 days from anyone who is not positive for COVID themselves, and then for masking days 6-10.  Discussed the use of a pulse oximeter to evaluate oxygen saturations during her illness. Mirian doesn't have one, but will consider the opiton of purchasing or borrowing one.  Finally, discussed the use of Paxlovid. After discussion, Mirian elects to not have a RX for Paxlovid ordered.  She will call back in the next 24 hour if she changes her mind.    Knows to seek emergency care (ED or 911 PRN) with any shortness of breath, loss of consciousness, or low oxygen levels.  Additionally, will call on-call CNM with any questions, or with concern about fetal movement, S/S of  labor, SROM, bleeding or other pregnancy concerns. seek

## 2023-03-06 ENCOUNTER — VIRTUAL VISIT (OUTPATIENT)
Dept: EDUCATION SERVICES | Facility: CLINIC | Age: 28
End: 2023-03-06
Payer: COMMERCIAL

## 2023-03-06 DIAGNOSIS — O24.410 DIET CONTROLLED GESTATIONAL DIABETES MELLITUS (GDM) IN THIRD TRIMESTER: Primary | ICD-10-CM

## 2023-03-06 PROCEDURE — 98967 PH1 ASSMT&MGMT NQHP 11-20: CPT | Mod: VID | Performed by: DIETITIAN, REGISTERED

## 2023-03-06 NOTE — Clinical Note
3/6/2023         RE: Mirian Cordova  [address] [place] [address]        Dear Colleague,    Thank you for referring your patient, Mirian Cordova, to the [facility]. Please see a copy of my visit note below.    DATE Ketones Fasting BG (mg/dL) 1 hour Post Breakfast BG (mg/dL) 1 hour Post Lunch BG (mg/dL) 1 hour post Dinner BG (mg/dL)   3/6 trace 82 - - -   3/5 trace 89 112 119 114   3/4 trace 78 115 124 133   3/3 trace 91 115 87 106   3/2 trace 86 108 101 123   3/1 trace 85 133 92 96   2/28 trace 83 121 85 113   2/27 trace 97 - baby shower  102 96 112

## 2023-03-06 NOTE — LETTER
3/6/2023         RE: Mirian Cordova  [address] [place] [address]        Dear Colleague,    Thank you for referring your patient, Mirian Cordova, to the [facility]. Please see a copy of my visit note below.    Diabetes Self-Management Education & Support    Type of service:  Video Visit    If the video visit is dropped, the video visit invitation should be resent by: Text to cell phone: [phone]    Originating Location (pt. Location): Home  Distant Location (provider location): Home  Mode of Communication:  Video Conference via [employer] Start Time: 7:57 AM  Video End Time (time video stopped): 8:13 AM    How would patient like to obtain AVS? MyChart    SUBJECTIVE/OBJECTIVE:  Presents for education related to gestational diabetes.    Accompanied by: Self  Diabetes management related comments/concerns: what the expectations are  Gestational weeks: 32w2d  Next OB Visit Date: 03/20/23  Number of previous pregnancies: 0  Had any babies over 9 lbs: No  Previously had Gestational Diabetes: No  Have you ever had thyroid problems or taken thyroid medication?: No  Heart disease, mitral valve prolapse or rheumatic fever?: No  Hypertension : No  High Cholesterol: No  High Triglycerides: No  Do you use tobacco products?: No  Do you drink beer, wine or hard liquor?: No    Cultural Influences/Ethnic Background:  Not  or     LMP 07/15/2022 (Exact Date)       Estimated Date of Delivery: Apr 29, 2023    Blood Glucose/Ketone Log:   DATE Ketones Fasting BG (mg/dL) 1 hour Post Breakfast BG (mg/dL) 1 hour Post Lunch BG (mg/dL) 1 hour post Dinner BG (mg/dL)   3/6 trace 82 - - -   3/5 trace 89 112 119 114   3/4 trace 78 115 124 133   3/3 trace 91 115 87 106   3/2 trace 86 108 101 123   3/1 trace 85 133 92 96   2/28 trace 83 121 85 113   2/27 trace 97 - baby shower night before 102 96 112       Lifestyle and Health Behaviors:  Pre-pregnancy weight (lbs):  176  Exercise:: Yes  Days per week of moderate to strenuous exercise (like a brisk walk): 5  On average, minutes per day of exercise at this level: 50  How intense was your typical exercise? : Heavy (like jogging or swimming  Exercise Minutes per Week: 250  Meal planning/habits: None  How many times a week on average do you eat food made away from home (restaurant/take-out)?: 1  Meals include: Breakfast, Lunch, Dinner  Breakfast: egg wrap, yogurt, strawberries. OR starbucks egg bites and chocolate croisant OR peanut butter on a wrap with an apple OR cereal  Lunch: salad kits with yogurt and carrots. Leftovers. Carlos Morelos (sub)  Dinner: Every plate meals. Pasta or rice meal with a protein and a vegetables OR couscous OR egg salad  Snacks: sweets, chips or triscuts, cereal  Other: trying to drink 60 ounces a day  Beverages: Water, Coffee, Diet soda (cyrstal light drinks)  Biggest challenges to healthy eating: None  Pre- vitamin?: Yes  Supplements?: Yes  List supplements currently taking: baby aspirin  Experiencing nausea?: No  Experiencing heartburn?: Yes    Healthy Coping:  Emotional response to diabetes: Ready to learn  Informal Support system:: Family  Stage of change: ACTION (Actively working towards change)    Current Management:  Taking medications for gestational diabetes?: No  Difficulty affording diabetes medication?: No  Difficulty affording diabetes testing supplies?: Yes    ASSESSMENT:  Ketones: 100%.   Fasting blood glucoses:86% in target.  After breakfast: 100% in target.  After lunch: 100% in target.  After dinner: 100% in target.    Met with patient today - she is doing well managing DM, still remaining active and monitoring po intake. She had one elevated fasting BG after her baby shower day and eating more sweets. The rest of the BG have been controlled well. Reviewed BG goals, when to check in if BG rise and post partum follow up.   Answered questions.    INTERVENTION:  Educational topics  covered today:  What to expect after delivery, Future testing for Type 2 diabetes (2 hour OGTT at 6 week post-partum check-up and annual fasting blood glucose level), Risk of GDM and planning ahead for future pregnancies, Recommended lifestyle interventions for reducing the risk of Type 2 Diabetes, When to Call a Diabetes Educator or OB Provider    Educational Materials provided today:  [facility] Preventing Diabetes    PLAN:  Check glucose 4 times daily.  Check ketones once a week when readings are consistently negative.  Continue with recommended physical activity.  Continue to follow recommended meal plan: 2 carbs at breakfast, 4 carbs at lunch, 4 carbs at supper, 1-2 carbs at snacks.  Follow consistent CHO meal plan, eat CHO and protein/fat at all meals/snacks.    Call/e-mail/MyChart message diabetes educator if 3 or more blood sugars are above the goal in 1 week or if ketones are positive.    Time Spent: 16 minutes  Encounter Type: Individual    Any diabetes medication dose changes were made via the CDE Protocol and Collaborative Practice Agreement with the patient's referring provider. A copy of this encounter was shared with the provider.

## 2023-03-06 NOTE — PATIENT INSTRUCTIONS
Goals for Gestational Diabetes Care:    1. Eat balanced meals (3 meals + 3 snacks daily)    Breakfast: 30 grams carbohydrate + Protein  Snack: 15-30 grams carbohydrate + protein  Lunch: 45-60 grams carbohydrate + protein/vegetables  Snack: 15-30 grams Carbohydrate + protein  Dinner: 45-60 grams carbohydrate + protein/vegetables  Bedtime Snack: 15-30 grams + protein    ----Make sure you include protein source with each meal and at bedtime - this has been shown to help with blood glucose elevations    2. Each Morning Check Ketones (small/mod/high - call Diabetes Care Line)  Your goal is negative or trace ketones. If you have ketones in your urine it means you are not eating enough before you go to bed. Eat a larger bedtime snack and include protein.     3. Aim to get at least 30 minutes of activity each day. Activity really helps improve blood sugars.     4. Blood Glucose Targets:   1. Fasting Less than 95 mg/dL   2. 1 hours after a meal target is less than 140 mg/dL  ----Always remember to bring meter and log book to all appointments.      Follow up with your Diabetic Educator to assess BG targets in 1 week.  If Blood glucose levels are above normal 3 times or more in one week and you cannot explain them or if you develop small, moderate or high ketones call Diabetes Care at [phone]    Call with any questions.    Thank you,  Enedina Obando RDN, ALFRED, [facility]   Certified Diabetes Care &   [phone]               WDL

## 2023-03-06 NOTE — PROGRESS NOTES
Diabetes Self-Management Education & Support    Type of service:  Video Visit    If the video visit is dropped, the video visit invitation should be resent by: Text to cell phone: [phone]    Originating Location (pt. Location): Home  Distant Location (provider location): Home  Mode of Communication:  Video Conference via [employer]    Video Start Time: 7:57 AM  Video End Time (time video stopped): 8:13 AM    How would patient like to obtain AVS? MyChart    SUBJECTIVE/OBJECTIVE:  Presents for education related to gestational diabetes.    Accompanied by: Self  Diabetes management related comments/concerns: what the expectations are  Gestational weeks: 32w2d  Next OB Visit Date: 03/20/23  Number of previous pregnancies: 0  Had any babies over 9 lbs: No  Previously had Gestational Diabetes: No  Have you ever had thyroid problems or taken thyroid medication?: No  Heart disease, mitral valve prolapse or rheumatic fever?: No  Hypertension : No  High Cholesterol: No  High Triglycerides: No  Do you use tobacco products?: No  Do you drink beer, wine or hard liquor?: No    Cultural Influences/Ethnic Background:  Not  or     LMP 07/15/2022 (Exact Date)       Estimated Date of Delivery: Apr 29, 2023    Blood Glucose/Ketone Log:   DATE Ketones Fasting BG (mg/dL) 1 hour Post Breakfast BG (mg/dL) 1 hour Post Lunch BG (mg/dL) 1 hour post Dinner BG (mg/dL)   3/6 trace 82 - - -   3/5 trace 89 112 119 114   3/4 trace 78 115 124 133   3/3 trace 91 115 87 106   3/2 trace 86 108 101 123   3/1 trace 85 133 92 96   2/28 trace 83 121 85 113   2/27 trace 97 - baby shower night before 102 96 112       Lifestyle and Health Behaviors:  Pre-pregnancy weight (lbs): 176  Exercise:: Yes  Days per week of moderate to strenuous exercise (like a brisk walk): 5  On average, minutes per day of exercise at this level: 50  How intense was your typical exercise? : Heavy (like jogging or swimming  Exercise Minutes per Week: 250  Meal  planning/habits: None  How many times a week on average do you eat food made away from home (restaurant/take-out)?: 1  Meals include: Breakfast, Lunch, Dinner  Breakfast: egg wrap, yogurt, strawberries. OR starbucks egg bites and chocolate croisant OR peanut butter on a wrap with an apple OR cereal  Lunch: salad kits with yogurt and carrots. Leftovers. Carlos Morelos (sub)  Dinner: Every plate meals. Pasta or rice meal with a protein and a vegetables OR couscous OR egg salad  Snacks: sweets, chips or triscuts, cereal  Other: trying to drink 60 ounces a day  Beverages: Water, Coffee, Diet soda (cyrstal light drinks)  Biggest challenges to healthy eating: None  Pre- vitamin?: Yes  Supplements?: Yes  List supplements currently taking: baby aspirin  Experiencing nausea?: No  Experiencing heartburn?: Yes    Healthy Coping:  Emotional response to diabetes: Ready to learn  Informal Support system:: Family  Stage of change: ACTION (Actively working towards change)    Current Management:  Taking medications for gestational diabetes?: No  Difficulty affording diabetes medication?: No  Difficulty affording diabetes testing supplies?: Yes    ASSESSMENT:  Ketones: 100%.   Fasting blood glucoses:86% in target.  After breakfast: 100% in target.  After lunch: 100% in target.  After dinner: 100% in target.    Met with patient today - she is doing well managing DM, still remaining active and monitoring po intake. She had one elevated fasting BG after her baby shower day and eating more sweets. The rest of the BG have been controlled well. Reviewed BG goals, when to check in if BG rise and post partum follow up.   Answered questions.    INTERVENTION:  Educational topics covered today:  What to expect after delivery, Future testing for Type 2 diabetes (2 hour OGTT at 6 week post-partum check-up and annual fasting blood glucose level), Risk of GDM and planning ahead for future pregnancies, Recommended lifestyle interventions for  reducing the risk of Type 2 Diabetes, When to Call a Diabetes Educator or OB Provider    Educational Materials provided today:  [facility] Preventing Diabetes    PLAN:  Check glucose 4 times daily.  Check ketones once a week when readings are consistently negative.  Continue with recommended physical activity.  Continue to follow recommended meal plan: 2 carbs at breakfast, 4 carbs at lunch, 4 carbs at supper, 1-2 carbs at snacks.  Follow consistent CHO meal plan, eat CHO and protein/fat at all meals/snacks.    Call/e-mail/Online [employer] message diabetes educator if 3 or more blood sugars are above the goal in 1 week or if ketones are positive.    Time Spent: 16 minutes  Encounter Type: Individual    Any diabetes medication dose changes were made via the CDE Protocol and Collaborative Practice Agreement with the patient's referring provider. A copy of this encounter was shared with the provider.

## 2023-03-13 ENCOUNTER — MYC MEDICAL ADVICE (OUTPATIENT)
Dept: EDUCATION SERVICES | Facility: CLINIC | Age: 28
End: 2023-03-13
Payer: COMMERCIAL

## 2023-03-13 NOTE — TELEPHONE ENCOUNTER
Gestational Diabetes Follow-up    Subjective/Objective:    Mirina Cordova sent in blood glucose log for review. Last date of communication was: 3/6.    Gestational diabetes is being managed with diet and activity    Taking diabetes medications: no    Estimated Date of Delivery: Apr 29, 2023    BG/Food Log:           Assessment:    Ketones: T.   Fasting blood glucoses: 86% in target.  After breakfast: 86% in target.  Before lunch: x% in target.  After lunch: 100% in target.  Before dinner: x% in target.  After dinner: 100% in target.    Plan/Response:  No changes in the patient's current treatment plan.    Reply:  Zackery Vela,   Looking good :)   Numbers are so healthy right now, way to go!    No need for any adjustments, you are right on track.    If numbers are staying in target, it's ok to wait and send in 2 weeks, the 27th.  But if you do see 3 high readings on a page, please send in 1 week.  Lucas, Hannah Saba RD, ALFRED, CDCALFRED Celestin RD, CDCES  Any diabetes medication dose changes were made via the CDE Protocol and Collaborative Practice Agreement with the patient's OB/GYN provider.

## 2023-03-20 ENCOUNTER — PRENATAL OFFICE VISIT (OUTPATIENT)
Dept: MIDWIFE SERVICES | Facility: CLINIC | Age: 28
End: 2023-03-20
Payer: COMMERCIAL

## 2023-03-20 VITALS
WEIGHT: 194.2 LBS | HEART RATE: 60 BPM | DIASTOLIC BLOOD PRESSURE: 70 MMHG | HEIGHT: 63 IN | BODY MASS INDEX: 34.41 KG/M2 | SYSTOLIC BLOOD PRESSURE: 110 MMHG

## 2023-03-20 DIAGNOSIS — Z34.80 SUPERVISION OF OTHER NORMAL PREGNANCY, ANTEPARTUM: ICD-10-CM

## 2023-03-20 DIAGNOSIS — Z67.91 RH NEGATIVE STATE IN ANTEPARTUM PERIOD: Primary | ICD-10-CM

## 2023-03-20 DIAGNOSIS — O26.899 RH NEGATIVE STATE IN ANTEPARTUM PERIOD: Primary | ICD-10-CM

## 2023-03-20 DIAGNOSIS — O24.410 DIET CONTROLLED GESTATIONAL DIABETES MELLITUS (GDM) IN THIRD TRIMESTER: ICD-10-CM

## 2023-03-20 DIAGNOSIS — O99.810 IMPAIRED GLUCOSE IN PREGNANCY, ANTEPARTUM: ICD-10-CM

## 2023-03-20 PROCEDURE — 99207 PR PRENATAL VISIT: CPT | Performed by: MIDWIFE

## 2023-03-20 PROCEDURE — 96372 THER/PROPH/DIAG INJ SC/IM: CPT | Performed by: MIDWIFE

## 2023-03-20 NOTE — NURSING NOTE
Clinic Administered Medication Documentation    Administrations This Visit     rho(D) immune globulin (RHOGAM) injection 300 mcg     Admin Date  03/20/2023 Action  $Given Dose  300 mcg Route  Intramuscular Site  Left Ventrogluteal Administered By  Saba Morgan MA    Ordering Provider: Marilia Bain CNM    Patient Supplied?: No                  Injectable Medication Documentation    Patient was given Rhogam. Prior to medication administration, verified patients identity using patient s name and date of birth. Please see MAR and medication order for additional information. Patient instructed to remain in clinic for 15 minutes and report any adverse reaction to staff immediately .      Was entire vial of medication used? Yes  Vial/Syringe: Syringe  Expiration Date:  9-  Was this medication supplied by the patient? No     Barbara PARKERN

## 2023-03-20 NOTE — PATIENT INSTRUCTIONS
"\"We hope you had a positive experience and that you can definitely recommend [facility] to your family and friends. You ll be receiving a survey soon and we look forward to hearing your feedback\".    [facility]  - Contact information:  Appointment line and to get a hold of CNM in clinic Monday-Friday 8 am - 5 pm:  [phone].  There are some clinics with early start times (1st appointment 7:40 am) and others with evening hours (last appointment 6:20 pm).  Most are typically open from 8 am to 5 pm.    CNM on call answering service: [phone].  Specify your hospital of choice and leave a brief message for CNM;  will then page CNM who is on call at your specified hospital and you should receive a call back with 15 minutes.  Be sure that your ringer is audible and that you can accept blocked calls so that we can get back in touch with you! This number should be reserved for urgent needs if during the day, before 8 am, after 5 pm, weekends, holidays.    Contact the on-call CNM with warning signs, such as:  vaginal bleeding   Vaginal discharge and itching or pain and burning during urination  Leg/calf pain or swelling on one side  severe abdominal pain  nausea and vomiting more than 4-5 times a day, or if you are unable to keep anything down  fever more than 100.4 degrees F.     [employer]  After each of your visits you are welcome to check [facility] for your visit summary including education and links to information relevant to your pregnancy and/or well woman care.   Find the \"Visits\" tab at the top of the page, you will see a list of recent visits and for each visit a for link for \"View After Visit Summary.\" View of your After Visit Summary will allow you to read our recommendations from your visit, review any education provided, and link to websites with useful information.   If you have any questions or difficulty navigating [facility], please feel free to " "contact us and we will do our best to direct you.  Meet the Midwives from [facility]  You are invited to an informational meet and greet with [facility] Certified Nurse-Midwives. Our free \"Meet the Midwives\" event is a great opportunity to learn about our midwives' philosophy and experience, the hospitals where we can assist with your birth, and answer questions you may have. Partners, friends, and family are welcome to attend. Currently, this is a virtual event.  Date  Last Thursday of every month at 7 pm.    Link to next (live) meeting  [email or web address]  To Join by Telephone (audio only) Call:   [phone] Phone Conference ID: [phone]#    Touring the Maternity Care Center  At this time we are offering a virtual tour of the Maternity Care Centers at both [facility] and [facility]:   [facility] Maternity Care:   [email or web address]:   [email or web address]    Postpartum Depression  The first weeks of caring for a  baby are more than a full-time job. Although it is often a happy time, your feelings and moods may not be what you expected. Many women experience  baby blues.  Here are some tips to help you understand when feelings of sadness are normal, and when you should call your health care provider.    What are the baby blues?  As many as 3 of every 4 women will have short periods of mood swings, crying, or feeling cranky or restless during the first weeks after birth. These feelings can be worse when you are tired or anxious. Women who have the baby blues often say they feel like crying but don t know why. Baby blues usually happen in the first or second week postpartum (after you give birth) and " last less than a week. If you are not sleeping, becoming more upset, don t feel like you can take care of your baby, or your sadness lasts 2 weeks or more, call your health care provider.    What is postpartum depression?  About one in every 5 women will develop depression during the first few months postpartum that may be mild, moderate, or severe. Women who have postpartum depression may have some of these symptoms:  Feeling guilty   Not able to enjoy your baby and feeling like you are not bonding with your baby    Not able to sleep, even when the baby is sleeping  Sleeping too much and feeling too tired to get out of bed  Feeling overwhelmed and not able to do what you need to during the day  Not able to concentrate  Don t feel like eating  Feeling like you are not normal or not yourself anymore  Not able to make decisions  Feeling like a failure as a mother  Feeling lonely or all alone  Thinking your baby might be better off without you  If you have any of these symptoms, call your health care provider!    Which symptoms of postpartum depression are dangerous?  Sometimes a woman with postpartum depression will have thoughts of harming herself or her baby. If you find yourself thinking about hurting yourself or your baby, call your health care provider immediately.    MOTHERHOOD: THE EARLY DAYS  You prepare for the birth of your baby for many months during pregnancy, and then the first months at home after your baby is born can be a quiet, gentle time of getting to know this new person who has come to live in your home. But for most women it is not all quiet or sweet. And for some women it is a very hard time.  What Can I Expect in the First Few Months After the Baby Comes?  New mothers and their families face many challenges in the first few months:  Your body and your hormones have to get back to normal.  You and the baby will be learning to breastfeed.  Babies only sleep a few hours at a time. The entire  family will have a hard time getting enough sleep.  You and your family need to learn how to parent this new family member.  If you have a partner, you have to figure out how to stay together as a couple and maybe even start to have sex again.  You may have to figure out how to keep from getting pregnant again right away.  You may need to return to work and find day care.    How Long Will it Take for My Body to Get Back to Normal?  Some changes will occur quickly. Others will not occur as quickly.  Your uterus, cervix, and vagina will all shrink to their nonpregnant size in about 2 weeks. Your vagina may be tender and dry for a few months--especially if you are breastfeeding.  If you had stitches or hemorrhoids, your   bottom   will be sore for 2 weeks or more.  For some women who have problems urinating, it can take several months for you to be able to hold your urine when you cough or sneeze or suddenly  something heavy.  Your breast milk will   come in   2 to 3 days after the birth of your baby. It will take 6 to 8 weeks for you and the baby to get the hang of breastfeeding and find a pattern. During these first weeks, you can have engorged breasts at times and often leak milk.  Your stomach and intestines all have to fall back into place. You may have a lot of gas for a few weeks.  You may be constipated--especially if you are breastfeeding.  Your stretched stomach muscles can recover in a few weeks, but for some women it takes longer--6 months or a year--to recover.  If you had a  delivery, you may have pain or numbness around the incision for 6 months or more.  Losing the weight you gained during pregnancy will probably take 6 months to a year. Have patience! It took 40 weeks to get here. Give yourself 40 weeks to get back.    What Can I Expect When My Hormones Change?  About 75% of all women will get the   blues.   This usually starts about 3 days after the birth of your baby. You may cry  easily and feel very, very tired. A few women become very depressed. If you had a  delivery or your new baby was sick, you are at a higher risk for depression.  Call your health care provider right away if you cannot care for yourself or your baby, if you feel very nervous or worried, if you cannot stop crying, or if you are having thoughts of hurting yourself or your baby.    Taking Care of Yourself  While you are still pregnant:  Talk with your partner and your family about the time ahead. Arrange for someone to help you during the first weeks at home if you can.  Talk with your health care provider about birth control options and make a plan before the baby comes.  If you are worried about how to parent a , take parenting classes. You will learn a lot about how babies act and you will make some friends who are going through the same thing at the same time. Most [facility] have these classes.  Arrange for someone to help with baby care if you can.  After the baby comes:  Ask for help. Let other people do the cooking and cleaning and run the house. Focus on yourself and your baby.  Sleep whenever you can. Try not to be tempted to   get some things done   when the baby sleeps. This is your time to sleep, too.  Drink lots of water. You will need at least 6 big glasses of water everyday to avoid constipation and make enough breast milk. Every time you sit down to breastfeed, have a big glass of water with you to drink while you are nursing.  Eat lots of vegetables and fruit. You will need lots of vitamins and fiber to help your body get back to normal. This will also help you avoid constipation.  Go outside and walk. Babies can go outside even if it is very cold. Fresh air and sunshine will do you both good.  Take sitz baths. Put about 6 inches of warm water in your bathtub and sit in there for 15 minutes 2 to 3 times a day. This will help your   bottom   heal more quickly. It will also give you 15  minutes of private time!  Talk to other mothers. Join a new parents group. Call [place] and go to [facility] meetings if you are breastfeeding.     With your partner:  Keep talking. Share the experience.  Spend time alone. Even a 30-minute walk can be a date.  Start a birth control method. You can get pregnant before you even have a period. It is very important to use birth control if you do not want to get pregnant again right away.  When you have sex, use a lubricant. A lot of lubricant! Take it slow.  The first few months after a baby comes can be a lot like floating in a jar of honey--very sweet and landis, but very sticky, too. Take time to enjoy the good parts. Remind yourself that this time will pass. Bon voyage!    FOR MORE INFORMATION  For questions about depression during and after pregnancy:  http://www.womenshealth.gov/publications/our-publications/fact-sheet/depression-pregnancy.html   After birth: The first 6 weeks:  http://www.[employer]Post-Birth-and-Recovery   Breastfeeding resources:  http://www.[employer].org/health-info/getting-breastfeeding-off-to-a-good-start/    Preparing for your baby:       Car Seat Clinics:  https://dps.[place].gov/divisions/ots/child-passenger-safety/Pages/car-seat-checks.aspx  [facility]    Free Car Seat Distribution Facilities     By Appt. Address Contact Information (For appointment)      \Yes [employer]\[address]\[place],\[place] Madhavi Bucio)[email or web address]      Yes [facility]\ [facility]\[place],\[place] Awa Og)[email or web address]      Yes [facility]\[address]\[place],\[place] Nicole Banks)[email or web address]      Immunizations:  http://www.cdc.gov/vaccines/schedules/easy-to-read/child.html     Screening Program  Http://www.health.[facility].mn.us/newbornscreening/  [place] newborns are tested soon  after birth for more than 50 hidden, rare disorders, including hearing loss and critical congenital heart disease (CCHD). This site provides resources and information for families and providers.    When to call:   Appointment line and to get a hold of a midwife in clinic Monday-Friday 8 am - 5 pm:  [phone].  There are some clinics with early start times (1st appointment 7:40 am) and others with evening hours (last appointment 6:20 pm).  Most are typically open from 8 am to 5 pm.    CNM on call answering service: [phone].  Specify your hospital of choice and leave a brief message for a midwife;  will then page a midwife who is on call at your specified hospital and you should receive a call back with 15 minutes.  Be sure that your ringer is audible and that you can accept blocked calls so that we can get back in touch with you! This number should be reserved for urgent needs if during the day, before 8 am, after 5 pm, weekends, holidays.    Contact the on-call CNM with warning signs, such as:  vaginal bleeding   Vaginal discharge and itching or pain and burning during urination  Leg/calf pain or swelling on one side  severe abdominal pain  nausea and vomiting more than 4-5 times a day, or if you are unable to keep anything down  fever more than 100.4 degrees F.     Make plans for transportation and  as needed for when you are going to the hospital.    Ask your health care provider about vaccinations you may need following delivery. By now, you should have received a Tdap immunization to protect against pertussis or whooping cough. Fathers and family members who will be in close contact with the baby should also receive a Tdap shot at least two weeks before the expected birth of the baby if they have not had a Td (tetanus) shot for at least two years.    Your midwife may offer to check your cervix for changes. If you are past your due date, discuss the next steps leading to delivery  with your midwife. If you don't start labor on your own by 41 or 42 weeks, your midwife may recommend giving you medicines to ripen your cervix and start labor.  Induction of labor: http://onlinelibrary.dubois.com/store/10.1016/j.jmwh.2008.04.018/asset/j.jmwh.2008.04.018.pdf?v=1&t=fvnd9eul&m=[email or web address]    Tell your midwife or physician how you plan to feed your baby (breast or bottle), who you have chosen to do pediatric care for your baby, and if you have a boy, whether you have chosen to have him circumcised. You will need a car seat correctly installed in your vehicle to bring your baby home. As you start to set up the nursery at home for your baby, make sure the crib is safe. The mattress needs to fit snugly against the edges of the crib. If you can fit a soda can between the bars, they are too far apart and can allow the baby's head to caught between them.    Learn about infant care and feeding, including information about infant CPR. We recommend that you put your baby to sleep on his or her back to reduce the chance of Sudden Infant Death Syndrome (SIDS). To maintain a healthy environment in which your child can grow, it's best to keep your home smoke-free. By preparing ahead, your transition into parenthood will go smoothly for you and your baby.    Your midwife will want to see you for a checkup 2 to 6 weeks after delivery.      Making Plans for Feeding My Baby    By this point, you probably have read a lot about feeding your baby.  Breastfeed or formula? Each mother s decision is her own and [facility] respects you and your choices. We ve gathered information on both breastfeeding and formula feeding to help with your decision. Talking with your physician or nurse-midwife can also help in your decision.  However you plan to feed your baby, [facility] Maternity Care Centers encourage rooming in with your baby, skin-to-skin contact and feeding your baby based on his or her  cues.    Skin-to-skin contact  Being close to mom helps your baby adjust to life outside of the womb.  It helps your baby regulate their body temperature, heart rate, and breathing.  Your baby will usually be placed skin-to-skin immediately following birth or as soon as possible, if medical intervention is needed.    Rooming-In  Having your baby stay with you in your room is called  rooming-in .  Keeping your baby in your room helps you to learn how to care for your baby by getting to know your baby s cues, body rhythms and sleep cycle.       Cue-based feeding  Cues (signals) are baby s way of telling you what he or she wants.  When you learn your infant s cues, you know how to care for and feed your baby.   Feeding cues are the licking and smacking of lips, bringing their fist to their mouth, and a reflex called  rooting - where baby turns and opens his or her mouth, searching for the breast or bottle.  Crying is a late feeding cue.  Babies can feed frequently, often at least 8 times in 24 hours.  Breastfeeding facts  Breast milk is the best source of nutrition for your baby and is available at birth. In the first couple of days, your milk volume is already starting to increase, though it may not be noticeable. Breastfeed frequently to increase your milk supply. Within three to five days, you will begin to notice larger milk volumes. An increase in breast size, heaviness and firmness are often described as the milk  coming in.  Frequent breastfeeding can help breasts from getting overly firm and painful. You will know the baby is getting enough milk if your baby is having wet and dirty diapers and gaining weight.     If your goal is to exclusively breastfeed, it is important to not use any formula or artificial nipples (including bottles and pacifiers) while your baby is learning to breastfeed.  While it may seem like an  easy  option to give your baby a bottle, formula should only be given if there is a medical  reason for your baby to have it.    Positioning and attachment   Get comfortable.  Use pillows as needed to support your arms and baby.  Hold baby close at the level of your breast, facing you in a tummy to tummy position.  Skin to skin helps with this.  Position the baby with his or her nose by the nipple.  There should be a straight line from baby s ear to shoulder to hips.  Tickle your baby s lips or wait for baby to open mouth wide, bring baby to breast by leading with the chin.  Aim the nipple at the roof of baby s mouth.  A rapid sucking pattern is followed by longer, drawing pattern with occasional swallows heard.  When baby is correctly latched, your nipple and much of the areola are pulled well into baby s mouth.      Returning to work or school  Focus on a good start to breastfeeding.  Many women continue to provide breastmilk for their baby when they return to work or school.  Making plans about where to pump and store milk can make the transition go well.  Talk with other mothers who have also returned to work or school for tips and support.  Your employer s Human Resource department may be a resource as well.     Returning to work or school: (continued)   babies can mean fewer  sick  days for you.  A quality breast pump will also save time and add comfort.  Check with your insurance prior to giving birth for breast pump coverage.  Many insurance companies include a pump within your benefits.  Wait until your baby is at least three weeks old to introduce a bottle for the first time.  Have someone besides you give the bottle.  Breastfeed when you are with your baby. Reserve your bottles of breast milk for when you are away.   Your breasts will need to be  emptied  either by your baby or a pump.  Plan to pump at least twice in an eight hour day.  If you cannot pump at work, continue breastfeeding at home. Any amount of breast milk is worth giving to your baby.    Formula feeding facts  If you are  planning to use formula to feed your baby, you will want to make some preparations ahead of time. Talk to your doctor or nurse-midwife about what type of formula to use. Some are iron-fortified, meaning they have extra iron in them. You will want to purchase formula and bottles before your baby is born to be sure you are ready after you return from the hospital. The [facility] do not provide formula samples to take home.    Be sure to follow formula mixing directions closely. Regular milk in the dairy case at the grocery store should not be given to babies under 1 year old. Baby formula is sold in several forms including:  Ready-to-use. This is the most expensive, but no mixing is necessary.  Concentrated liquid. This is less expensive than ready-to-use and you mix with water.  Powder. This is the least expensive. You mix one level scoop of powdered formula with two ounces of water and stir well.    Most babies need 2.5 ounces of formula per pound of body weight each day. This means an 8-pound baby may drink about 20 ounces of formula a day; however, this is just an estimate. The most important thing is to pay attention to your baby s cues.  If your baby is always fussy, needs more iron or has certain food allergies, your physician may suggest you change your baby s formula to a different kind.     How do I warm my baby s bottles?  You may feed your baby a bottle without warming it first. It is OK for the breast milk or formula to be cool or room temperature. If your baby seems to prefer it warmed, you can put the filled bottle in a container of warm water and let it stand for a few minutes. Check the temperature of the liquid on your skin before feeding it to your baby; to be sure it isn t too hot. Do not heat bottles in the microwave. Microwaves heat food and liquids unevenly, and this can cause hot spots that can burn your baby.    How do I clean and sterilize bottles?  Sterilize bottles and nipples  before you use them for the first time. You can do this by putting them in boiling water for 5 minutes. After that first time, you can wash them in hot and soapy water. Rinse them carefully to be sure there is no soap left on them. You can also wash them in the .      Am I in Labor?  What is labor? Labor is the work that your body does to birth your baby. Your uterus (the womb) contracts(tightens). The contractions(labor pains) push your baby down onto your cervix(the opening ofyour uterus). Thispressure causesyour cervix to open. When your cervix iscompletely open (10 centimeters dilated), you will push your baby through your vagina and out into the world.  What do contractions feel like? When contractionsfirst start, theyusually feellike cramps duringyour period. Sometimesyoufeelpain in your back. Mostoften,contractions feel like muscles pulling painfully in your lower belly. At first, the contractions will probably be 15 to 20 minutes apart.They maybe irregular and will not feel too painful. As labor goes on, the contractionsget stronger,closer together, more consistent, and more painful.  How do I time the contractions? When the contractionsseem to be coming regularly, youshould start to time them.You time your contractions by counting the number of minutes from the start of one contraction to the start of the next contraction.  What should I do during early labor when the contractions start? If it is night andyoucan sleep, do so. If it happensduring the day, there are some things you can do to take care of yourself at home: Walk. If the painsyou are having are reallabor, walking will makethecontractionscome closer together and they will be stronger,but you will be able to cope with them better if you are standing or moving around. If the contractions are early labor ones that come andgo (sometimes called false labor), walkingcan make them go away. Take a shower or bath. This will help you relax. Eat.  Labor is a big event.Your body needs a lot of energy to be effective.Eat whatever you feel like eating. Drink water. Not drinking enough water can cause contractions to not be as effectiveas theyshould be.You need to be well hydrated (drinking enoughwater) to help your body work well during labor. Take a na p. If youfeel tired, lay down on your side and get all the rest you can. It helps to be rested when you go intoactive labor. Do something you enjoy. Spend time with family. Watch a movie. Distraction will help you relax. Get a massage. If your labor is in your back, a strong massage on your lower back may feel very good. Getting a foot massage or having a partner rub your feet can also be very relaxing. Don t panic. You can do this. Your body was made for this. You are strong!  When should I call my health care provider if I think I am in labor? Your contractions have been 5 minutes or less apart for at least an hour. Your contractions are becoming so painful youcannot walk or talk during one. You think your amniotic sac (bag of sandra) breaks. You may have a big gush of amniotic fluid (water) or just fluid that runs down your legs when you walk or move or change position.  Are there other reasons to call my health care provider? If you are concerned about anything, don t hesitate to call your health care provider.You should definitely call your health care provider or go to the hospital if:  It is 3 weeks or more before your due date, and you are having contractions.  You have vaginal bleeding that is more than your period, soaks your underwear, or runs down your legs.  You have sudden severe pain that does not go away with rest.  Your baby has not movedfor several hours.  You are leaking greenish fluid.    For More Information: http://onlinelibrary.dubois.com/doi/10.1111/jmwh.76655/epdf     US Department of Health and Human Services: Signs oflabor,labor stages, and types of  birth  http://womenshealth.gov/pregnancy/childbirth-beyond/labor-birth.html#a      Childbirth and Parenting Education:       Everyday Miracles:   https://www.everyday-miracles.org/    Free Video Series from [facility]: https://nursing.[facility]/academics/specialty-areas/nurse-midwifery/having-baby-prenatal-videos/having-baby-prenatal-and    MIKE parenting center: http://[facility] HospitalMan[facility] [place]/   (694) 454-BABY  [place]: (education, yoga & wellness) www.ToutAppa3DMGAME  Enlightened Mama: www.enlightenedmama.Aceable   Childbirth collective: (Parent topic nights)  www.childbirthcollective.org/  Hypnobabies:  www.hypnobabiestwincities.Aceable/  Hypnobirthing:  Http://hypnobirthing.Aceable/  The Birth Hour: https://Inspiris/online-childbirth-class/    APPS and Podcasts:   Ryne Adams Nurture    Evidence Based Birth  The Birth Hour (for birth stories)   Birthful   Expectful   The Longest Shortest Time  PregnancyPodcast Yelitza Person    Book Recommendations:   Patsy [place]'s Birthing From Within--first few chapters include a new-age tone, you may prefer to skip it and keep going, because there is good stuff later.  This book recommendation covers emotional preparation, but does cover coping with pain, and use of both pharmacological and nonpharmacological methods.    Dr. Evans' The Pregnancy Book and The Birth Book--the pregnancy book goes month-by month      The Birth Partner by Sushma Rocha    Womanly Art of Breastfeeding by La Leche League International   Bestfeeding by Hannah Smiley--great pictures    Mothering Your Nursing Toddler, by Eloise Momin.   Addresses dealing with so many of the challenging behaviors of a nursing toddler.  How Weaning Happens, by La Leche League.  Discusses weaning at all ages, from medically necessary weaning of an infant, all the way up to age 5 (or older), with why/why not, and strategies.  Very empowering book both for deciding to wean and deciding not to.    American College of  "Nurse-Midwives (ACNM) http://www.midwife.org/; look at the informational handouts at http://www.midwife.org/Share-With-Women     www.mymidwife.org    Mother to Baby (Medication and Herbal guidance in pregnancy): http://www.mothertobaby.org  Toll-Free Hotline: 617.369.5817  LactMed (Medication use while breastfeeding): http://toxnet.nlm.nih.gov/newtoxnet/lactmed.htm    Women's Health.gov:  http://www.womenshealth.gov/a-z-topics/index.html    American pregnancy association - http://americanpregnancy.org    Centering Pregnancy (group prenatal care option): http://centeringhealthcare.org    Information about doulas:  Childbirth collective: http://www.childbirthcollective.org/  Doulas of [place] (EFRA):  www.efra.org  [facility]  project: http://Tamaraccitiesdoulaproject.com/     Early Childhood and Family Education (ECFE):  ECFE offers parents hands-on learning experiences that will nourish a lifetime of teachable moments.  http://ecfe.info/ecfe-home/    March of Dimes www.DentalFran Mid-Atlantic Partnership.Connect2me     FDA - Nutrition  www.mypyramid.gov  Under \"For Consumers,\" click on \"pregnant and breastfeeding women.\"      Centers for Disease Control and Prevention (CDC) - Vaccines : http://www.cdc.gov/vaccines/       When researching information on the web, question the validity of websites.  The domains .gov, .edu and.org tend to be more reliable information.  If there are a lot of advertisements, be cautious of the information provided. Stay away from blogs and chat rooms please!     Pending sale to [facility] Breastfeeding Support    Early Childhood Family Education [place] [address] provides a free, drop-in class/breastfeeding support group, facilitated by a lactation consultant and .  During the group you can connect with other parents, weigh your baby, ask questions about feeding and baby development, and more.  You do not need to register.  Fall in-person meetings will begin on September 12, are for parents of babies from birth to 9 " "months, and will meet on Monday evenings from 6 - 7:15 pm in [facility], which is at [address] [address] [facility].  [place] [address] also offers virtual group meetings with a lactation consultant/.  These take place on , from 11:30 am - 12:30 pm for parents of newborns to 3-month-olds, and from 4:15 - 5:15 for parents of 3 - 9 - month olds.  To get the meeting link contact Yen Stone at [phone].    [facility] offers a free, drop-in breastfeeding support group facilitated by CHRISTIE Pete.   at [place] [facility] [address], [place].  A scale is available to check baby weights, if desired.  [place] also has a variety of new parent classes:  (cost for registration)  https://MISSION Therapeutics/classes/    [facility] Lactation Lounge facilitated by CHRISTIE Grimm:  Free, drop-in support group for breastfeeding, with baby scale available if needed.  Meets at [facility], second Tuesday of each month, 10 am - 12 pm.  Text [phone] for info.    [facility] [facility] Support Group,  at 10:30 am   Run by CHRISTIE Chaney of [employer] [facility]act[facility]   Go to [facility] Facebook page, click on \"events\" for link:   Https://www.Quest Online.com/events/327836512260656/    [facility] [place] breastfeeding support community:  free, drop-in breastfeeding support facilitated by Certified Lactation Counselors, open  and  from 1 pm - 5 pm.  In [place]:  [facility] [place], [address] [facility]:   guidingstjenarokota.org    [facility] Milk Hour,  at 2:30 pm    Run by CHRISTIE Treviño  Go to [facility] FB page and send message to get link   Https://www.Quest Online.com/healthfoundations/    Manish Bryant:  http://www.[facility] Hospitalndas.org/    [facility] offers a Lactation Lounge every Friday 12pm - 1pm, run by Marianne" "Manish Holder Leader.  Sign up via link at Plored/cbe-lactation   https://www.Plored/cbe-lactation    [facility] is offering virtual support groups every Monday, 10:30 am - 12 pm, run by RN IBCLC: Https://www.LittleLives.com/events/349251757417228/    Culturally-Specific Breastfeeding Support:     For Hmong Families:   The Hmong Breastfeeding Coalition is a wonderful support for [place] Hmong women who are breastfeeding.  They are best found on Facebook.    for Black families:    Chocolate Milk Club:  http://www.FleetMatics/chocolate-milk-club/  Email: Marcelino@Postachio    R.O.S.E. = Reaching our Sisters Everywhere  Http://www.breastfeedingrose.org/    Club Mom breastfeeding support for Black mothers:  Contact Hoda Brady  Phone: [phone]   Email:  Teresa@[facility].     Alta Morley  Phone: [phone]   Email:  Dara@[facility].    Club Dad parent support for Black fathers:   Contact Fabián Lr   Phone: [phone]   Email:  Jack@[facility]    For Native/Indigenous Families:    https://www.LittleLives.com/groups/nitamising.gimashkikinaan   Virtual Breastfeeding Support:    During this time of isolation, breastfeeding families need even more community!  Here are some area organizations offering virtual support groups for breastfeeding:    [facility] Support Group, Tuesdays at 10:30 am   Run by CHRISTIE Chaney of [employer] [facility]   Go to [facility] Facebook page and click on \"events\" for link   https://www.facebook.com/events/832657518937997/  [facility] Milk Hour, Thursdays at 2:30 pm    Run by CHRISTIE Treviño   Go to [facility] FB page and send message to get link   https://www.LittleLives.com/healthfoundations/  [facility]Leche [facility] holding virtual " meetings the first Tuesday of each month, 8-9 pm, and the   Third Saturday, 10 - 11 am.  Go to La Leche League of [place] and [place] FB page; message to get link https://www.Bionanoplus.com/LLDanettefGClair/?hc_location=ufi  [facility] offers a Lactation Lounge every Friday 12pm - 1pm, run by Manish Hill Leader   Sign up via link at InvestGlass/cbe-lactation   https://www.InvestGlass/cbe-lactation  [facility] is offering virtual support groups every Monday, 10:30 am - 12 pm, run by nurse CHRISTIE   Https://www.facebook.com/events/933942827931826/    Prenatal Breastfeeding Classes:      [facility] is offering virtual breastfeeding and  care classes:  https://www.InvestGlass/education-workshops  BirthEd childbirth and breastfeeding education offering virtual prenatal breastfeeding classes  https://www.birthedmn.com/workshops

## 2023-03-20 NOTE — PROGRESS NOTES
Mirian is here by herself today for a routine prenatal visit at 34w2d gestation Baby is active. Her blood glucose log shared today, most WDL. Missed a few when she fell asleep after dinner and had two elevated in last week attributed to diet change. Has remained active with 3lb weight loss since last visit. Feeling well. Denies contractions but has felt some low pressure with activity so we discussed BH contractions and S/S of PTL and what to watch for with when to call prn. RTC 2 weeks. Continues to abstain from marijuana use in pregnancy. Has baby shower planned this weekend. Rhogam given today. Has been 12 weeks since last dose

## 2023-03-27 ENCOUNTER — MYC MEDICAL ADVICE (OUTPATIENT)
Dept: EDUCATION SERVICES | Facility: CLINIC | Age: 28
End: 2023-03-27
Payer: COMMERCIAL

## 2023-03-27 NOTE — TELEPHONE ENCOUNTER
Gestational Diabetes Follow-up    Subjective/Objective:    Mirian Cordova sent in blood glucose log for review. Last date of communication was: 3/13/23.    Gestational diabetes is being managed with diet and activity    Taking diabetes medications: no    Estimated Date of Delivery: Apr 29, 2023, 35w2d    BG/Food Log:   Mon 3/13 94 92 118 118  Tue 3/14 87 X 107 X (forgot and then fell asleep)  Wed 3/15 85 124 92 116  Thur 3/16 91 125 83 102  Fri 3/17 84 110 157* X (work catered [employer] for lunch and then I fell asleep after dinner)  Sat 3/18 86 117 109 92  Sun 3/19 86 116 101 112     Mon 3/20 87 90 90 168* (Brady s and free cone day at [employer])  Tues 3/21 86 112 95 90  Wed 3/22 86 111 105 117  Thur 3/23 89 96 X 111 (work meeting)  Fri 3/24 86 130 98 97  Sat 3/25 86 124 111 141  Sun 3/26 86 91 100 X (fell asleep)      Assessment:  Blood sugars in target with food plan.  Elevated readings due to eating out meals.  No consistent plan of elevation.     Ketones: trace-small.   Fasting blood glucoses: 100% in target.  After breakfast: 100% in target.  Before lunch: -% in target.  After lunch: 92% in target.  Before dinner: -% in target.  After dinner: 86% in target.    Plan/Response:  No changes in the patient's current treatment plan.  Follow-up in 2 weeks.    Cristina Castellano MS, RD, LD, CDE      Any diabetes medication dose changes were made via the CDE Protocol and Collaborative Practice Agreement with the patient's OB/GYN provider. A copy of this encounter was shared with the provider.

## 2023-04-03 ENCOUNTER — PRENATAL OFFICE VISIT (OUTPATIENT)
Dept: MIDWIFE SERVICES | Facility: CLINIC | Age: 28
End: 2023-04-03
Payer: COMMERCIAL

## 2023-04-03 VITALS
HEART RATE: 76 BPM | BODY MASS INDEX: 34.99 KG/M2 | DIASTOLIC BLOOD PRESSURE: 70 MMHG | HEIGHT: 63 IN | WEIGHT: 197.5 LBS | SYSTOLIC BLOOD PRESSURE: 106 MMHG

## 2023-04-03 DIAGNOSIS — Z34.93 THIRD TRIMESTER PREGNANCY: Primary | ICD-10-CM

## 2023-04-03 DIAGNOSIS — O24.410 DIET CONTROLLED GESTATIONAL DIABETES MELLITUS (GDM) IN THIRD TRIMESTER: ICD-10-CM

## 2023-04-03 LAB
HGB BLD-MCNC: 12.9 G/DL (ref 11.7–15.7)
HOLD SPECIMEN: NORMAL

## 2023-04-03 PROCEDURE — 99207 PR PRENATAL VISIT: CPT | Performed by: ADVANCED PRACTICE MIDWIFE

## 2023-04-03 PROCEDURE — 85018 HEMOGLOBIN: CPT | Performed by: ADVANCED PRACTICE MIDWIFE

## 2023-04-03 PROCEDURE — 36415 COLL VENOUS BLD VENIPUNCTURE: CPT | Performed by: ADVANCED PRACTICE MIDWIFE

## 2023-04-03 PROCEDURE — 87653 STREP B DNA AMP PROBE: CPT | Performed by: ADVANCED PRACTICE MIDWIFE

## 2023-04-03 NOTE — PROGRESS NOTES
Mirian is  here with Dewayne for [facility] visit at 36w2d  Feeling well, active FM. Denies questions/concerns.     CBE: completed Raquel class    GDM logs reviewed, 2 elevated pp (130s), all fasting WNL.   TW.352 kg (11 lb 12.8 oz)     GBS and hgb today    Cervical exam: Closed, posterior, cephalic.     Labor s/sx reviewed, how to reach CNM on call. Weekly visits until birth.

## 2023-04-03 NOTE — PATIENT INSTRUCTIONS
"\"We hope you had a positive experience and that you can definitely recommend [facility] to your family and friends. You ll be receiving a survey soon and we look forward to hearing your feedback\".    [facility]  - Contact information:  Appointment line and to get a hold of CNM in clinic Monday-Friday 8 am - 5 pm:  [phone].  There are some clinics with early start times (1st appointment 7:40 am) and others with evening hours (last appointment 6:20 pm).  Most are typically open from 8 am to 5 pm.    CNM on call answering service: [phone].  Specify your hospital of choice and leave a brief message for CNM;  will then page CNM who is on call at your specified hospital and you should receive a call back with 15 minutes.  Be sure that your ringer is audible and that you can accept blocked calls so that we can get back in touch with you! This number should be reserved for urgent needs if during the day, before 8 am, after 5 pm, weekends, holidays.    Contact the on-call CNM with warning signs, such as:  vaginal bleeding   Vaginal discharge and itching or pain and burning during urination  Leg/calf pain or swelling on one side  severe abdominal pain  nausea and vomiting more than 4-5 times a day, or if you are unable to keep anything down  fever more than 100.4 degrees F.     [employer]  After each of your visits you are welcome to check [employer] for your visit summary including education and links to information relevant to your pregnancy and/or well woman care.   Find the \"Visits\" tab at the top of the page, you will see a list of recent visits and for each visit a for link for \"View After Visit Summary.\" View of your After Visit Summary will allow you to read our recommendations from your visit, review any education provided, and link to websites with useful information.   If you have any questions or difficulty navigating mcTEL, please feel free to " "contact us and we will do our best to direct you.  Meet the Midwives from [facility]  You are invited to an informational meet and greet with [facility] Certified Nurse-Midwives. Our free \"Meet the Midwives\" event is a great opportunity to learn about our midwives' philosophy and experience, the hospitals where we can assist with your birth, and answer questions you may have. Partners, friends, and family are welcome to attend. Currently, this is a virtual event.  Date  Last Thursday of every month at 7 pm.    Link to next (live) meeting  [email or web address]  To Join by Telephone (audio only) Call:   [phone] Phone Conference ID: [id number]#    Touring the Maternity Care Center  At this time we are offering a virtual tour of the Maternity Care Centers at both [facility] and [facility]:   [facility] Maternity Care:   [email or web address]:   [email or web address]    Postpartum Depression  The first weeks of caring for a  baby are more than a full-time job. Although it is often a happy time, your feelings and moods may not be what you expected. Many women experience  baby blues.  Here are some tips to help you understand when feelings of sadness are normal, and when you should call your health care provider.    What are the baby blues?  As many as 3 of every 4 women will have short periods of mood swings, crying, or feeling cranky or restless during the first weeks after birth. These feelings can be worse when you are tired or anxious. Women who have the baby blues often say they feel like crying but don t know why. Baby blues usually happen in the first or second week postpartum (after you give birth) and " last less than a week. If you are not sleeping, becoming more upset, don t feel like you can take care of your baby, or your sadness lasts 2 weeks or more, call your health care provider.    What is postpartum depression?  About one in every 5 women will develop depression during the first few months postpartum that may be mild, moderate, or severe. Women who have postpartum depression may have some of these symptoms:  Feeling guilty   Not able to enjoy your baby and feeling like you are not bonding with your baby    Not able to sleep, even when the baby is sleeping  Sleeping too much and feeling too tired to get out of bed  Feeling overwhelmed and not able to do what you need to during the day  Not able to concentrate  Don t feel like eating  Feeling like you are not normal or not yourself anymore  Not able to make decisions  Feeling like a failure as a mother  Feeling lonely or all alone  Thinking your baby might be better off without you  If you have any of these symptoms, call your health care provider!    Which symptoms of postpartum depression are dangerous?  Sometimes a woman with postpartum depression will have thoughts of harming herself or her baby. If you find yourself thinking about hurting yourself or your baby, call your health care provider immediately.    MOTHERHOOD: THE EARLY DAYS  You prepare for the birth of your baby for many months during pregnancy, and then the first months at home after your baby is born can be a quiet, gentle time of getting to know this new person who has come to live in your home. But for most women it is not all quiet or sweet. And for some women it is a very hard time.  What Can I Expect in the First Few Months After the Baby Comes?  New mothers and their families face many challenges in the first few months:  Your body and your hormones have to get back to normal.  You and the baby will be learning to breastfeed.  Babies only sleep a few hours at a time. The entire  family will have a hard time getting enough sleep.  You and your family need to learn how to parent this new family member.  If you have a partner, you have to figure out how to stay together as a couple and maybe even start to have sex again.  You may have to figure out how to keep from getting pregnant again right away.  You may need to return to work and find day care.    How Long Will it Take for My Body to Get Back to Normal?  Some changes will occur quickly. Others will not occur as quickly.  Your uterus, cervix, and vagina will all shrink to their nonpregnant size in about 2 weeks. Your vagina may be tender and dry for a few months--especially if you are breastfeeding.  If you had stitches or hemorrhoids, your   bottom   will be sore for 2 weeks or more.  For some women who have problems urinating, it can take several months for you to be able to hold your urine when you cough or sneeze or suddenly  something heavy.  Your breast milk will   come in   2 to 3 days after the birth of your baby. It will take 6 to 8 weeks for you and the baby to get the hang of breastfeeding and find a pattern. During these first weeks, you can have engorged breasts at times and often leak milk.  Your stomach and intestines all have to fall back into place. You may have a lot of gas for a few weeks.  You may be constipated--especially if you are breastfeeding.  Your stretched stomach muscles can recover in a few weeks, but for some women it takes longer--6 months or a year--to recover.  If you had a  delivery, you may have pain or numbness around the incision for 6 months or more.  Losing the weight you gained during pregnancy will probably take 6 months to a year. Have patience! It took 40 weeks to get here. Give yourself 40 weeks to get back.    What Can I Expect When My Hormones Change?  About 75% of all women will get the   blues.   This usually starts about 3 days after the birth of your baby. You may cry  easily and feel very, very tired. A few women become very depressed. If you had a  delivery or your new baby was sick, you are at a higher risk for depression.  Call your health care provider right away if you cannot care for yourself or your baby, if you feel very nervous or worried, if you cannot stop crying, or if you are having thoughts of hurting yourself or your baby.    Taking Care of Yourself  While you are still pregnant:  Talk with your partner and your family about the time ahead. Arrange for someone to help you during the first weeks at home if you can.  Talk with your health care provider about birth control options and make a plan before the baby comes.  If you are worried about how to parent a , take parenting classes. You will learn a lot about how babies act and you will make some friends who are going through the same thing at the same time. Most [facility] have these classes.  Arrange for someone to help with baby care if you can.  After the baby comes:  Ask for help. Let other people do the cooking and cleaning and run the house. Focus on yourself and your baby.  Sleep whenever you can. Try not to be tempted to   get some things done   when the baby sleeps. This is your time to sleep, too.  Drink lots of water. You will need at least 6 big glasses of water everyday to avoid constipation and make enough breast milk. Every time you sit down to breastfeed, have a big glass of water with you to drink while you are nursing.  Eat lots of vegetables and fruit. You will need lots of vitamins and fiber to help your body get back to normal. This will also help you avoid constipation.  Go outside and walk. Babies can go outside even if it is very cold. Fresh air and sunshine will do you both good.  Take sitz baths. Put about 6 inches of warm water in your bathtub and sit in there for 15 minutes 2 to 3 times a day. This will help your   bottom   heal more quickly. It will also give you 15  minutes of private time!  Talk to other mothers. Join a new parents group. Call [facility] and go to [facility] meetings if you are breastfeeding.     With your partner:  Keep talking. Share the experience.  Spend time alone. Even a 30-minute walk can be a date.  Start a birth control method. You can get pregnant before you even have a period. It is very important to use birth control if you do not want to get pregnant again right away.  When you have sex, use a lubricant. A lot of lubricant! Take it slow.  The first few months after a baby comes can be a lot like floating in a jar of honey--very sweet and landis, but very sticky, too. Take time to enjoy the good parts. Remind yourself that this time will pass. Bon voyage!    FOR MORE INFORMATION  For questions about depression during and after pregnancy:  http://www.womenshealth.gov/publications/our-publications/fact-sheet/depression-pregnancy.html   After birth: The first 6 weeks:  [email or web address]   Breastfeeding resources:  http://www.[employer].org/health-info/getting-breastfeeding-off-to-a-good-start/    Preparing for your baby:       Car Seat Clinics:  https://[place]ps.[place].gov/divisions/ots/child-passenger-safety/Pages/car-seat-checks.aspx  [facility]    Free Car Seat Distribution Facilities     By Appt. Address Contact Information (For appointment)      \Yes [employer]\[address]\[place],\cell Madhavi Bucio)[email or web address]      Yes [facility]\[place],\cell Awa Og)[email or web address]      Yes [facility]\[address]\[place],\cell Nicole Banks)[email or web address]      Immunizations:  http://www.cdc.gov/vaccines/schedules/easy-to-read/child.html     Screening Program  Http://www.health.[facility].mn.us/newbornscreening/  [place] newborns are tested soon  after birth for more than 50 hidden, rare disorders, including hearing loss and critical congenital heart disease (CCHD). This site provides resources and information for families and providers.    When to call:   Appointment line and to get a hold of a midwife in clinic Monday-Friday 8 am - 5 pm:  [phone].  There are some clinics with early start times (1st appointment 7:40 am) and others with evening hours (last appointment 6:20 pm).  Most are typically open from 8 am to 5 pm.    CNM on call answering service: [phone].  Specify your hospital of choice and leave a brief message for a midwife;  will then page a midwife who is on call at your specified hospital and you should receive a call back with 15 minutes.  Be sure that your ringer is audible and that you can accept blocked calls so that we can get back in touch with you! This number should be reserved for urgent needs if during the day, before 8 am, after 5 pm, weekends, holidays.    Contact the on-call CNM with warning signs, such as:  vaginal bleeding   Vaginal discharge and itching or pain and burning during urination  Leg/calf pain or swelling on one side  severe abdominal pain  nausea and vomiting more than 4-5 times a day, or if you are unable to keep anything down  fever more than 100.4 degrees F.     Make plans for transportation and  as needed for when you are going to the hospital.    Ask your health care provider about vaccinations you may need following delivery. By now, you should have received a Tdap immunization to protect against pertussis or whooping cough. Fathers and family members who will be in close contact with the baby should also receive a Tdap shot at least two weeks before the expected birth of the baby if they have not had a Td (tetanus) shot for at least two years.    Your midwife may offer to check your cervix for changes. If you are past your due date, discuss the next steps leading to delivery  with your midwife. If you don't start labor on your own by 41 or 42 weeks, your midwife may recommend giving you medicines to ripen your cervix and start labor.  Induction of labor: http://onlinelibrary.dubois.com/store/10.1016/j.jmwh.2008.04.018/asset/j.jmwh.2008.04.018.pdf?v=1&t=nktc7tmq&[email or web address]    Tell your midwife or physician how you plan to feed your baby (breast or bottle), who you have chosen to do pediatric care for your baby, and if you have a boy, whether you have chosen to have him circumcised. You will need a car seat correctly installed in your vehicle to bring your baby home. As you start to set up the nursery at home for your baby, make sure the crib is safe. The mattress needs to fit snugly against the edges of the crib. If you can fit a soda can between the bars, they are too far apart and can allow the baby's head to caught between them.    Learn about infant care and feeding, including information about infant CPR. We recommend that you put your baby to sleep on his or her back to reduce the chance of Sudden Infant Death Syndrome (SIDS). To maintain a healthy environment in which your child can grow, it's best to keep your home smoke-free. By preparing ahead, your transition into parenthood will go smoothly for you and your baby.    Your midwife will want to see you for a checkup 2 to 6 weeks after delivery.      Making Plans for Feeding My Baby    By this point, you probably have read a lot about feeding your baby.  Breastfeed or formula? Each mother s decision is her own and [facility] respects you and your choices. We ve gathered information on both breastfeeding and formula feeding to help with your decision. Talking with your physician or nurse-midwife can also help in your decision.  However you plan to feed your baby, [facility] Maternity Care Centers encourage rooming in with your baby, skin-to-skin contact and feeding your baby based on his or her  cues.    Skin-to-skin contact  Being close to mom helps your baby adjust to life outside of the womb.  It helps your baby regulate their body temperature, heart rate, and breathing.  Your baby will usually be placed skin-to-skin immediately following birth or as soon as possible, if medical intervention is needed.    Rooming-In  Having your baby stay with you in your room is called  rooming-in .  Keeping your baby in your room helps you to learn how to care for your baby by getting to know your baby s cues, body rhythms and sleep cycle.       Cue-based feeding  Cues (signals) are baby s way of telling you what he or she wants.  When you learn your infant s cues, you know how to care for and feed your baby.   Feeding cues are the licking and smacking of lips, bringing their fist to their mouth, and a reflex called  rooting - where baby turns and opens his or her mouth, searching for the breast or bottle.  Crying is a late feeding cue.  Babies can feed frequently, often at least 8 times in 24 hours.  Breastfeeding facts  Breast milk is the best source of nutrition for your baby and is available at birth. In the first couple of days, your milk volume is already starting to increase, though it may not be noticeable. Breastfeed frequently to increase your milk supply. Within three to five days, you will begin to notice larger milk volumes. An increase in breast size, heaviness and firmness are often described as the milk  coming in.  Frequent breastfeeding can help breasts from getting overly firm and painful. You will know the baby is getting enough milk if your baby is having wet and dirty diapers and gaining weight.     If your goal is to exclusively breastfeed, it is important to not use any formula or artificial nipples (including bottles and pacifiers) while your baby is learning to breastfeed.  While it may seem like an  easy  option to give your baby a bottle, formula should only be given if there is a medical  reason for your baby to have it.    Positioning and attachment   Get comfortable.  Use pillows as needed to support your arms and baby.  Hold baby close at the level of your breast, facing you in a tummy to tummy position.  Skin to skin helps with this.  Position the baby with his or her nose by the nipple.  There should be a straight line from baby s ear to shoulder to hips.  Tickle your baby s lips or wait for baby to open mouth wide, bring baby to breast by leading with the chin.  Aim the nipple at the roof of baby s mouth.  A rapid sucking pattern is followed by longer, drawing pattern with occasional swallows heard.  When baby is correctly latched, your nipple and much of the areola are pulled well into baby s mouth.      Returning to work or school  Focus on a good start to breastfeeding.  Many women continue to provide breastmilk for their baby when they return to work or school.  Making plans about where to pump and store milk can make the transition go well.  Talk with other mothers who have also returned to work or school for tips and support.  Your employer s Human Resource department may be a resource as well.     Returning to work or school: (continued)   babies can mean fewer  sick  days for you.  A quality breast pump will also save time and add comfort.  Check with your insurance prior to giving birth for breast pump coverage.  Many insurance companies include a pump within your benefits.  Wait until your baby is at least three weeks old to introduce a bottle for the first time.  Have someone besides you give the bottle.  Breastfeed when you are with your baby. Reserve your bottles of breast milk for when you are away.   Your breasts will need to be  emptied  either by your baby or a pump.  Plan to pump at least twice in an eight hour day.  If you cannot pump at work, continue breastfeeding at home. Any amount of breast milk is worth giving to your baby.    Formula feeding facts  If you are  planning to use formula to feed your baby, you will want to make some preparations ahead of time. Talk to your doctor or nurse-midwife about what type of formula to use. Some are iron-fortified, meaning they have extra iron in them. You will want to purchase formula and bottles before your baby is born to be sure you are ready after you return from the hospital. The [facility] do not provide formula samples to take home.    Be sure to follow formula mixing directions closely. Regular milk in the dairy case at the grocery store should not be given to babies under 1 year old. Baby formula is sold in several forms including:  Ready-to-use. This is the most expensive, but no mixing is necessary.  Concentrated liquid. This is less expensive than ready-to-use and you mix with water.  Powder. This is the least expensive. You mix one level scoop of powdered formula with two ounces of water and stir well.    Most babies need 2.5 ounces of formula per pound of body weight each day. This means an 8-pound baby may drink about 20 ounces of formula a day; however, this is just an estimate. The most important thing is to pay attention to your baby s cues.  If your baby is always fussy, needs more iron or has certain food allergies, your physician may suggest you change your baby s formula to a different kind.     How do I warm my baby s bottles?  You may feed your baby a bottle without warming it first. It is OK for the breast milk or formula to be cool or room temperature. If your baby seems to prefer it warmed, you can put the filled bottle in a container of warm water and let it stand for a few minutes. Check the temperature of the liquid on your skin before feeding it to your baby; to be sure it isn t too hot. Do not heat bottles in the microwave. Microwaves heat food and liquids unevenly, and this can cause hot spots that can burn your baby.    How do I clean and sterilize bottles?  Sterilize bottles and nipples  before you use them for the first time. You can do this by putting them in boiling water for 5 minutes. After that first time, you can wash them in hot and soapy water. Rinse them carefully to be sure there is no soap left on them. You can also wash them in the .      Am I in Labor?  What is labor? Labor is the work that your body does to birth your baby. Your uterus (the womb) contracts(tightens). The contractions(labor pains) push your baby down onto your cervix(the opening ofyour uterus). Thispressure causesyour cervix to open. When your cervix iscompletely open (10 centimeters dilated), you will push your baby through your vagina and out into the world.  What do contractions feel like? When contractionsfirst start, theyusually feellike cramps duringyour period. Sometimesyoufeelpain in your back. Mostoften,contractions feel like muscles pulling painfully in your lower belly. At first, the contractions will probably be 15 to 20 minutes apart.They maybe irregular and will not feel too painful. As labor goes on, the contractionsget stronger,closer together, more consistent, and more painful.  How do I time the contractions? When the contractionsseem to be coming regularly, youshould start to time them.You time your contractions by counting the number of minutes from the start of one contraction to the start of the next contraction.  What should I do during early labor when the contractions start? If it is night andyoucan sleep, do so. If it happensduring the day, there are some things you can do to take care of yourself at home: Walk. If the painsyou are having are reallabor, walking will makethecontractionscome closer together and they will be stronger,but you will be able to cope with them better if you are standing or moving around. If the contractions are early labor ones that come andgo (sometimes called false labor), walkingcan make them go away. Take a shower or bath. This will help you relax. Eat.  Labor is a big event.Your body needs a lot of energy to be effective.Eat whatever you feel like eating. Drink water. Not drinking enough water can cause contractions to not be as effectiveas theyshould be.You need to be well hydrated (drinking enoughwater) to help your body work well during labor. Take a na p. If youfeel tired, lay down on your side and get all the rest you can. It helps to be rested when you go intoactive labor. Do something you enjoy. Spend time with family. Watch a movie. Distraction will help you relax. Get a massage. If your labor is in your back, a strong massage on your lower back may feel very good. Getting a foot massage or having a partner rub your feet can also be very relaxing. Don t panic. You can do this. Your body was made for this. You are strong!  When should I call my health care provider if I think I am in labor? Your contractions have been 5 minutes or less apart for at least an hour. Your contractions are becoming so painful youcannot walk or talk during one. You think your amniotic sac (bag of sandra) breaks. You may have a big gush of amniotic fluid (water) or just fluid that runs down your legs when you walk or move or change position.  Are there other reasons to call my health care provider? If you are concerned about anything, don t hesitate to call your health care provider.You should definitely call your health care provider or go to the hospital if:  It is 3 weeks or more before your due date, and you are having contractions.  You have vaginal bleeding that is more than your period, soaks your underwear, or runs down your legs.  You have sudden severe pain that does not go away with rest.  Your baby has not movedfor several hours.  You are leaking greenish fluid.    For More Information: http://onlinelibrary.dubois.com/doi/10.1111/jmwh.79642/epdf     US Department of Health and Human Services: Signs oflabor,labor stages, and types of  birth  http://womenshealth.gov/pregnancy/childbirth-beyond/labor-birth.html#a      Childbirth and Parenting Education:       Everyday Miracles:   https://www.everyday-miracles.org/    Free Video Series from [facility]: https://nursing.[facility]/academics/specialty-areas/nurse-midwifery/having-baby-prenatal-videos/having-baby-prenatal-and    [facility] parenting center: http://Intelclinic[facility] HospitalRetailNext/   ([phone]  [place]: (education, yoga & wellness) www.[employer]  Enlightened Mama: www.enlightenedmama.MDconnectME   Childbirth collective: (Parent topic nights)  www.childbirthcollective.org/  Hypnobabies:  www.hypnobabiestwincities.MDconnectME/  Hypnobirthing:  Http://hypnobirthing.MDconnectME/  The Birth Hour: https://Finexkap/online-childbirth-class/    APPS and Podcasts:   Ryne Adams Nurture    Evidence Based Birth  The Birth Hour (for birth stories)   Birthful   Expectful   The Longest Shortest Time  PregnancyPodcast Yelitza Person    Book Recommendations:   Patsy Ozark's Birthing From Within--first few chapters include a new-age tone, you may prefer to skip it and keep going, because there is good stuff later.  This book recommendation covers emotional preparation, but does cover coping with pain, and use of both pharmacological and nonpharmacological methods.    Dr. Evans' The Pregnancy Book and The Birth Book--the pregnancy book goes month-by month      The Birth Partner by Sushma Rocha    Womanly Art of Breastfeeding by La Leche League International   Bestfeeding by Hannah Smiley--great pictures    Mothering Your Nursing Toddler, by Eloise Momin.   Addresses dealing with so many of the challenging behaviors of a nursing toddler.  How Weaning Happens, by La Leche League.  Discusses weaning at all ages, from medically necessary weaning of an infant, all the way up to age 5 (or older), with why/why not, and strategies.  Very empowering book both for deciding to wean and deciding not to.    American College of  "Nurse-Midwives (ACNM) http://www.midwife.org/; look at the informational handouts at http://www.midwife.org/Share-With-Women     www.mymidwife.org    Mother to Baby (Medication and Herbal guidance in pregnancy): http://www.mothertobaby.org  Toll-Free Hotline: 608.838.7180  LactMed (Medication use while breastfeeding): http://toxnet.nlm.nih.gov/newtoxnet/lactmed.htm    Women's Health.gov:  http://www.womenshealth.gov/a-z-topics/index.html    American pregnancy association - http://americanpregnancy.org    Centering Pregnancy (group prenatal care option): http://centeringhealthcare.org    Information about doulas:  Childbirth collective: http://www.childbirthcollective.org/  Doulas of [place] (EFRA):  www.efra.org  [facility]  project: http://Social Games Heraldcitiesdoulaproject.com/     Early Childhood and Family Education (ECFE):  ECFE offers parents hands-on learning experiences that will nourish a lifetime of teachable moments.  http://ecfe.info/ecfe-home/    March of Dimes www.Xierkang.Skimlinks     FDA - Nutrition  www.mypyramid.gov  Under \"For Consumers,\" click on \"pregnant and breastfeeding women.\"      Centers for Disease Control and Prevention (CDC) - Vaccines : http://www.cdc.gov/vaccines/       When researching information on the web, question the validity of websites.  The domains .gov, .edu and.org tend to be more reliable information.  If there are a lot of advertisements, be cautious of the information provided. Stay away from blogs and chat rooms please!     [facility] Breastfeeding Support    Early Childhood Family Education [place] [address] provides a free, drop-in class/breastfeeding support group, facilitated by a lactation consultant and .  During the group you can connect with other parents, weigh your baby, ask questions about feeding and baby development, and more.  You do not need to register.  Fall in-person meetings will begin on September 12, are for parents of babies from birth to 9 " "months, and will meet on Monday evenings from 6 - 7:15 pm in [facility], which is at [address] [facility].  [place] [address] also offers virtual group meetings with a lactation consultant/.  These take place on , from 11:30 am - 12:30 pm for parents of newborns to 3-month-olds, and from 4:15 - 5:15 for parents of 3 - 9 - month olds.  To get the meeting link contact Yen Stone at [phone].    [facility] offers a free, drop-in breastfeeding support group facilitated by CHRISTIE Pete.   at [place] [facility] [address], [place].  A scale is available to check baby weights, if desired.  [place] also has a variety of new parent classes:  (cost for registration)  https://Sciences-U/classes/    [facility] Lactation Lounge facilitated by CHRISTIE Grimm:  Free, drop-in support group for breastfeeding, with baby scale available if needed.  Meets at [facility], second Tuesday of each month, 10 am - 12 pm.  Text [phone] for info.    [facility] Support Group,  at 10:30 am   Run by CHRISTIE Chaney of [employer] [facility] [employer]   Go to [employer] [facility] Facebook page, click on \"events\" for link:   Https://www.[email or web address]/events/[email or web address]/    [facility] [place] breastfeeding support community:  free, drop-in breastfeeding support facilitated by Certified Lactation Counselors, open  and  from 1 pm - 5 pm.  In [place]:  [facility] [place], [address] [facility]:   guidingstjenarokota.org    [facility] Milk Hour,  at 2:30 pm    Run by CHRISTIE Treviño  Go to [facility] FB page and send message to get link   Https://www.GridX.com/healthfoundations/    Manish Bryant:  http://www.[facility] Hospitalndas.org/    [facility] offers a Lactation Lounge every Friday 12pm - 1pm, run by Marianne" "Manish Holder Leader.  Sign up via link at Sponsify/cbe-lactation   https://www.Sponsify/cbe-lactation    [facility] is offering virtual support groups every Monday, 10:30 am - 12 pm, run by RN IBCLC: Https://www.Xtone.com/events/[email or web address]/    Culturally-Specific Breastfeeding Support:     For Hmong Families:   The Hmong Breastfeeding Coalition is a wonderful support for [place] Hmong women who are breastfeeding.  They are best found on Facebook.    for Black families:    Chocolate Milk Club:  http://www."[employer]/chocolate-milk-club/  Email: Marcelino@[employer]    R.O.S.E. = Reaching our Sisters Everywhere  Http://www.breastfeedingrose.org/    Club Mom breastfeeding support for Black mothers:  Contact Hoda Brady  Phone: [phone]   Email:  Teresa@[email or web address].     Alta Morley  Phone: [phone]   Email:  [email or web address].    Club Dad parent support for Black fathers:   Contact Fabián Lr   Phone: [phone]   Email:  Jack@[facility]    For Native/Indigenous Families:    https://www.Xtone.com/groups/nitamising.gimashkikinaan   Virtual Breastfeeding Support:    During this time of isolation, breastfeeding families need even more community!  Here are some area organizations offering virtual support groups for breastfeeding:    [facility] Support Group, Tuesdays at 10:30 am   Run by CHRISTIE Chaney of [employer] [facility] [employer]   Go to [employer] Facebook page and click on \"events\" for link   https://www.facebook.com/events/186073888589072/  [facility] Milk Hour, Thursdays at 2:30 pm    Run by CHRISTIE Treviño   Go to [facility] FB page and send message to get link   https://www.Xtone.com/healthfoundations/  [facility] holding virtual " meetings the first Tuesday of each month, 8-9 pm, and the   Third Saturday, 10 - 11 am.  Go to La Leche League of [place] and [place] FB page; message to get link https://www.[employer].com/LLDanettefGClair/?hc_location=ufi  [facility] offers a Lactation Lounge every Friday 12pm - 1pm, run by Manish Hill Leader   Sign up via link at [employer]/cbe-lactation   https://www.[employer]/cbe-lactation  [facility] is offering virtual support groups every Monday, 10:30 am - 12 pm, run by nurse CHRISTIE   Https://www.facebook.com/events/[email or web address]/    Prenatal Breastfeeding Classes:      [facility] is offering virtual breastfeeding and  care classes:  https://www.Lynxx Innovations/education-workshops  BirthEd childbirth and breastfeeding education offering virtual prenatal breastfeeding classes  https://www.birthedmn.com/workshops

## 2023-04-04 LAB — GP B STREP DNA SPEC QL NAA+PROBE: NEGATIVE

## 2023-04-10 ENCOUNTER — PRENATAL OFFICE VISIT (OUTPATIENT)
Dept: MIDWIFE SERVICES | Facility: CLINIC | Age: 28
End: 2023-04-10
Payer: COMMERCIAL

## 2023-04-10 ENCOUNTER — MYC MEDICAL ADVICE (OUTPATIENT)
Dept: EDUCATION SERVICES | Facility: CLINIC | Age: 28
End: 2023-04-10

## 2023-04-10 VITALS
WEIGHT: 197 LBS | HEART RATE: 76 BPM | HEIGHT: 63 IN | BODY MASS INDEX: 34.91 KG/M2 | DIASTOLIC BLOOD PRESSURE: 60 MMHG | SYSTOLIC BLOOD PRESSURE: 116 MMHG

## 2023-04-10 DIAGNOSIS — Z34.80 SUPERVISION OF OTHER NORMAL PREGNANCY, ANTEPARTUM: Primary | ICD-10-CM

## 2023-04-10 DIAGNOSIS — O24.410 DIET CONTROLLED GESTATIONAL DIABETES MELLITUS (GDM) IN THIRD TRIMESTER: ICD-10-CM

## 2023-04-10 PROCEDURE — 99207 PR PRENATAL VISIT: CPT | Performed by: MIDWIFE

## 2023-04-10 NOTE — PROGRESS NOTES
Mirian presents to clinic accompanied by Dewayne at 37w2d.  Mirian has been feeling good, ready for baby. She is still running, noticing more shooting nerve pain into groin in the past 5 days. Mirian sent her blood sugar log into diabetic ed - this author reviewed and all fasting and 1hr postprandial blood sugars below goal. Feeling ready at home with the nursery. Discussed labor plans - hoping to labor in tub and use nitrous, eventually wants an epidural. Reviewed signs of labor, when to call on-call midwife, has number readily available. Discussed calling with labor and reviewed the possibility of divert.     RTC 1 week or sooner PRN

## 2023-04-10 NOTE — PATIENT INSTRUCTIONS
"\"We hope you had a positive experience and that you can definitely recommend [facility] to your family and friends. You ll be receiving a survey soon and we look forward to hearing your feedback\".    [facility] - Contact information:  Appointment line and to get a hold of CNM in clinic Monday-Friday 8 am - 5 pm:  [phone].  There are some clinics with early start times (1st appointment 7:40 am) and others with evening hours (last appointment 6:20 pm).  Most are typically open from 8 am to 5 pm.    CNM on call answering service: [phone].  Specify your hospital of choice and leave a brief message for CNM;  will then page CNM who is on call at your specified hospital and you should receive a call back with 15 minutes.  Be sure that your ringer is audible and that you can accept blocked calls so that we can get back in touch with you! This number should be reserved for urgent needs if during the day, before 8 am, after 5 pm, weekends, holidays.    Contact the on-call CNM with warning signs, such as:  vaginal bleeding   Vaginal discharge and itching or pain and burning during urination  Leg/calf pain or swelling on one side  severe abdominal pain  nausea and vomiting more than 4-5 times a day, or if you are unable to keep anything down  fever more than 100.4 degrees F.     giddyshaet  After each of your visits you are welcome to check [facility] [employer] for your visit summary including education and links to information relevant to your pregnancy and/or well woman care.   Find the \"Visits\" tab at the top of the page, you will see a list of recent visits and for each visit a for link for \"View After Visit Summary.\" View of your After Visit Summary will allow you to read our recommendations from your visit, review any education provided, and link to websites with useful information.   If you have any questions or difficulty navigating [place] [facility], please feel free to contact us and we will " "do our best to direct you.    Meet the Midwives from [facility]  You are invited to an informational meet and greet with [facility] Certified Nurse-Midwives. Our free \"Meet the Midwives\" event is a great opportunity to learn about our midwives' philosophy and experience, the hospitals where we can assist with your birth, and answer questions you may have. Partners, friends, and family are welcome to attend. Currently, this is a virtual event.  Dates: Last Thursday of every month at 7 pm.    Link to next (live) meeting  [email or web address]  To Join by Telephone (audio only) Call:   [phone] Phone Conference ID: [phone] #      Touring the Maternity Care Center  At this time we are offering a virtual tour of the Maternity Care Centers at both [facility] and [facility]:   [facility] Maternity Care:   [email or web address]-[email or web address]-at--[facility] Maternity Care:   [email or web address].[email or web address]/locations/the-birthplace-at-[facility]    Scroll to the bottom of this hyperlink if the above link does not work      Childbirth and Parenting Education:     Everyday Miracles:   https://www.everyday-miracles.org/    Free Video Series from [facility]: https://nursing.[facility]/academics/specialty-areas/nurse-midwifery/having-baby-prenatal-videos/having-baby-prenatal-and    MIKE parenting center: http://Mimesis Republic.Oatmeal/   [phone]  [place]: (education, yoga & wellness) www.Wavestream.Oatmeal  Enlightened Mama: www.enlightenedmama.com   Childbirth collective: (Parent topic nights)  www.childbirthcollective.org/  Hypnobabies:  www.hypnobabiestwincities.com/  Hypnobirthing:  Http://hypnobirthing.com/  The Birth Hour: https://LocalLuxbirthDailyPath/online-childbirth-class/    APPS and Podcasts:   Ryne Kincaidture    Evidence Based Birth  The Birth " Hour (for birth stories)   Birthful   Expectful   The Longest Shortest Time  PregnancyPodcast Yelitza Person    Book Recommendations:   Patsy Biju's Birthing From Within--first few chapters include a new-age tone, you may prefer to skip it and keep going, because there is good stuff later.  This book recommendation covers emotional preparation, but does cover coping with pain, and use of both pharmacological and nonpharmacological methods.    Dr. Evans' The Pregnancy Book and The Birth Book--the pregnancy book goes month-by month      The Birth Partner by Sushma López    Womanly Art of Breastfeeding by La Leche League International   Breastfeeding by Hannah Smiley--great pictures    Mothering Your Nursing Toddler, by Eloise Momin.   Addresses dealing with so many of the challenging behaviors of a nursing toddler.  How Weaning Happens, by La Leche League.  Discusses weaning at all ages, from medically necessary weaning of an infant, all the way up to age 5 (or older), with why/why not, and strategies.  Very empowering book both for deciding to wean and deciding not to.    American College of Nurse-Midwives (ACNM) http://www.midwife.org/; look at the informational handouts at http://www.midwife.org/Share-With-Women     www.mymidwife.org    Mother to Baby (Medication and Herbal guidance in pregnancy): http://www.mothertobaby.org  Toll-Free Hotline: 487.483.7741  LactMed (Medication use while breastfeeding): http://toxnet.nlm.nih.gov/newtoxnet/lactmed.htm    Women's Health.gov:  http://www.womenshealth.gov/a-z-topics/index.html    American pregnancy association - http://americanpregnancy.org    Centering Pregnancy (group prenatal care option): http://centeringhealthcare.org    Information about doulas:  Childbirth collective: http://www.childbirthcollective.org/  Doulas of [place] (EFRA):  www.efra.org  [facility]fanli [facility]  project: http://twincitiesdoulaproject.com/     Early Childhood and Family Education  "(ECFE):  ECFE offers parents hands-on learning experiences that will nourish a lifetime of teachable moments.  http://ecfe.info/ecfe-home/    March of Dimes www.HighGround     FDA - Nutrition  www.mypyramid.gov  Under \"For Consumers,\" click on \"pregnant and breastfeeding women.\"      Centers for Disease Control and Prevention (CDC) - Vaccines : http://www.cdc.gov/vaccines/       When researching information on the web, question the validity of websites.  The domains .gov, .edu and.org tend to be more reliable information.  If there are a lot of advertisements, be cautious of the information provided. Stay away from blogs and chat rooms please!     Making Plans for Feeding My Baby    By this point, you probably have read a lot about feeding your baby.  Breastfeed or formula? Each parent's decision is their own and [facility] Nurse Midwives [facility] respects you and your choices. We ve gathered information on both breastfeeding and formula feeding to help with your decision. Talking with your nurse-midwife can also help in your decision.  However you plan to feed your baby, [facility] encourage rooming in with your baby, skin-to-skin contact and feeding your baby based on his or her cues.    Skin-to-skin contact  Being close to mom helps your baby adjust to life outside of the womb.  It helps your baby regulate their body temperature, heart rate, and breathing.  Your baby will usually be placed skin-to-skin immediately following birth or as soon as possible, if medical intervention is needed.    Rooming-In  Having your baby stay with you in your room is called  rooming-in .  Keeping your baby in your room helps you to learn how to care for your baby by getting to know your baby s cues, body rhythms and sleep cycle.       Cue-based feeding  Cues (signals) are baby s way of telling you what he or she wants.  When you learn your infant s cues, you know how to care for and feed " your baby.   Feeding cues are the licking and smacking of lips, bringing their fist to their mouth, and a reflex called  rooting - where baby turns and opens his or her mouth, searching for the breast or bottle.  Crying is a late feeding cue.  Babies can feed frequently, often at least 8 times in 24 hours.    Breastfeeding facts  Breast milk is the best source of nutrition for your baby and is available at birth. In the first couple of days, your milk volume is already starting to increase, though it may not be noticeable. Breastfeed frequently to increase your milk supply. Within three to five days, you will begin to notice larger milk volumes. An increase in breast size, heaviness and firmness are often described as the milk  coming in.  Frequent breastfeeding can help breasts from getting overly firm and painful. You will know the baby is getting enough milk if your baby is having wet and dirty diapers and gaining weight.     If your goal is to exclusively breastfeed, it is important to not use any formula or artificial nipples (including bottles and pacifiers) while your baby is learning to breastfeed.  While it may seem like an  easy  option to give your baby a bottle, formula should only be given if there is a medical reason for your baby to have it.      Positioning and attachment   Get comfortable.  Use pillows as needed to support your arms and baby.  Hold baby close at the level of your breast, facing you in a tummy to tummy position.  Skin to skin helps with this.  Position the baby with his or her nose by the nipple.  There should be a straight line from baby s ear to shoulder to hips.  Tickle your baby s lips or wait for baby to open mouth wide, bring baby to breast by leading with the chin.  Aim the nipple at the roof of baby s mouth.  A rapid sucking pattern is followed by longer, drawing pattern with occasional swallows heard.  When baby is correctly latched, your nipple and much of the areola are  pulled well into baby s mouth.      Returning to work or school  Focus on a good start to breastfeeding.  Many women continue to provide breastmilk for their baby when they return to work or school.  Making plans about where to pump and store milk can make the transition go well.  Talk with other mothers who have also returned to work or school for tips and support.  Your employer s Human Resource department may be a resource as well.     Returning to work or school: (continued)   babies can mean fewer  sick  days for you.  A quality breast pump will also save time and add comfort.  Check with your insurance prior to giving birth for breast pump coverage.  Many insurance companies include a pump within your benefits.  Wait until your baby is at least three weeks old to introduce a bottle for the first time.  Have someone besides you give the bottle.  Breastfeed when you are with your baby. Reserve your bottles of breast milk for when you are away.   Your breasts will need to be  emptied  either by your baby or a pump.  Plan to pump at least twice in an eight hour day.  If you cannot pump at work, continue breastfeeding at home. Any amount of breast milk is worth giving to your baby.    Formula feeding facts  If you are planning to use formula to feed your baby, you will want to make some preparations ahead of time. Talk to your doctor or nurse-midwife about what type of formula to use. Some are iron-fortified, meaning they have extra iron in them. You will want to purchase formula and bottles before your baby is born to be sure you are ready after you return from the hospital. The [facility] do not provide formula samples to take home.    Be sure to follow formula mixing directions closely. Regular milk in the dairy case at the grocery store should not be given to babies under 1 year old. Baby formula is sold in several forms including:  Ready-to-use. This is the most expensive, but no mixing is  necessary.  Concentrated liquid. This is less expensive than ready-to-use and you mix with water.  Powder. This is the least expensive. You mix one level scoop of powdered formula with two ounces of water and stir well.    Most babies need 2.5 ounces of formula per pound of body weight each day. This means an 8-pound baby may drink about 20 ounces of formula a day; however, this is just an estimate. The most important thing is to pay attention to your baby s cues.  If your baby is always fussy, needs more iron or has certain food allergies, your physician may suggest you change your baby s formula to a different kind.     How do I warm my baby s bottles?  You may feed your baby a bottle without warming it first. It is OK for the breast milk or formula to be cool or room temperature. If your baby seems to prefer it warmed, you can put the filled bottle in a container of warm water and let it stand for a few minutes. Check the temperature of the liquid on your skin before feeding it to your baby; to be sure it isn t too hot. Do not heat bottles in the microwave. Microwaves heat food and liquids unevenly, and this can cause hot spots that can burn your baby.    How do I clean and sterilize bottles?  Sterilize bottles and nipples before you use them for the first time. You can do this by putting them in boiling water for 5 minutes. After that first time, you can wash them in hot and soapy water. Rinse them carefully to be sure there is no soap left on them. You can also wash them in the .    Breastfeeding/Chestfeeding Support  Contact us  Breastfeeding/chestfeeding is the natural and healthy way for parents to feed their babies and provides the best source of nutrition in most cases to infants. Breast milk has health benefits for mom, too. The American Academy of Pediatrics recommends exclusively breastfeeding for 6 months for optimal growth and development and breastfeeding up to at least a year.  Benefits to  baby:  Easy digestion, less diarrhea and constipation, breast milk is easy to digest.  Lots of bonding with parent.  Less likely to have asthma.  Less ear infections and respiratory infections.  Less likely to have Type 2 Diabetes.  Less likely to become obese.  Lower risk of Sudden Infant Death Syndrome (SIDS).  Benefits to breastfeeding/chestfeeding parent:  Helps with weight loss.  Lower risk of ovarian and breast cancer, Type 2 diabetes and heart disease.  /chestfed babies are easy to take on trips. Just grab the diapers and go!  Breastfeeding/chestfeeding saves money (no formula or bottle costs, fewer doctor bills and medication costs).  Ready to breastfeed/chestfeed anywhere, don t need to make and wash bottles.  Breastfeeding/chestfeeding is wonderful, but not always easy. Learning what to expect ahead of time and get support from a lactation professional. Check out the [facility] Breastfeeding Resource Guide for classes, drop in support groups, childbirth education, Doulas and clinic and hospital lactation services.   B    reastfeeding clinic visits with [facility] [place] [facility] IBCLC lactation consultants are at [facility] on , [facility] on  and [facility] on . Call to schedule, [phone].    New Parent Connection:   [facility], [address] [facility]  In-person meetings on  from 6 pm - 7:15 pm for parents of  to 9 months, at the same site.   All are free, drop-in, no registration required.    There are also free virtual meetings ongoing on :  11:30 am - 12:30 pm for parents of newborns to 3 months  4:15 pm to 5:15 pm for parents of 3 to 9-month olds  For joining info parents should call Yen Stone at [phone]    [facility] Baby Café  Due to COVID-19, all Baby Café sessions are canceled until further notice. For lactation support, please contact one of our bilingual staff:  Lola (IBCLC)  "[phone]  Jaimie (IBCLC/ Israeli) [phone]  Wil (Hmong) [phone]  Dwayne (British) [phone]  Baby Café is a free, drop-in service offering breastfeeding/chestfeeding support. Come share tips and socialize with other pregnant, breastfeeding/chestfeeding families. Babies and siblings are welcome (no  available).  We offer:  Professionally trained lactation staff.  Resource books for lending.  Relaxed and fun atmosphere.  Refreshments.  Locations  Baby Café is offered at several locations.  Please see below for the Baby Café closest to you.  CANCELED UNTIL FURTHER NOTICE  [facility]  [address] [address]  1st Wednesdays of the month   10 a.m. - Noon  CANCELED UNTIL FURTHER NOTICE  [facility]  [address], [place], [address]  4th Wednesdays of the month   10 a.m. - 12:30 p.m.     CANCELED UNTIL FURTHER NOTICE  [facility]  [address], [place], [address]  4th Wednesdays of the month  4 - 6 p.m.  CANCELED UNTIL FURTHER NOTICE  [employer] [employer] [address], [place], [facility]  2nd Thursdays of the month.  9:30-11:30 a.m.  Enter through the east end of the building, the blue Door C.  Ring the ECFE buzzer to be let in.   More information  Jaimie Ballesteros  [phone]  [email or web address].         Virtual Breastfeeding Support:    During this time of isolation, breastfeeding families need even more community!  Here are some area organizations offering virtual support groups for breastfeeding:    [facility] Support Group, Tuesdays at 10:30 am   Run by CHRISTIE Chaney of [employer] [facility]   Go to [facility] Facebook page and click on \"events\" for link   [email or web address]  [facility] Milk Hour, Thursdays at 2:30 pm    Run by CHRISTIE Treviño   Go to [facility] FB " page and send message to get link   [email or web address]  [facility]he [facility] holding virtual meetings the first Tuesday of each month, 8-9 pm, and the   Third Saturday, 10 - 11 am.  Go to [facility] FB page; message to get link [email or web address]_location=[facility]  [employer] offers a Lactation Lounge every Friday 12pm - 1pm, run by Marianne Holder [facility] Leader   Sign up via link at Happy Inspector/cbe-lactation   https://www.Happy Inspector/cbe-lactation  [facility] is offering virtual support groups every Monday, 10:30 am - 12 pm, run by nurse CHRISTIE   [email or web address]    Prenatal Breastfeeding Classes:      [employer] is offering virtual breastfeeding and  care classes:  https://www.Happy Inspector/education-workshops  BirthEd childbirth and breastfeeding education offering virtual prenatal breastfeeding classes  Https://www.Relevant e-solution.Kodak Alaris/workshops      Preparing for your baby:   [facility] Home Screening Program  Http://www.health.[facility].mn.us/newbornscreening/  [place] newborns are tested soon after birth for more than 50 hidden, rare disorders, including hearing loss and critical congenital heart disease (CCHD). This site provides resources and information for families and providers.    When to call:   Appointment line and to get a hold of CNM in clinic Monday-Friday 8 am - 5 pm:  [phone].  There are some clinics with early start times (1st appointment 7:40 am) and others with evening hours (last appointment 6:20 pm).  Most are typically open from 8 am to 5 pm.    CNM on call answering service: [phone].  Specify your hospital of choice and leave a brief message for CNM;  will then page CNM who is on call at your specified hospital and you should receive a call back with 15 minutes.  Be sure that your ringer is audible and that you can accept  blocked calls so that we can get back in touch with you! This number should be reserved for urgent needs if during the day, before 8 am, after 5 pm, weekends, holidays.    Contact the on-call CNM with warning signs, such as:  vaginal bleeding   Vaginal discharge and itching or pain and burning during urination  Leg/calf pain or swelling on one side  severe abdominal pain  nausea and vomiting more than 4-5 times a day, or if you are unable to keep anything down  fever more than 100.4 degrees F.     Make plans for transportation and  as needed for when you are going to the hospital.    Ask your health care provider about vaccinations you may need following delivery. By now, you should have received a Tdap immunization to protect against pertussis or whooping cough. Fathers and family members who will be in close contact with the baby should also receive a Tdap shot at least two weeks before the expected birth of the baby if they have not had a Td (tetanus) shot for at least two years.    Your midwife may offer to check your cervix for changes. If you are past your due date, discuss the next steps leading to delivery with your midwife. If you don't start labor on your own by 41 or 42 weeks, your midwife may recommend giving you medicines to ripen your cervix and start labor.  Induction of labor: http://onlinelibrary.dubois.com/store/10.1016/j.jmwh.2008.04.018/asset/j.jmwh.[email or web address].pdf?v=1&t=nfoi4tqz&[id number]    Tell your midwife or physician how you plan to feed your baby (breast or bottle), who you have chosen to do pediatric care for your baby, and if you have a boy, whether you have chosen to have him circumcised. You will need a car seat correctly installed in your vehicle to bring your baby home. As you start to set up the nursery at home for your baby, make sure the crib is safe. The mattress needs to fit snugly against the edges of the crib. If you can fit a soda can  between the bars, they are too far apart and can allow the baby's head to caught between them.    Learn about infant care and feeding, including information about infant CPR. We recommend that you put your baby to sleep on his or her back to reduce the chance of Sudden Infant Death Syndrome (SIDS). To maintain a healthy environment in which your child can grow, it's best to keep your home smoke-free. By preparing ahead, your transition into parenthood will go smoothly for you and your baby.    Your midwife will want to see you for a checkup 2 to 6 weeks after delivery.

## 2023-04-17 ENCOUNTER — PRENATAL OFFICE VISIT (OUTPATIENT)
Dept: MIDWIFE SERVICES | Facility: CLINIC | Age: 28
End: 2023-04-17
Payer: COMMERCIAL

## 2023-04-17 VITALS
BODY MASS INDEX: 35.51 KG/M2 | HEIGHT: 63 IN | WEIGHT: 200.4 LBS | HEART RATE: 60 BPM | SYSTOLIC BLOOD PRESSURE: 120 MMHG | DIASTOLIC BLOOD PRESSURE: 74 MMHG

## 2023-04-17 DIAGNOSIS — Z34.80 SUPERVISION OF OTHER NORMAL PREGNANCY, ANTEPARTUM: ICD-10-CM

## 2023-04-17 PROCEDURE — 99207 PR PRENATAL VISIT: CPT | Performed by: MIDWIFE

## 2023-04-17 NOTE — PROGRESS NOTES
Mirian presents to clinic at 38w2d for routine prenatal    Very ready to be done being pregnant. Still running occasionally, noticing less shooting pain in pelvis. Feeling regular movement, denying signs of labor but did have some more painful contractions this weekend. Glucose log reviewed today for the past week, 1 fasting and 1 pp blood sugar outside of goal range. Discussed plan for postpartum contraception - had IUD (Mirena), would like to return to that method postpartum. Discussed possibility of placement at 6 week pp visit. Mirian has been hand expressing colostrum, had breastfeeding education in parenting class and not interested in additional antepartum lactation counseling. Will pursue if latch issues postpartum. Discussed option for IOL at 39 weeks due to GDMA1 - at this time Mirian and Dewayne are planning to wait for spontaneous labor. Requested cervical exam today but unable to reach due to no presenting part in the cervix. Cephalic by leopolds with ballotable head oriented toward left maternal hip. Handheld ultrasound confirmed vertex positioning.   Fundal height noted 37cm x3 weeks- Mirian feels baby has dropped significantly, continue to monitor.    RTC 1 week or PRN    Patient was seen with student, GOYO Moreland who was present for learning. I personally assessed, examined and made clinical decisions reflected in the documentation.

## 2023-04-23 ENCOUNTER — MYC MEDICAL ADVICE (OUTPATIENT)
Dept: EDUCATION SERVICES | Facility: CLINIC | Age: 28
End: 2023-04-23
Payer: COMMERCIAL

## 2023-04-24 ENCOUNTER — PRENATAL OFFICE VISIT (OUTPATIENT)
Dept: MIDWIFE SERVICES | Facility: CLINIC | Age: 28
End: 2023-04-24
Payer: COMMERCIAL

## 2023-04-24 VITALS
DIASTOLIC BLOOD PRESSURE: 70 MMHG | HEART RATE: 68 BPM | SYSTOLIC BLOOD PRESSURE: 108 MMHG | WEIGHT: 199.5 LBS | BODY MASS INDEX: 35.34 KG/M2

## 2023-04-24 DIAGNOSIS — Z34.80 SUPERVISION OF OTHER NORMAL PREGNANCY, ANTEPARTUM: Primary | ICD-10-CM

## 2023-04-24 DIAGNOSIS — O24.410 DIET CONTROLLED GESTATIONAL DIABETES MELLITUS (GDM) IN THIRD TRIMESTER: ICD-10-CM

## 2023-04-24 PROCEDURE — 99207 PR PRENATAL VISIT: CPT | Performed by: MIDWIFE

## 2023-04-24 NOTE — PATIENT INSTRUCTIONS
"\"We hope you had a positive experience and that you can definitely recommend [facility] to your family and friends. You ll be receiving a survey soon and we look forward to hearing your feedback\".    [facility] Nurse Midwives - Contact information:  Appointment line and to get a hold of CNM in clinic Monday-Friday 8 am - 5 pm:  [phone].  There are some clinics with early start times (1st appointment 7:40 am) and others with evening hours (last appointment 6:20 pm).  Most are typically open from 8 am to 5 pm.    CNM on call answering service: [phone].  Specify your hospital of choice and leave a brief message for CNM;  will then page CNM who is on call at your specified hospital and you should receive a call back with 15 minutes.  Be sure that your ringer is audible and that you can accept blocked calls so that we can get back in touch with you! This number should be reserved for urgent needs if during the day, before 8 am, after 5 pm, weekends, holidays.    Contact the on-call CNM with warning signs, such as:  vaginal bleeding   Vaginal discharge and itching or pain and burning during urination  Leg/calf pain or swelling on one side  severe abdominal pain  nausea and vomiting more than 4-5 times a day, or if you are unable to keep anything down  fever more than 100.4 degrees F.     Trunk Archiveshaet  After each of your visits you are welcome to check [email or web address] for your visit summary including education and links to information relevant to your pregnancy and/or well woman care.   Find the \"Visits\" tab at the top of the page, you will see a list of recent visits and for each visit a for link for \"View After Visit Summary.\" View of your After Visit Summary will allow you to read our recommendations from your visit, review any education provided, and link to websites with useful information.   If you have any questions or difficulty navigating MaxxAthlete, please feel free to contact us and we will " "do our best to direct you.    Meet the Midwives from [facility]  You are invited to an informational meet and greet with [facility] Certified Nurse-Midwives. Our free \"Meet the Midwives\" event is a great opportunity to learn about our midwives' philosophy and experience, the hospitals where we can assist with your birth, and answer questions you may have. Partners, friends, and family are welcome to attend. Currently, this is a virtual event.  Dates: Last Thursday of every month at 7 pm.    Link to next (live) meeting  [email or web address]  To Join by Telephone (audio only) Call:   [phone] Phone Conference ID: [phone] #      Touring the Maternity Care Center  At this time we are offering a virtual tour of the Maternity Care Centers at both [facility] and [facility]:   [facility] Maternity Care:   https://[facility].org/locations/the-birthplace-at--[facility] Maternity Care:   https://[facility].org/locations/the-birthplace-at-[facility]    Scroll to the bottom of this hyperlink if the above link does not work      Childbirth and Parenting Education:     Everyday Miracles:   https://www.everyday-miracles.org/    Free Video Series from [facility]: https://nursing.[facility]/academics/specialty-areas/nurse-midwifery/having-baby-prenatal-videos/having-baby-prenatal-and    MIKE parenting center: http://MRO.ISpeak/   (976) 888-AJYD  Blooma: (education, yoga & wellness) www.MedAptus.ISpeak  Enlightened Mama: www.enlightenedmama.com   Childbirth collective: (Parent topic nights)  www.childbirthcollective.org/  Hypnobabies:  www.hypnobabiestwincities.com/  Hypnobirthing:  Http://hypnobirthing.com/  The Birth Hour: https://[employer]ilar[employer]/online-childbirth-class/    APPS and Podcasts:   Ryne Kincaidture    Evidence Based Birth  The Birth " Hour (for birth stories)   Birthful   Expectful   The Longest Shortest Time  PregnancyPodcast Yelitza Person    Book Recommendations:   Patsy Biju's Birthing From Within--first few chapters include a new-age tone, you may prefer to skip it and keep going, because there is good stuff later.  This book recommendation covers emotional preparation, but does cover coping with pain, and use of both pharmacological and nonpharmacological methods.    Dr. Evans' The Pregnancy Book and The Birth Book--the pregnancy book goes month-by month      The Birth Partner by Sushma López    Womanly Art of Breastfeeding by La Leche League International   Breastfeeding by Hannah Smiley--great pictures    Mothering Your Nursing Toddler, by Eloise Momin.   Addresses dealing with so many of the challenging behaviors of a nursing toddler.  How Weaning Happens, by La Leche League.  Discusses weaning at all ages, from medically necessary weaning of an infant, all the way up to age 5 (or older), with why/why not, and strategies.  Very empowering book both for deciding to wean and deciding not to.    American College of Nurse-Midwives (ACNM) http://www.midwife.org/; look at the informational handouts at http://www.midwife.org/Share-With-Women     www.mymidwife.org    Mother to Baby (Medication and Herbal guidance in pregnancy): http://www.mothertobaby.org  Toll-Free Hotline: 464.927.4574  LactMed (Medication use while breastfeeding): http://toxnet.nlm.nih.gov/newtoxnet/lactmed.htm    Women's Health.gov:  http://www.womenshealth.gov/a-z-topics/index.html    American pregnancy association - http://americanpregnancy.org    Centering Pregnancy (group prenatal care option): http://centeringhealthcare.org    Information about doulas:  Childbirth collective: http://www.childbirthcollective.org/  Doulas of [place] (EFRA):  www.efra.org  GROUNDBOOTH [facility]  project: http://twincitiesdoulaproject.com/     Early Childhood and Family Education  "(ECFE):  ECFE offers parents hands-on learning experiences that will nourish a lifetime of teachable moments.  http://ecfe.info/ecfe-home/    March of Dimes www.Packetworx     FDA - Nutrition  www.mypyramid.gov  Under \"For Consumers,\" click on \"pregnant and breastfeeding women.\"      Centers for Disease Control and Prevention (CDC) - Vaccines : http://www.cdc.gov/vaccines/       When researching information on the web, question the validity of websites.  The domains .gov, .edu and.org tend to be more reliable information.  If there are a lot of advertisements, be cautious of the information provided. Stay away from blogs and chat rooms please!     Making Plans for Feeding My Baby    By this point, you probably have read a lot about feeding your baby.  Breastfeed or formula? Each parent's decision is their own and [facility] Nurse Midwives [facility] respects you and your choices. We ve gathered information on both breastfeeding and formula feeding to help with your decision. Talking with your nurse-midwife can also help in your decision.  However you plan to feed your baby, [facility] encourage rooming in with your baby, skin-to-skin contact and feeding your baby based on his or her cues.    Skin-to-skin contact  Being close to mom helps your baby adjust to life outside of the womb.  It helps your baby regulate their body temperature, heart rate, and breathing.  Your baby will usually be placed skin-to-skin immediately following birth or as soon as possible, if medical intervention is needed.    Rooming-In  Having your baby stay with you in your room is called  rooming-in .  Keeping your baby in your room helps you to learn how to care for your baby by getting to know your baby s cues, body rhythms and sleep cycle.       Cue-based feeding  Cues (signals) are baby s way of telling you what he or she wants.  When you learn your infant s cues, you know how to care for and feed " your baby.   Feeding cues are the licking and smacking of lips, bringing their fist to their mouth, and a reflex called  rooting - where baby turns and opens his or her mouth, searching for the breast or bottle.  Crying is a late feeding cue.  Babies can feed frequently, often at least 8 times in 24 hours.    Breastfeeding facts  Breast milk is the best source of nutrition for your baby and is available at birth. In the first couple of days, your milk volume is already starting to increase, though it may not be noticeable. Breastfeed frequently to increase your milk supply. Within three to five days, you will begin to notice larger milk volumes. An increase in breast size, heaviness and firmness are often described as the milk  coming in.  Frequent breastfeeding can help breasts from getting overly firm and painful. You will know the baby is getting enough milk if your baby is having wet and dirty diapers and gaining weight.     If your goal is to exclusively breastfeed, it is important to not use any formula or artificial nipples (including bottles and pacifiers) while your baby is learning to breastfeed.  While it may seem like an  easy  option to give your baby a bottle, formula should only be given if there is a medical reason for your baby to have it.      Positioning and attachment   Get comfortable.  Use pillows as needed to support your arms and baby.  Hold baby close at the level of your breast, facing you in a tummy to tummy position.  Skin to skin helps with this.  Position the baby with his or her nose by the nipple.  There should be a straight line from baby s ear to shoulder to hips.  Tickle your baby s lips or wait for baby to open mouth wide, bring baby to breast by leading with the chin.  Aim the nipple at the roof of baby s mouth.  A rapid sucking pattern is followed by longer, drawing pattern with occasional swallows heard.  When baby is correctly latched, your nipple and much of the areola are  pulled well into baby s mouth.      Returning to work or school  Focus on a good start to breastfeeding.  Many women continue to provide breastmilk for their baby when they return to work or school.  Making plans about where to pump and store milk can make the transition go well.  Talk with other mothers who have also returned to work or school for tips and support.  Your employer s Human Resource department may be a resource as well.     Returning to work or school: (continued)   babies can mean fewer  sick  days for you.  A quality breast pump will also save time and add comfort.  Check with your insurance prior to giving birth for breast pump coverage.  Many insurance companies include a pump within your benefits.  Wait until your baby is at least three weeks old to introduce a bottle for the first time.  Have someone besides you give the bottle.  Breastfeed when you are with your baby. Reserve your bottles of breast milk for when you are away.   Your breasts will need to be  emptied  either by your baby or a pump.  Plan to pump at least twice in an eight hour day.  If you cannot pump at work, continue breastfeeding at home. Any amount of breast milk is worth giving to your baby.    Formula feeding facts  If you are planning to use formula to feed your baby, you will want to make some preparations ahead of time. Talk to your doctor or nurse-midwife about what type of formula to use. Some are iron-fortified, meaning they have extra iron in them. You will want to purchase formula and bottles before your baby is born to be sure you are ready after you return from the hospital. [facility] do not provide formula samples to take home.    Be sure to follow formula mixing directions closely. Regular milk in the dairy case at the grocery store should not be given to babies under 1 year old. Baby formula is sold in several forms including:  Ready-to-use. This is the most expensive, but no mixing is  necessary.  Concentrated liquid. This is less expensive than ready-to-use and you mix with water.  Powder. This is the least expensive. You mix one level scoop of powdered formula with two ounces of water and stir well.    Most babies need 2.5 ounces of formula per pound of body weight each day. This means an 8-pound baby may drink about 20 ounces of formula a day; however, this is just an estimate. The most important thing is to pay attention to your baby s cues.  If your baby is always fussy, needs more iron or has certain food allergies, your physician may suggest you change your baby s formula to a different kind.     How do I warm my baby s bottles?  You may feed your baby a bottle without warming it first. It is OK for the breast milk or formula to be cool or room temperature. If your baby seems to prefer it warmed, you can put the filled bottle in a container of warm water and let it stand for a few minutes. Check the temperature of the liquid on your skin before feeding it to your baby; to be sure it isn t too hot. Do not heat bottles in the microwave. Microwaves heat food and liquids unevenly, and this can cause hot spots that can burn your baby.    How do I clean and sterilize bottles?  Sterilize bottles and nipples before you use them for the first time. You can do this by putting them in boiling water for 5 minutes. After that first time, you can wash them in hot and soapy water. Rinse them carefully to be sure there is no soap left on them. You can also wash them in the .    Breastfeeding/Chestfeeding Support  Contact us  Breastfeeding/chestfeeding is the natural and healthy way for parents to feed their babies and provides the best source of nutrition in most cases to infants. Breast milk has health benefits for mom, too. The American Academy of Pediatrics recommends exclusively breastfeeding for 6 months for optimal growth and development and breastfeeding up to at least a year.  Benefits to  baby:  Easy digestion, less diarrhea and constipation, breast milk is easy to digest.  Lots of bonding with parent.  Less likely to have asthma.  Less ear infections and respiratory infections.  Less likely to have Type 2 Diabetes.  Less likely to become obese.  Lower risk of Sudden Infant Death Syndrome (SIDS).  Benefits to breastfeeding/chestfeeding parent:  Helps with weight loss.  Lower risk of ovarian and breast cancer, Type 2 diabetes and heart disease.  /chestfed babies are easy to take on trips. Just grab the diapers and go!  Breastfeeding/chestfeeding saves money (no formula or bottle costs, fewer doctor bills and medication costs).  Ready to breastfeed/chestfeed anywhere, don t need to make and wash bottles.  Breastfeeding/chestfeeding is wonderful, but not always easy. Learning what to expect ahead of time and get support from a lactation professional. Check out the [facility] Breastfeeding Resource Guide for classes, drop in support groups, childbirth education, Doulas and clinic and hospital lactation services.   B    reastfeeding clinic visits with [facility] [place] Nurse Midwives [facility] IBCLC lactation consultants are at [facility] on , [facility] on  and [facility] on . Call to schedule, [phone].    New Parent Connection:   [facility], [address] [facility]  In-person meetings on  from 6 pm - 7:15 pm for parents of  to 9 months, at the same site.   All are free, drop-in, no registration required.    There are also free virtual meetings ongoing on :  11:30 am - 12:30 pm for parents of newborns to 3 months  4:15 pm to 5:15 pm for parents of 3 to 9-month olds  For joining info parents should call Yen Stone at [phone]    [facility] Baby Café  Due to COVID-19, all Baby Café sessions are canceled until further notice. For lactation support, please contact one of our bilingual staff:  Lola (IBCLC)  "[phone]  Jaimie (IBCLC/ [place]er) [phone]  Wil (Hmong) [phone]  Dwayne (Finnish) [phone]  Baby Café is a free, drop-in service offering breastfeeding/chestfeeding support. Come share tips and socialize with other pregnant, breastfeeding/chestfeeding families. Babies and siblings are welcome (no  available).  We offer:  Professionally trained lactation staff.  Resource books for lending.  Relaxed and fun atmosphere.  Refreshments.  Locations  Baby Café is offered at several locations.  Please see below for the Baby Café closest to you.  CANCELED UNTIL FURTHER NOTICE  [facility]  [address] [address]  1st Wednesdays of the month   10 a.m. - Noon  CANCELED UNTIL FURTHER NOTICE  [facility]  [address], [place], [address]  4th Wednesdays of the month   10 a.m. - 12:30 p.m.     CANCELED UNTIL FURTHER NOTICE  [facility]  [address], [place], [address]  4th Wednesdays of the month  4 - 6 p.m.  CANCELED UNTIL FURTHER NOTICE  [facility] [address], [place], [facility]  2nd Thursdays of the month.  9:30-11:30 a.m.  Enter through the east end of the building, the blue Door C.  Ring the ECFE buzzer to be let in.   More information  Jaimie Ballesteros  [phone]  [email or web address].         Virtual Breastfeeding Support:    During this time of isolation, breastfeeding families need even more community!  Here are some area organizations offering virtual support groups for breastfeeding:    [facility] Support Group, Tuesdays at 10:30 am   Run by CHRISTIE Chaney of [employer] [facility]   Go to [facility] Facebook page and click on \"events\" for link   https://www.PIERIS Proteolab.com/events/770359434556707/  [facility] Milk Hour, Thursdays at 2:30 pm    Run by CHRISTIE Treviño   Go to [facility] FB " page and send message to get link   https://www.One World Virtual.com/healthfoundations/  [facility] holding virtual meetings the first Tuesday of each month, 8-9 pm, and the   Third Saturday, 10 - 11 am.  Go to [facility] FB page; message to get link https://www.One World Virtual.Trending Taste/LLLofGoldenRobert/?hc_location=[facility]  [employer] offers a Lactation Lounge every Friday 12pm - 1pm, run by Marianne Holder [facility] Leader   Sign up via link at Lalina/cbe-lactation   https://www.Lalina/cbe-lactation  [facility] is offering virtual support groups every Monday, 10:30 am - 12 pm, run by nurse CHRISTIE   Https://www.One World Virtual.com/events/[email or web address]/    Prenatal Breastfeeding Classes:      [employer] is offering virtual breastfeeding and  care classes:  https://www.Lalina/education-workshops  BirthEd childbirth and breastfeeding education offering virtual prenatal breastfeeding classes  Https://www.Chromatik.Trending Taste/workshops      Preparing for your baby:   [place] Screening Program  Http://www.health.[facility].mn.us/newbornscreening/  [place] newborns are tested soon after birth for more than 50 hidden, rare disorders, including hearing loss and critical congenital heart disease (CCHD). This site provides resources and information for families and providers.    When to call:   Appointment line and to get a hold of CNM in clinic Monday-Friday 8 am - 5 pm:  [phone].  There are some clinics with early start times (1st appointment 7:40 am) and others with evening hours (last appointment 6:20 pm).  Most are typically open from 8 am to 5 pm.    CNM on call answering service: [phone].  Specify your hospital of choice and leave a brief message for CNM;  will then page CNM who is on call at your specified hospital and you should receive a call back with 15 minutes.  Be sure that your ringer is audible and that you can accept  blocked calls so that we can get back in touch with you! This number should be reserved for urgent needs if during the day, before 8 am, after 5 pm, weekends, holidays.    Contact the on-call CNM with warning signs, such as:  vaginal bleeding   Vaginal discharge and itching or pain and burning during urination  Leg/calf pain or swelling on one side  severe abdominal pain  nausea and vomiting more than 4-5 times a day, or if you are unable to keep anything down  fever more than 100.4 degrees F.     Make plans for transportation and  as needed for when you are going to the hospital.    Ask your health care provider about vaccinations you may need following delivery. By now, you should have received a Tdap immunization to protect against pertussis or whooping cough. Fathers and family members who will be in close contact with the baby should also receive a Tdap shot at least two weeks before the expected birth of the baby if they have not had a Td (tetanus) shot for at least two years.    Your midwife may offer to check your cervix for changes. If you are past your due date, discuss the next steps leading to delivery with your midwife. If you don't start labor on your own by 41 or 42 weeks, your midwife may recommend giving you medicines to ripen your cervix and start labor.  Induction of labor: http://onlinelibrary.dubois.com/store/10.1016/j.jmwh.2008.04.018/asset/j.jmwh.2008.04.018.pdf?v=1&t=xvwu5xxw&z=[email or web address]    Tell your midwife or physician how you plan to feed your baby (breast or bottle), who you have chosen to do pediatric care for your baby, and if you have a boy, whether you have chosen to have him circumcised. You will need a car seat correctly installed in your vehicle to bring your baby home. As you start to set up the nursery at home for your baby, make sure the crib is safe. The mattress needs to fit snugly against the edges of the crib. If you can fit a soda can  between the bars, they are too far apart and can allow the baby's head to caught between them.    Learn about infant care and feeding, including information about infant CPR. We recommend that you put your baby to sleep on his or her back to reduce the chance of Sudden Infant Death Syndrome (SIDS). To maintain a healthy environment in which your child can grow, it's best to keep your home smoke-free. By preparing ahead, your transition into parenthood will go smoothly for you and your baby.    Your midwife will want to see you for a checkup 2 to 6 weeks after delivery.

## 2023-04-24 NOTE — PROGRESS NOTES
"Mirian presents to clinic with Dewayne at 39w2d for routine prenatal    Feeling very \"done\" with pregnancy. Has been feeling more low pressure, shooting nerve pain. Denies signs/sx labor. Reviewed blood sugar log sent to  edu, overall look WNL. Mirian doesn't think she has pre-registered at hospital - website provided and encouraged preregistration. Reviewed expectations for hospital stay & duration, care management of GDM infants. [facility] & [facility]. Requested VE today, confirmed cephalic with handheld ultrasound.    Patient was seen with student, GOYO Moreland who was present for learning. I personally assessed, examined and made clinical decisions reflected in the documentation.     "

## 2023-04-24 NOTE — TELEPHONE ENCOUNTER
Gestational Diabetes Follow-up    Subjective/Objective:    Mirian Cordova sent in blood glucose log for review. Last date of communication was: 4/10/23.    Gestational diabetes is being managed with diet and activity    Taking diabetes medications: no       Estimated Date of Delivery: Apr 29, 2023    BG/Food Log:         Assessment:    Ketones: trace.   Fasting blood glucoses: 86% in target.  After breakfast: 93% in target.  Before lunch: -% in target.  After lunch: 100% in target.  Before dinner: -% in target.  After dinner: 83% in target.    Plan/Response:  Hi Mirian,    Your numbers look good! It looks like you will be delivering any day now.  You do not need to send your numbers again, but continue testing 4 times a day.   Let us know if you have any questions or concerns.     Serenity DIXONN, RN, PHN, CDCES       Any diabetes medication dose changes were made via the CDE Protocol and Collaborative Practice Agreement with the patient's referring provider. A copy of this encounter was shared with the provider.

## 2023-05-01 ENCOUNTER — PRENATAL OFFICE VISIT (OUTPATIENT)
Dept: MIDWIFE SERVICES | Facility: CLINIC | Age: 28
End: 2023-05-01
Payer: COMMERCIAL

## 2023-05-01 VITALS
SYSTOLIC BLOOD PRESSURE: 104 MMHG | HEIGHT: 63 IN | HEART RATE: 92 BPM | DIASTOLIC BLOOD PRESSURE: 70 MMHG | WEIGHT: 199.5 LBS | BODY MASS INDEX: 35.35 KG/M2

## 2023-05-01 DIAGNOSIS — O36.8130 DECREASED FETAL MOVEMENTS IN THIRD TRIMESTER, SINGLE OR UNSPECIFIED FETUS: ICD-10-CM

## 2023-05-01 DIAGNOSIS — Z34.80 SUPERVISION OF OTHER NORMAL PREGNANCY, ANTEPARTUM: Primary | ICD-10-CM

## 2023-05-01 DIAGNOSIS — Z36.89 NST (NON-STRESS TEST) REACTIVE: ICD-10-CM

## 2023-05-01 DIAGNOSIS — O26.899 RH NEGATIVE STATE IN ANTEPARTUM PERIOD: ICD-10-CM

## 2023-05-01 DIAGNOSIS — O24.410 DIET CONTROLLED GESTATIONAL DIABETES MELLITUS (GDM) IN THIRD TRIMESTER: ICD-10-CM

## 2023-05-01 DIAGNOSIS — Z67.91 RH NEGATIVE STATE IN ANTEPARTUM PERIOD: ICD-10-CM

## 2023-05-01 PROCEDURE — 99207 PR FIRST OB VISIT: CPT | Performed by: ADVANCED PRACTICE MIDWIFE

## 2023-05-01 PROCEDURE — 59426 ANTEPARTUM CARE ONLY: CPT | Performed by: ADVANCED PRACTICE MIDWIFE

## 2023-05-01 PROCEDURE — 99213 OFFICE O/P EST LOW 20 MIN: CPT | Mod: 25 | Performed by: ADVANCED PRACTICE MIDWIFE

## 2023-05-01 NOTE — PROGRESS NOTES
"Mirian is a  at 40w2d who presents to clinic today for a routine prenatal visit.      Exam:  see prenatal flowsheet    The following topics were covered in today's visit:  1. Gestational diabetes - reports fasting and 1 hour post-prandial blood sugars have been WNL.  2. Discussed options for term gestation pregnancies with diet controlled gestational diabetes. Talked about options of expectant management vs IOL. Reviewed [facility] recommendation for IOL between 39.0-40.6 wks for women with diet controlled gest diabetes. Also discussed about Mirian's unripe cervix, and how an unripe cervix will likely mean 24-48 hours of cervical ripening prior to pitocin IOL.  After taking above info into consideration and asking questions Mriian chooses to proceed with IOL now.  Shares she has been considering IOL for a number of weeks now, and feels \"done\" being pregnant.  Would like to be able to finish some work tasks tonight and tomorrow morning, but would be ready to proceed with IOL anytime tomorrow afternoon or the following day.  CNM contacted [facility] and set up IOL for 23 at 3PM.  Plan is to proceed with admission and cervical ripening.     3. Reports baby is moving, but a little less than normal the past day.  Will do NST in clinic today to evaluate fetal well-being further.   4. Reviewed CNM on-call number for any questions overnight or tomorrow morning prior to IOL.  Also reviewed need to call with s/s of labor, srom, decreased fetal movement, bleeding, or other warning signs.      PROCEDURE IN CLINIC TODAY:    NST completed in clinic today:    Baseline , mod variability, accels present, no decels.    Irregular, mild contractions    Reactive NST    "

## 2023-05-02 ENCOUNTER — HOSPITAL ENCOUNTER (INPATIENT)
Facility: CLINIC | Age: 28
LOS: 3 days | Discharge: HOME | End: 2023-05-05
Attending: ADVANCED PRACTICE MIDWIFE | Admitting: ADVANCED PRACTICE MIDWIFE
Payer: COMMERCIAL

## 2023-05-02 DIAGNOSIS — Z37.9 VACUUM-ASSISTED VAGINAL DELIVERY: Primary | ICD-10-CM

## 2023-05-02 PROBLEM — Z34.90 ENCOUNTER FOR INDUCTION OF LABOR: Status: ACTIVE | Noted: 2023-05-02

## 2023-05-02 LAB
ABO/RH(D): ABNORMAL
ANTIBODY ID: NORMAL
ANTIBODY SCREEN: POSITIVE
ERYTHROCYTE [DISTWIDTH] IN BLOOD BY AUTOMATED COUNT: 13.4 % (ref 10–15)
GLUCOSE BLDC GLUCOMTR-MCNC: 89 MG/DL (ref 70–99)
HBA1C MFR BLD: 5.1 %
HCT VFR BLD AUTO: 39.4 % (ref 35–47)
HGB BLD-MCNC: 13 G/DL (ref 11.7–15.7)
HOLD SPECIMEN: NORMAL
MCH RBC QN AUTO: 29.2 PG (ref 26.5–33)
MCHC RBC AUTO-ENTMCNC: 33 G/DL (ref 31.5–36.5)
MCV RBC AUTO: 89 FL (ref 78–100)
PLATELET # BLD AUTO: 293 10E3/UL (ref 150–450)
RBC # BLD AUTO: 4.45 10E6/UL (ref 3.8–5.2)
SPECIMEN EXPIRATION DATE: ABNORMAL
SPECIMEN EXPIRATION DATE: NORMAL
WBC # BLD AUTO: 9.1 10E3/UL (ref 4–11)

## 2023-05-02 PROCEDURE — 250N000013 HC RX MED GY IP 250 OP 250 PS 637: Performed by: ADVANCED PRACTICE MIDWIFE

## 2023-05-02 PROCEDURE — 120N000001 HC R&B MED SURG/OB

## 2023-05-02 PROCEDURE — 85041 AUTOMATED RBC COUNT: CPT | Performed by: ADVANCED PRACTICE MIDWIFE

## 2023-05-02 PROCEDURE — 86780 TREPONEMA PALLIDUM: CPT | Performed by: ADVANCED PRACTICE MIDWIFE

## 2023-05-02 PROCEDURE — 83036 HEMOGLOBIN GLYCOSYLATED A1C: CPT | Performed by: ADVANCED PRACTICE MIDWIFE

## 2023-05-02 PROCEDURE — 99221 1ST HOSP IP/OBS SF/LOW 40: CPT | Performed by: ADVANCED PRACTICE MIDWIFE

## 2023-05-02 PROCEDURE — 86870 RBC ANTIBODY IDENTIFICATION: CPT | Performed by: ADVANCED PRACTICE MIDWIFE

## 2023-05-02 PROCEDURE — 86901 BLOOD TYPING SEROLOGIC RH(D): CPT | Performed by: ADVANCED PRACTICE MIDWIFE

## 2023-05-02 PROCEDURE — 36415 COLL VENOUS BLD VENIPUNCTURE: CPT | Performed by: ADVANCED PRACTICE MIDWIFE

## 2023-05-02 RX ORDER — PROCHLORPERAZINE 25 MG
25 SUPPOSITORY, RECTAL RECTAL EVERY 12 HOURS PRN
Status: DISCONTINUED | OUTPATIENT
Start: 2023-05-02 | End: 2023-05-04 | Stop reason: HOSPADM

## 2023-05-02 RX ORDER — OXYTOCIN 10 [USP'U]/ML
10 INJECTION, SOLUTION INTRAMUSCULAR; INTRAVENOUS
Status: DISCONTINUED | OUTPATIENT
Start: 2023-05-02 | End: 2023-05-04 | Stop reason: HOSPADM

## 2023-05-02 RX ORDER — NALOXONE HYDROCHLORIDE 0.4 MG/ML
0.4 INJECTION, SOLUTION INTRAMUSCULAR; INTRAVENOUS; SUBCUTANEOUS
Status: DISCONTINUED | OUTPATIENT
Start: 2023-05-02 | End: 2023-05-04 | Stop reason: HOSPADM

## 2023-05-02 RX ORDER — DEXTROSE MONOHYDRATE 25 G/50ML
25-50 INJECTION, SOLUTION INTRAVENOUS
Status: DISCONTINUED | OUTPATIENT
Start: 2023-05-02 | End: 2023-05-04 | Stop reason: HOSPADM

## 2023-05-02 RX ORDER — METOCLOPRAMIDE 10 MG/1
10 TABLET ORAL EVERY 6 HOURS PRN
Status: DISCONTINUED | OUTPATIENT
Start: 2023-05-02 | End: 2023-05-04 | Stop reason: HOSPADM

## 2023-05-02 RX ORDER — CITRIC ACID/SODIUM CITRATE 334-500MG
30 SOLUTION, ORAL ORAL
Status: DISCONTINUED | OUTPATIENT
Start: 2023-05-02 | End: 2023-05-04 | Stop reason: HOSPADM

## 2023-05-02 RX ORDER — ONDANSETRON 4 MG/1
4 TABLET, ORALLY DISINTEGRATING ORAL EVERY 6 HOURS PRN
Status: DISCONTINUED | OUTPATIENT
Start: 2023-05-02 | End: 2023-05-04 | Stop reason: HOSPADM

## 2023-05-02 RX ORDER — OXYTOCIN/0.9 % SODIUM CHLORIDE 30/500 ML
340 PLASTIC BAG, INJECTION (ML) INTRAVENOUS CONTINUOUS PRN
Status: COMPLETED | OUTPATIENT
Start: 2023-05-02 | End: 2023-05-04

## 2023-05-02 RX ORDER — PROCHLORPERAZINE MALEATE 10 MG
10 TABLET ORAL EVERY 6 HOURS PRN
Status: DISCONTINUED | OUTPATIENT
Start: 2023-05-02 | End: 2023-05-04 | Stop reason: HOSPADM

## 2023-05-02 RX ORDER — KETOROLAC TROMETHAMINE 30 MG/ML
30 INJECTION, SOLUTION INTRAMUSCULAR; INTRAVENOUS
Status: DISCONTINUED | OUTPATIENT
Start: 2023-05-02 | End: 2023-05-05 | Stop reason: HOSPADM

## 2023-05-02 RX ORDER — NALOXONE HYDROCHLORIDE 0.4 MG/ML
0.2 INJECTION, SOLUTION INTRAMUSCULAR; INTRAVENOUS; SUBCUTANEOUS
Status: DISCONTINUED | OUTPATIENT
Start: 2023-05-02 | End: 2023-05-04 | Stop reason: HOSPADM

## 2023-05-02 RX ORDER — CARBOPROST TROMETHAMINE 250 UG/ML
250 INJECTION, SOLUTION INTRAMUSCULAR
Status: DISCONTINUED | OUTPATIENT
Start: 2023-05-02 | End: 2023-05-04 | Stop reason: HOSPADM

## 2023-05-02 RX ORDER — MISOPROSTOL 200 UG/1
800 TABLET ORAL
Status: DISCONTINUED | OUTPATIENT
Start: 2023-05-02 | End: 2023-05-04 | Stop reason: HOSPADM

## 2023-05-02 RX ORDER — OXYTOCIN/0.9 % SODIUM CHLORIDE 30/500 ML
100-340 PLASTIC BAG, INJECTION (ML) INTRAVENOUS CONTINUOUS PRN
Status: DISCONTINUED | OUTPATIENT
Start: 2023-05-02 | End: 2023-05-05 | Stop reason: HOSPADM

## 2023-05-02 RX ORDER — METOCLOPRAMIDE HYDROCHLORIDE 5 MG/ML
10 INJECTION INTRAMUSCULAR; INTRAVENOUS EVERY 6 HOURS PRN
Status: DISCONTINUED | OUTPATIENT
Start: 2023-05-02 | End: 2023-05-04 | Stop reason: HOSPADM

## 2023-05-02 RX ORDER — MORPHINE SULFATE 10 MG/ML
10 INJECTION, SOLUTION INTRAMUSCULAR; INTRAVENOUS
Status: DISCONTINUED | OUTPATIENT
Start: 2023-05-02 | End: 2023-05-04 | Stop reason: HOSPADM

## 2023-05-02 RX ORDER — FENTANYL CITRATE 50 UG/ML
50 INJECTION, SOLUTION INTRAMUSCULAR; INTRAVENOUS EVERY 30 MIN PRN
Status: DISCONTINUED | OUTPATIENT
Start: 2023-05-02 | End: 2023-05-04 | Stop reason: HOSPADM

## 2023-05-02 RX ORDER — MISOPROSTOL 200 UG/1
400 TABLET ORAL
Status: DISCONTINUED | OUTPATIENT
Start: 2023-05-02 | End: 2023-05-04 | Stop reason: HOSPADM

## 2023-05-02 RX ORDER — METHYLERGONOVINE MALEATE 0.2 MG/ML
200 INJECTION INTRAVENOUS
Status: DISCONTINUED | OUTPATIENT
Start: 2023-05-02 | End: 2023-05-04 | Stop reason: HOSPADM

## 2023-05-02 RX ORDER — MISOPROSTOL 100 UG/1
25 TABLET ORAL
Status: DISCONTINUED | OUTPATIENT
Start: 2023-05-02 | End: 2023-05-04 | Stop reason: HOSPADM

## 2023-05-02 RX ORDER — ONDANSETRON 2 MG/ML
4 INJECTION INTRAMUSCULAR; INTRAVENOUS EVERY 6 HOURS PRN
Status: DISCONTINUED | OUTPATIENT
Start: 2023-05-02 | End: 2023-05-04 | Stop reason: HOSPADM

## 2023-05-02 RX ORDER — NICOTINE POLACRILEX 4 MG
15-30 LOZENGE BUCCAL
Status: DISCONTINUED | OUTPATIENT
Start: 2023-05-02 | End: 2023-05-04 | Stop reason: HOSPADM

## 2023-05-02 RX ORDER — OXYTOCIN 10 [USP'U]/ML
10 INJECTION, SOLUTION INTRAMUSCULAR; INTRAVENOUS
Status: DISCONTINUED | OUTPATIENT
Start: 2023-05-02 | End: 2023-05-05 | Stop reason: HOSPADM

## 2023-05-02 RX ORDER — HYDROXYZINE HYDROCHLORIDE 25 MG/1
50 TABLET, FILM COATED ORAL
Status: DISCONTINUED | OUTPATIENT
Start: 2023-05-02 | End: 2023-05-04 | Stop reason: HOSPADM

## 2023-05-02 RX ORDER — IBUPROFEN 800 MG/1
800 TABLET, FILM COATED ORAL
Status: DISCONTINUED | OUTPATIENT
Start: 2023-05-02 | End: 2023-05-05 | Stop reason: HOSPADM

## 2023-05-02 RX ADMIN — Medication 25 MCG: at 16:49

## 2023-05-02 RX ADMIN — Medication 25 MCG: at 22:31

## 2023-05-02 RX ADMIN — Medication 25 MCG: at 20:21

## 2023-05-02 RX ADMIN — Medication 25 MCG: at 18:50

## 2023-05-02 ASSESSMENT — ACTIVITIES OF DAILY LIVING (ADL)
WALKING_OR_CLIMBING_STAIRS_DIFFICULTY: NO
WEAR_GLASSES_OR_BLIND: NO
FALL_HISTORY_WITHIN_LAST_SIX_MONTHS: NO
DIFFICULTY_EATING/SWALLOWING: NO
ADLS_ACUITY_SCORE: 18
ADLS_ACUITY_SCORE: 18
CONCENTRATING,_REMEMBERING_OR_MAKING_DECISIONS_DIFFICULTY: NO
CHANGE_IN_FUNCTIONAL_STATUS_SINCE_ONSET_OF_CURRENT_ILLNESS/INJURY: NO
ADLS_ACUITY_SCORE: 18
DOING_ERRANDS_INDEPENDENTLY_DIFFICULTY: NO
ADLS_ACUITY_SCORE: 18
TOILETING_ISSUES: NO
DRESSING/BATHING_DIFFICULTY: NO

## 2023-05-02 NOTE — PATIENT INSTRUCTIONS
"\"We hope you had a positive experience and that you can definitely recommend [facility] to your family and friends. You ll be receiving a survey soon and we look forward to hearing your feedback\".    [facility] Nurse Midwives - Contact information:  Appointment line and to get a hold of CNM in clinic Monday-Friday 8 am - 5 pm:  [phone].  There are some clinics with early start times (1st appointment 7:40 am) and others with evening hours (last appointment 6:20 pm).  Most are typically open from 8 am to 5 pm.    CNM on call answering service: [phone].  Specify your hospital of choice and leave a brief message for CNM;  will then page CNM who is on call at your specified hospital and you should receive a call back with 15 minutes.  Be sure that your ringer is audible and that you can accept blocked calls so that we can get back in touch with you! This number should be reserved for urgent needs if during the day, before 8 am, after 5 pm, weekends, holidays.    Contact the on-call CNM with warning signs, such as:  vaginal bleeding   Vaginal discharge and itching or pain and burning during urination  Leg/calf pain or swelling on one side  severe abdominal pain  nausea and vomiting more than 4-5 times a day, or if you are unable to keep anything down  fever more than 100.4 degrees F.     TPACKshaet  After each of your visits you are welcome to check [employer] for your visit summary including education and links to information relevant to your pregnancy and/or well woman care.   Find the \"Visits\" tab at the top of the page, you will see a list of recent visits and for each visit a for link for \"View After Visit Summary.\" View of your After Visit Summary will allow you to read our recommendations from your visit, review any education provided, and link to websites with useful information.   If you have any questions or difficulty navigating [employer], please feel free to contact us and we will " "do our best to direct you.    Meet the Midwives from [facility]  You are invited to an informational meet and greet with [facility] Certified Nurse-Midwives. Our free \"Meet the Midwives\" event is a great opportunity to learn about our midwives' philosophy and experience, the hospitals where we can assist with your birth, and answer questions you may have. Partners, friends, and family are welcome to attend. Currently, this is a virtual event.  Dates: Last Thursday of every month at 7 pm.    Link to next (live) meeting  [email or web address]  To Join by Telephone (audio only) Call:   [phone] Phone Conference ID: [phone] #      Touring the Maternity Care Center  At this time we are offering a virtual tour of the Maternity Care Centers at both [facility] and [facility]:   [facility] Maternity Care:   https://[facility].org/locations/the-birthplace-at-[facility] Maternity Care:   https://[facility].org/locations/the-birthplace-at-[facility]    Scroll to the bottom of this hyperlink if the above link does not work      Childbirth and Parenting Education:     Everyday Miracles:   https://www.everyday-miracles.org/    Free Video Series from [facility]: https://nursing.[facility]/academics/specialty-areas/nurse-midwifery/having-baby-prenatal-videos/having-baby-prenatal-and    Childbirth Education virtual (live) classes: www.New York [employer]s/classes  The Birth Hour: https://The Learning ExperienceAcademybirthAppPowerGroup.digedu/online-childbirth-class/  BirthED: https://www.birthedmn.com/  MIKE parenting center: http://ammaparentingthesweetlink.digedu/   (048) 356-HHEF  Blooma: (education, yoga & wellness) www.Astonish Results  Enlightened Mama: www.Mirubeeightenedmama.com   Childbirth collective: (Parent topic nights)  www.childbirthcollective.org/  Hypnobabies:  " www.SocialwarebiestWeavlyties.Tacit Software/  Hypnobirthing:  Http://hypnUle.Tacit Software/  Hypnobirthing virtual class: www.Sparksfly Technologies.Tacit Software/hypnobirthing    Information about doulas:  Childbirth collective: http://www.childbirthcollective.org/  Doulas of [place] (EFRA):  www.efra.org  Hy-Drive  project: http://Crowd TechnologiestiesdoPalmject.Tacit Software/     Early Childhood and Family Education (ECFE):  ECFE offers parents hands-on learning experiences that will nourish a lifetime of teachable moments.  http://ecfe.info/ecfe-home/    APPS and Podcasts:   Ryne Adams Nurture    Evidence Based Birth  The Birth Hour (for birth stories)   Birthful   Expectful   The Longest Shortest Time  PregnancyPodcast Yelitza Person    Book Recommendations:   Patsy Decatur's Birthing From [facility]--first few chapters include a new-age tone, you may prefer to skip it and keep going, because there is good stuff later.  This book recommendation covers emotional preparation, but does cover coping with pain, and use of both pharmacological and nonpharmacological methods.    Dr. Evans' The Pregnancy Book and The Birth Book--the pregnancy book goes month-by month    The Birth Partner by Sushma López    Womanly Art of Breastfeeding by La Leche League International   Bestfeeding by Hannah Smiley--great pictures    Mothering Your Nursing Toddler, by Eloise Momin.   Addresses dealing with so many of the challenging behaviors of a nursing toddler.  How Weaning Happens, by La Leche League.  Discusses weaning at all ages, from medically necessary weaning of an infant, all the way up to age 5 (or older), with why/why not, and strategies.  Very empowering book both for deciding to wean and deciding not to.    American College of Nurse-Midwives (ACNM) http://www.midwife.org/; look at the informational handouts at http://www.midwife.org/Share-With-Women     www.mymidwife.org    Mother to Baby (Medication and Herbal guidance in pregnancy):  "http://www.mothertobaby.org  Toll-Free Hotline: 187.432.6126  LactMed (Medication use while breastfeeding): http://toxnet.nlm.nih.gov/newtoxnet/lactmed.htm    Women's Health.gov:  http://www.womenshealth.gov/a-z-topics/index.html    American pregnancy association - http://americanpregnancy.org    Centering Pregnancy (group prenatal care option): http://centeringhealthcare.org    March of Dimes www.Inside Warehouse     FDA - Nutrition  www.mypyramid.gov  Under \"For Consumers,\" click on \"pregnant and breastfeeding women.\"      Centers for Disease Control and Prevention (CDC) - Vaccines : http://www.cdc.gov/vaccines/       When researching information on the web, question the validity of websites.  The ViaCLIX .gov, Montrue Technologies andDocSendorg tend to be more reliable information.  If there are a lot of advertisements, be cautious of the information provided. Stay away from blogs and chat rooms please!     Making Plans for Feeding My Baby    By this point, you probably have read a lot about feeding your baby.  Breastfeed or formula? Each parent's decision is their own and [facility] Nurse [facility] respects you and your choices. We ve gathered information on both breastfeeding and formula feeding to help with your decision. Talking with your nurse-midwife can also help in your decision.  However you plan to feed your baby, [facility] encourage rooming in with your baby, skin-to-skin contact and feeding your baby based on his or her cues.    Skin-to-skin contact  Being close to mom helps your baby adjust to life outside of the womb.  It helps your baby regulate their body temperature, heart rate, and breathing.  Your baby will usually be placed skin-to-skin immediately following birth or as soon as possible, if medical intervention is needed.    Rooming-In  Having your baby stay with you in your room is called  rooming-in .  Keeping your baby in your room helps you to learn how to care for " your baby by getting to know your baby s cues, body rhythms and sleep cycle.       Cue-based feeding  Cues (signals) are baby s way of telling you what he or she wants.  When you learn your infant s cues, you know how to care for and feed your baby.   Feeding cues are the licking and smacking of lips, bringing their fist to their mouth, and a reflex called  rooting - where baby turns and opens his or her mouth, searching for the breast or bottle.  Crying is a late feeding cue.  Babies can feed frequently, often at least 8 times in 24 hours.    Breastfeeding facts  Breast milk is the best source of nutrition for your baby and is available at birth. In the first couple of days, your milk volume is already starting to increase, though it may not be noticeable. Breastfeed frequently to increase your milk supply. Within three to five days, you will begin to notice larger milk volumes. An increase in breast size, heaviness and firmness are often described as the milk  coming in.  Frequent breastfeeding can help breasts from getting overly firm and painful. You will know the baby is getting enough milk if your baby is having wet and dirty diapers and gaining weight.     If your goal is to exclusively breastfeed, it is important to not use any formula or artificial nipples (including bottles and pacifiers) while your baby is learning to breastfeed.  While it may seem like an  easy  option to give your baby a bottle, formula should only be given if there is a medical reason for your baby to have it.      Positioning and attachment   Get comfortable.  Use pillows as needed to support your arms and baby.  Hold baby close at the level of your breast, facing you in a tummy to tummy position.  Skin to skin helps with this.  Position the baby with his or her nose by the nipple.  There should be a straight line from baby s ear to shoulder to hips.  Tickle your baby s lips or wait for baby to open mouth wide, bring baby to breast by  leading with the chin.  Aim the nipple at the roof of baby s mouth.  A rapid sucking pattern is followed by longer, drawing pattern with occasional swallows heard.  When baby is correctly latched, your nipple and much of the areola are pulled well into baby s mouth.      Returning to work or school  Focus on a good start to breastfeeding.  Many women continue to provide breastmilk for their baby when they return to work or school.  Making plans about where to pump and store milk can make the transition go well.  Talk with other mothers who have also returned to work or school for tips and support.  Your employer s Human Resource department may be a resource as well.     Returning to work or school: (continued)   babies can mean fewer  sick  days for you.  A quality breast pump will also save time and add comfort.  Check with your insurance prior to giving birth for breast pump coverage.  Many insurance companies include a pump within your benefits.  Wait until your baby is at least three weeks old to introduce a bottle for the first time.  Have someone besides you give the bottle.  Breastfeed when you are with your baby. Reserve your bottles of breast milk for when you are away.   Your breasts will need to be  emptied  either by your baby or a pump.  Plan to pump at least twice in an eight hour day.  If you cannot pump at work, continue breastfeeding at home. Any amount of breast milk is worth giving to your baby.    Formula feeding facts  If you are planning to use formula to feed your baby, you will want to make some preparations ahead of time. Talk to your doctor or nurse-midwife about what type of formula to use. Some are iron-fortified, meaning they have extra iron in them. You will want to purchase formula and bottles before your baby is born to be sure you are ready after you return from the hospital. The [facility] do not provide formula samples to take home.    Be sure to follow formula  mixing directions closely. Regular milk in the dairy case at the grocery store should not be given to babies under 1 year old. Baby formula is sold in several forms including:  Ready-to-use. This is the most expensive, but no mixing is necessary.  Concentrated liquid. This is less expensive than ready-to-use and you mix with water.  Powder. This is the least expensive. You mix one level scoop of powdered formula with two ounces of water and stir well.    Most babies need 2.5 ounces of formula per pound of body weight each day. This means an 8-pound baby may drink about 20 ounces of formula a day; however, this is just an estimate. The most important thing is to pay attention to your baby s cues.  If your baby is always fussy, needs more iron or has certain food allergies, your physician may suggest you change your baby s formula to a different kind.     How do I warm my baby s bottles?  You may feed your baby a bottle without warming it first. It is OK for the breast milk or formula to be cool or room temperature. If your baby seems to prefer it warmed, you can put the filled bottle in a container of warm water and let it stand for a few minutes. Check the temperature of the liquid on your skin before feeding it to your baby; to be sure it isn t too hot. Do not heat bottles in the microwave. Microwaves heat food and liquids unevenly, and this can cause hot spots that can burn your baby.    How do I clean and sterilize bottles?  Sterilize bottles and nipples before you use them for the first time. You can do this by putting them in boiling water for 5 minutes. After that first time, you can wash them in hot and soapy water. Rinse them carefully to be sure there is no soap left on them. You can also wash them in the .    Breastfeeding/Chestfeeding Support  Contact us  Breastfeeding/chestfeeding is the natural and healthy way for parents to feed their babies and provides the best source of nutrition in most  cases to infants. Breast milk has health benefits for mom, too. The American Academy of Pediatrics recommends exclusively breastfeeding for 6 months for optimal growth and development and breastfeeding up to at least a year.  Benefits to baby:  Easy digestion, less diarrhea and constipation, breast milk is easy to digest.  Lots of bonding with parent.  Less likely to have asthma.  Less ear infections and respiratory infections.  Less likely to have Type 2 Diabetes.  Less likely to become obese.  Lower risk of Sudden Infant Death Syndrome (SIDS).  Benefits to breastfeeding/chestfeeding parent:  Helps with weight loss.  Lower risk of ovarian and breast cancer, Type 2 diabetes and heart disease.  /chestfed babies are easy to take on trips. Just grab the diapers and go!  Breastfeeding/chestfeeding saves money (no formula or bottle costs, fewer doctor bills and medication costs).  Ready to breastfeed/chestfeed anywhere, don t need to make and wash bottles.  Breastfeeding/chestfeeding is wonderful, but not always easy. Learning what to expect ahead of time and get support from a lactation professional. Check out the [facility] Breastfeeding Resource Guide for classes, drop in support groups, childbirth education, Doulas and clinic and hospital lactation services.     Early Childhood Family Education [place] [address] provides a free, drop-in class/breastfeeding support group, facilitated by a lactation consultant and .  During the group you can connect with other parents, weigh your baby, ask questions about feeding and baby development, and more.  You do not need to register.  Fall in-person meetings will begin on September 12, are for parents of babies from birth to 9 months, and will meet on Monday evenings from 6 - 7:15 pm in [facility], which is at [address] [place] [address].  [place] [address] also offers virtual group meetings with a lactation consultant/.   "These take place on , from 11:30 am - 12:30 pm for parents of newborns to 3-month-olds, and from 4:15 - 5:15 for parents of 3 - 9 - month olds.  To get the meeting link contact Yen Stone at [phone].    [facility] offers a free, drop-in breastfeeding support group facilitated by CHRISTIE Pete.   at [place] [facility] [address], [place].  A scale is available to check baby weights, if desired.  [place] also has a variety of new parent classes:  (cost for registration)  https://Nepris/classes/    [facility] Lactation Lounge facilitated by CHRISTIE Grimm:  Free, drop-in support group for breastfeeding, with baby scale available if needed.  Meets at [facility], second Tuesday of each month, 10 am - 12 pm.  Text [phone] for info.    Lat Cafe Support Group,  at 10:30 am   Run by CHRISTIE Chaney of The Baby Whisperer Lactation Consultants   Go to The Baby Whisperer Lactation Consultants Facebook page, click on \"events\" for link:   Https://www.facebook.com/events/[email or web address]/    [facility] [place] breastfeeding support community:  free, drop-in breastfeeding support facilitated by Certified Lactation Counselors, open  and  from 1 pm - 5 pm.  In [place]:  Guiding [facility]:   guidingWyoming Medical Center - [facility].org    [facility] Milk Hour,  at 2:30 pm    Run by CHRISTIE Treviño  Go to [facility] FB page and send message to get link   Https://www.LuxVue Technology.com/healthfoundations/    Manish Bryant:  http://www.[email or web address]/    [facility] offers a Lactation Lounge every Friday 12pm - 1pm, run by Manish Hill Leader.  Sign up via link at MessageMe/cbe-lactation   https://www.MessageMe/cbe-lactation    [facility] is offering virtual support groups every Monday, 10:30 am - 12 pm, run by RN " IBCLC: Https://www.facebook.com/events/[email or web address]/    Culturally-Specific Breastfeeding Support:     For Hmong Families:   The Hmong Breastfeeding Coalition is a wonderful support for [place] Hmong women who are breastfeeding.  They are best found on Facebook.    for Black families:    AptDecocolate Milk Club:  http://www.SpinPunch/chocolate-milk-club/  Email: Marcelino@AltraTech    R.O.S.E. = Reaching our Sisters Everywhere  Http://www.breastfeedingrose.org/    Club Mom breastfeeding support for Black mothers:  Contact Hoda Brady  Phone: [phone]   Email:  Teresa@[email or web address].     Alta Morley  Phone: [phone]   Email:  [email or web address].    Club Dad parent support for Black fathers:   Contact Fabián Lr   Phone: [phone]   Email:  [email or web address].    For Native/Indigenous Families:    https://www.Power Vision.com/groups/nitamising.gimashkikinaan     Additional Resources:  La Leche League is an international, nonprofit, nonsectarian organization offering information, education, and support to mothers who want to breast-feed their babies. Local groups offer phone help and monthly meetings. Visit [employer]org or Encubate [employer] Consulting.org and us the  Find local support  drop down menu or click on the  Resources  tab.    [place] Breastfeeding Resources: 7-970-789-BABY (2229) toll free    National Breastfeeding Help Line trained breastfeeding peer counselors can help answer common breast-feeding questions by phone. Monday-Friday: English/Trinidadian  2-920- 203-4607 toll free, 1-828.203.1342 (CWY)    Virtual Breastfeeding Support:    During this time of isolation, breastfeeding families need even more community!  Here are some area organizations offering virtual support groups for breastfeeding:    [facility] Cafe Support Group, Tuesdays at 10:30 am   Run by CHRISTIE Chaney of [facility] Lactation Consultants   Go to The Baby  "[place]annessa Lactation Consultants Facebook page and click on \"events\" for link   https://www.True Style.com/events/036433195360544/  [facility] Milk Hour,  at 2:30 pm    Run by KEANU TreviñoCLC   Go to [facility] FB page and send message to get link   https://www.True Style.FundaciÃ³n Bases/healthfoundations/  [facility]/[place] holding virtual meetings the first Tuesday of each month, 8-9 pm, and the   Third Saturday, 10 - 11 am.  Go to [facility] and [place] FB page; message to get link https://www.mobiTeris/LLLofGClair/?hc_location=[facility]  [facility] offers a Lactation Lounge every Friday 12pm - 1pm, run by [place] Manny Leader   Sign up via link at https://www.Gourmet Origins/cbe-lactation  [facility] is offering virtual support groups every Monday, 10:30 am - 12 pm, run by nurse IBCLC   Https://www.True Style.com/events/[email or web address]/    Prenatal Breastfeeding Classes:      [facility] is offering virtual breastfeeding and  care classes:  https://www.Gourmet Origins/education-workshops  BirthEd childbirth and breastfeeding education offering virtual prenatal breastfeeding classes  https://www.birthedmn.com/workshops    Preparing for your baby:      Screening Program  Http://www.health.[facility].mn.us/newbornscreening/  [place] newborns are tested soon after birth for more than 50 hidden, rare disorders, including hearing loss and critical congenital heart disease (CCHD). This site provides resources and information for families and providers.    When to call:   Appointment line and to get a hold of CNM in clinic Monday-Friday 8 am - 5 pm:  [phone].  There are some clinics with early start times (1st appointment 7:40 am) and others with evening hours (last appointment 6:20 pm).  Most are typically open from 8 am to 5 pm.    CNM on call answering service: [phone].  " Specify your hospital of choice and leave a brief message for CNM;  will then page CNM who is on call at your specified hospital and you should receive a call back with 15 minutes.  Be sure that your ringer is audible and that you can accept blocked calls so that we can get back in touch with you! This number should be reserved for urgent needs if during the day, before 8 am, after 5 pm, weekends, holidays.    Contact the on-call CNM with warning signs, such as:  vaginal bleeding   Vaginal discharge and itching or pain and burning during urination  Leg/calf pain or swelling on one side  severe abdominal pain  nausea and vomiting more than 4-5 times a day, or if you are unable to keep anything down  fever more than 100.4 degrees F.     Make plans for transportation and  as needed for when you are going to the hospital.    Ask your health care provider about vaccinations you may need following delivery. By now, you should have received a Tdap immunization to protect against pertussis or whooping cough. Fathers and family members who will be in close contact with the baby should also receive a Tdap shot at least two weeks before the expected birth of the baby if they have not had a Td (tetanus) shot for at least two years.    Tell your midwife or physician how you plan to feed your baby (breast or bottle), who you have chosen to do pediatric care for your baby, and if you have a boy, whether you have chosen to have him circumcised. You will need a car seat correctly installed in your vehicle to bring your baby home. As you start to set up the nursery at home for your baby, make sure the crib is safe. The mattress needs to fit snugly against the edges of the crib. If you can fit a soda can between the bars, they are too far apart and can allow the baby's head to caught between them.    Learn about infant care and feeding, including information about infant CPR. We recommend that you put your baby to  sleep on his or her back to reduce the chance of Sudden Infant Death Syndrome (SIDS). To maintain a healthy environment in which your child can grow, it's best to keep your home smoke-free. By preparing ahead, your transition into parenthood will go smoothly for you and your baby.    Your midwife will want to see you for a checkup 2 to 6 weeks after delivery.

## 2023-05-02 NOTE — H&P
Date: 2023  Time: 4:13 PM    Admission H&P  Mirian Cordova,  1995, MRN [id number]    [facility]  Encounter for induction of labor [Z34.90]    PCP: No Ref-Primary, Physician, None          Extended Emergency Contact Information  Primary Emergency Contact: TashaDewayne  Mobile Phone: [phone]  Relation: Spouse         Chief Complaint: Encounter for induction of labor          HPI:      Mirian is a 27 year old,  at 40w3d, LMP 7/15/22, Estimated Date of Delivery: 2023, confirmed by ultrasound at 9.2 weeks, admitted to [facility] on 2023 for induction of labor, related to term gestation pregnancy with diagnosis of gestational diabetes, diet controlled.      Prenatal History:  Mirian began care with [facility] at the  [facility] on 22 at 9.2 weeks gestation with regular care thereafter for a total of 13 visits.     Pre-pregnant weight:  185 lbs   Pre-pregnant BMI: 32.9    Total weight gain:  13 lbs    Labs:    Prenatal Office Visit on 2023   Component Date Value Ref Range Status     Group B Strep PCR 2023 Negative  Negative Final    Presumed negative for Streptococcus agalactiae (Group B Streptococcus) or the number of organisms may be below the limit of detection of the assay.     Hemoglobin 2023 12.9  11.7 - 15.7 g/dL Final     Hold Specimen 2023 JIC   Final   Lab on 02/10/2023   Component Date Value Ref Range Status     Gestational GTT Fasting 02/10/2023 72  60 - 94 mg/dL Final     Gestational GTT 1 Hr Post Dose 02/10/2023 206 (H)  60 - 179 mg/dL Final     Gestational GTT 2 Hr Post Dose 02/10/2023 160 (H)  60 - 154 mg/dL Final     Gestational GTT 3 Hr Post Dose 02/10/2023 94  60 - 139 mg/dL Final   Prenatal Office Visit on 2023   Component Date Value Ref Range Status     Treponema Antibody Total 2023 Nonreactive  Nonreactive Final     Hemoglobin  02/02/2023 11.8  11.7 - 15.7 g/dL Final     Glu Gest Screen 1hr 50g 02/02/2023 137 (H)  70 - 129 mg/dL Final     Antibody Screen 02/02/2023 Positive (A)  Negative Final     SPECIMEN EXPIRATION DATE 02/02/2023 [id number]   Final     Antibody Identification 02/02/2023 Anti-D post RhoGAM   Final    RHIG GIVEN 12.26.22     SPECIMEN EXPIRATION DATE 02/02/2023 [id number]   Final   Office Visit on 01/08/2023   Component Date Value Ref Range Status     Color Urine 01/08/2023 Yellow  Colorless, Straw, Light Yellow, Yellow Final     Appearance Urine 01/08/2023 Clear  Clear Final     Glucose Urine 01/08/2023 100 (A)  Negative mg/dL Final     Bilirubin Urine 01/08/2023 Negative  Negative Final     Ketones Urine 01/08/2023 Negative  Negative mg/dL Final     Specific Gravity Urine 01/08/2023 1.015  1.005 - 1.030 Final     Blood Urine 01/08/2023 Moderate (A)  Negative Final     pH Urine 01/08/2023 7.0  5.0 - 8.0 Final     Protein Albumin Urine 01/08/2023 Negative  Negative mg/dL Final     Urobilinogen Urine 01/08/2023 1.0  0.2, 1.0 E.U./dL Final     Nitrite Urine 01/08/2023 Positive (A)  Negative Final     Leukocyte Esterase Urine 01/08/2023 Large (A)  Negative Final     Bacteria Urine 01/08/2023 Moderate (A)  None Seen /HPF Final     RBC Urine 01/08/2023 5-10 (A)  0-2 /HPF /HPF Final     WBC Urine 01/08/2023 25-50 (A)  0-5 /HPF /HPF Final     Squamous Epithelials Urine 01/08/2023 Few (A)  None Seen /LPF Final     Culture 01/08/2023 No Growth   Final   Prenatal Office Visit on 01/05/2023   Component Date Value Ref Range Status     Color Urine 01/05/2023 Yellow  Colorless, Straw, Light Yellow, Yellow Final     Appearance Urine 01/05/2023 Clear  Clear Final     Glucose Urine 01/05/2023 Negative  Negative mg/dL Final     Bilirubin Urine 01/05/2023 Negative  Negative Final     Ketones Urine 01/05/2023 Negative  Negative mg/dL Final     Specific Gravity Urine 01/05/2023 1.015  1.005 - 1.030 Final     Blood Urine 01/05/2023  Trace (A)  Negative Final     pH Urine 01/05/2023 7.0  5.0 - 8.0 Final     Protein Albumin Urine 01/05/2023 Negative  Negative mg/dL Final     Urobilinogen Urine 01/05/2023 0.2  0.2, 1.0 E.U./dL Final     Nitrite Urine 01/05/2023 Negative  Negative Final     Leukocyte Esterase Urine 01/05/2023 Negative  Negative Final   Admission on 12/26/2022, Discharged on 12/26/2022   Component Date Value Ref Range Status     Color Urine 12/26/2022 Colorless  Colorless, Straw, Light Yellow, Yellow Final     Appearance Urine 12/26/2022 Clear  Clear Final     Glucose Urine 12/26/2022 Negative  Negative mg/dL Final     Bilirubin Urine 12/26/2022 Negative  Negative Final     Ketones Urine 12/26/2022 Negative  Negative mg/dL Final     Specific Gravity Urine 12/26/2022 1.010  1.001 - 1.030 Final     Blood Urine 12/26/2022 Negative  Negative Final     pH Urine 12/26/2022 6.5  5.0 - 7.0 Final     Protein Albumin Urine 12/26/2022 Negative  Negative mg/dL Final     Urobilinogen Urine 12/26/2022 <2.0  <2.0 mg/dL Final     Nitrite Urine 12/26/2022 Negative  Negative Final     Leukocyte Esterase Urine 12/26/2022 Negative  Negative Final     Trichomonas 12/26/2022 Absent  Absent Final     Yeast 12/26/2022 Absent  Absent Final     Clue Cells 12/26/2022 Absent  Absent Final     WBCs/high power field 12/26/2022 None  None Final     ABO/RH(D) 12/26/2022 O NEG   Final     Fetal Bleed Screen 12/26/2022 NEG  Negative Final     SPECIMEN EXPIRATION DATE 12/26/2022 20221229235900   Final     ABO/RH(D) 12/26/2022 O NEG   Final     Antibody Screen 12/26/2022 Negative  Negative Final     SPECIMEN EXPIRATION DATE 12/26/2022 [id number]   Final   Prenatal Office Visit on 12/06/2022   Component Date Value Ref Range Status     Alpha Feto Protein 12/06/2022 41  ng/mL Final     MoM for AFP 12/06/2022 0.87   Final     AFP Interpretation 12/06/2022 Screen Neg   Final    INTERPRETATION: SCREEN NEGATIVE for open spina bifida                                Neural  Tube Defects (NTD)   Negative                                                                 Pre-Test     Post-Test      Cutoff                                       Neural Tube Defects Risks   1:1030       < 1:65152    1:250                                          Comments:                                                   The risk of an open neural tube defect is less than the  screening cut-off.    This test was developed and its performance characteristics   determined by [facility]. It has not been cleared or   approved by the US Food and Drug Administration. This test   was performed in a CLIA certified laboratory and is   intended for clinical purposes.     Maternal Age [age] Delivery 12/06/2022 29[age]5  yr Final     Patient Weight 12/06/2022 185.0 lbs.   Final     Estimated Due Date 12/06/2022 04-29-23   Final     Gestational Age Calculated 12/06/2022 19 wks, 3 days   Final     Dating 12/06/2022 Ultrasound   Final     Num of Fetuses 12/06/2022 Diaz   Final     Maternal Race 12/06/2022 Nonblack   Final     Insulin Diabetic 12/06/2022 No   Final     Smoking Status 12/06/2022 No   Final     Fam History of NTD 12/06/2022 No   Final     Specimen Iteration 12/06/2022 See Note   Final    Initial sample  Performed by [facility],  [address] [place] [address] [phone]  [email or web address], Shad Plummer MD, PHD, Lab. Director     Amphetamines Urine 12/06/2022 Screen Negative  Screen Negative Final    Cutoff for a negative amphetamine is less than 500 ng/mL.     Benzodiazepine Urine 12/06/2022 Screen Negative  Screen Negative Final    Cutoff for a negative benzodiazepine is less than 100 ng/mL.     Opiates Urine 12/06/2022 Screen Negative  Screen Negative Final    Cutoff for a negative opiate is less than 300 ng/mL.     PCP Urine 12/06/2022 Screen Negative  Screen Negative Final    Cutoff for a negative PCP is less than 25 ng/mL.     Cannabinoids Urine 12/06/2022 Screen Negative  Screen Negative  Final    Cutoff for a negative cannabinoid is less than 50 ng/mL.     Barbituates Urine 12/06/2022 Screen Negative  Screen Negative Final    Cutoff for a negative barbiturate is less than 200 ng/mL.     Cocaine Urine 12/06/2022 Screen Negative  Screen Negative Final    Cutoff for a negative cocaine is less than 300 ng/mL.     Oxycodone Urine 12/06/2022 Screen Negative  Screen Negative Final    Cutoff for a negative oxycodone is less than 100 ng/mL.     Creatinine Urine mg/dL 12/06/2022 81.7  mg/dL Final   Prenatal Office Visit on 10/20/2022   Component Date Value Ref Range Status     Color Urine 10/20/2022 Yellow  Colorless, Straw, Light Yellow, Yellow Final     Appearance Urine 10/20/2022 Clear  Clear Final     Glucose Urine 10/20/2022 Negative  Negative, 1000 , >=2000 mg/dL Final     Bilirubin Urine 10/20/2022 Negative  Negative Final     Ketones Urine 10/20/2022 Negative  Negative, 160  mg/dL Final     Specific Gravity Urine 10/20/2022 >=1.030  1.005 - 1.030 Final     Blood Urine 10/20/2022 Small (A)  Negative Final     pH Urine 10/20/2022 5.5  5.0 - 8.0 Final     Protein Albumin Urine 10/20/2022 30 (A)  Negative, 300 , >=2000 mg/dL Final     Urobilinogen Urine 10/20/2022 0.2  0.2, 1.0 E.U./dL Final     Nitrite Urine 10/20/2022 Negative  Negative Final     Leukocyte Esterase Urine 10/20/2022 Negative  Negative Final     Culture 10/20/2022 No Growth   Final   Admission on 10/18/2022, Discharged on 10/19/2022   Component Date Value Ref Range Status     Color Urine 10/18/2022 Yellow  Colorless, Straw, Light Yellow, Yellow Final     Appearance Urine 10/18/2022 Cloudy (A)  Clear Final     Glucose Urine 10/18/2022 Negative  Negative mg/dL Final     Bilirubin Urine 10/18/2022 Negative  Negative Final     Ketones Urine 10/18/2022 40 (A)  Negative mg/dL Final     Specific Gravity Urine 10/18/2022 1.021  1.001 - 1.030 Final     Blood Urine 10/18/2022 1.0 mg/dL (A)  Negative Final     pH Urine 10/18/2022 6.0  5.0 - 7.0  Final     Protein Albumin Urine 10/18/2022 300 (A)  Negative mg/dL Final     Urobilinogen Urine 10/18/2022 <2.0  <2.0 mg/dL Final     Nitrite Urine 10/18/2022 Negative  Negative Final     Leukocyte Esterase Urine 10/18/2022 500 Dejuan/uL (A)  Negative Final     Mucus Urine 10/18/2022 Present (A)  None Seen /LPF Final     RBC Urine 10/18/2022 >182 (H)  <=2 /HPF Final     WBC Urine 10/18/2022 >182 (H)  <=5 /HPF Final     Squamous Epithelials Urine 10/18/2022 2 (H)  <=1 /HPF Final     Culture 10/18/2022 10,000-50,000 CFU/mL Mixture of urogenital kailyn   Final   Prenatal Office Visit on 10/04/2022   Component Date Value Ref Range Status     See Scanned Result 10/04/2022 INVITAE NON-INVASIVE PRENATAL SCREENING-Scanned   Final   There may be more visits with results that are not included.           Pertinent Radiology:  Working NIXON: 2023 set by Janis Calvo CNM on 2022 based on Ultrasound on 2022  Based On NIXON GA Diff User Date   Last Menstrual Period on 07/15/2022 (Exact Date) 2023 +1w1d Janis Calvo CNM 2022      Ultrasound on 2022 Working Janis Calvo CNM 2022   GA:  9w2d   Ultrasound on 2022 +3d Hafsa Patrick CNM 12/15/2022   GA:  21w0d  Comment:  SIUP, post placenta, EFW 60%ile, normal growth, unable to view fetal heart and outflow tracts, otherwise normal fetal survey   Ultrasound on 2023 +3d Wendy Jonas CNM 2023   GA:  24w1d  Comment:  Normal fetal heart and outflow tracts. The fetal survey is complete.  EFW 68%         OB HISTORY  OB History    Para Term  AB Living   1 0 0 0 0 0   SAB IAB Ectopic Multiple Live Births   0 0 0 0 0      # Outcome Date GA Lbr Pedro/2nd Weight Sex Delivery Anes PTL Lv   1 Current                    Medical History  Past Medical History:   Diagnosis Date     Urinary tract infection     Reports many prior to pregnancy      Surgical History  She  has a  past surgical history that includes wisdom teeth (Bilateral, 2011) and Laser Ablation Of The Cervix (2012).       Social History  Reviewed, and she  reports that she has never smoked. She has never been exposed to tobacco smoke. She has never used smokeless tobacco. She reports that she does not currently use alcohol. She reports current drug use. Drug: Marijuana.  Partner: Dewayne  Education level: post-graduate  Occupation: Speech language pathologist      Family History  Reviewed, and family history includes Alcoholism in her paternal grandfather; Cancer in her mother and paternal grandfather; Diabetes Type 1 in her brother; Early Death in her brother; Hypertension in her father and paternal grandfather; Substance Abuse in her brother, maternal grandmother, and mother.          No Known Allergies   Medications Prior to Admission   Medication Sig Dispense Refill Last Dose     acetone urine (KETOSTIX) test strip Check ketones in urine once daily 50 strip 3      alcohol swab prep pads Use to swab area of injection/isaac as directed. 100 each 3      aspirin (ASA) 81 MG chewable tablet Take 1 tablet by mouth daily        blood glucose (NO BRAND SPECIFIED) test strip Use to test blood sugar 4 times daily or as directed. To accompany: Blood Glucose Monitor Brands: per insurance. 100 strip 6      blood glucose monitoring (NO BRAND SPECIFIED) meter device kit Use to test blood sugar 4 times daily or as directed. Preferred blood glucose meter OR supplies to accompany: Blood Glucose Monitor Brands: per insurance. 1 kit 0      Docusate Calcium (STOOL SOFTENER PO)         Prenatal Vit-Fe Fumarate-FA (PRENATAL VITAMIN PO) Take 1 split tablet by mouth daily        thin (NO BRAND SPECIFIED) lancets Use to test blood sugar 4 times daily or as directed. To accompany: Blood Glucose Monitor Brands: per insurance. 100 each 6         Review of Systems:  A comprehensive review of systems was negative.   Physical Exam:  Temp:  [98.4  F  (36.9  C)] 98.4  F (36.9  C)  Pulse:  [71] 71  Resp:  [16] 16  BP: (112)/(65) 112/65  SpO2:  [99 %] 99 %      Heart:  RRR, negative murmur  Lungs:  Clear to ascultation bilaterally, non-labored breathing pattern  Abdomen:  Soft, non-tender, gravid with S = D, cephalic position per leopolds.  Also confirmed presentation with bedside US.  Legs:  no edema, no varicosities.        Membrane Status:  intact     Cervical Exam:  Exam done in clinic yesterday afternoon, and not repeated today on admission.  Yesterday was:  Closed, long, -3, posterior and cervical opening points to maternal left hip, med-soft consistency.  No bloody show.     Fetal Heart Rate:   baseline 119, mod variability, accels present, no decels.   Uterine Activity:   Intermittent, mild contractions.                    Impression:  -27 year old year old at 40w3d weeks gestation  -IOL related to term gestation pregnancy and diagnosis of diet-controlled gestational diabetes  -Gestational diabetes - in good control  -Blood RH negative (O negative), and rubella immune  -GBS negative  -Category I FHTs   -History of cannabis use prior to pregnancy (+ cannabinoids in urine tox screen on 9/27/22).  Educated to abstain in pregnancy.  -Pre-preg BMI 32 with 13 lb weight gain.       Plan:  1[facility]  2)  Need for cervical ripening.  Discussed options.  Will initiate misoprostol orally per order set.  Plan to reevaluate in AM (prior to 24 hour dosing - may continue further misoprostol doses alone, could consider added cook/deluca cath if can get it in the cervix, or could  PRN).  Knows that unripe cervix may require a few days of cervical ripening.  Feeling patient with process.  Patient may have sleep meds overnight PRN.  3) Continuous fetal monitoring  4) Plan Rhogam postpartum (blood O-)  5) Plan routine blood sugar checks during cervical ripening (QID - fasting and 1 hour PP).  Once in active labor can just check PRN.    6)  Consider mec tox screen PRN  7) Encourage position changes, and rest as needed.  8) Anticipate progress toward vaginal birth        Total time with patient:  40 minutes at bedside and in the USP obtaining and clarifying the above history, performing the physical exam, education and counseling and developing this plan of care.  >50% spent on counseling and coordination of care.        Provider: RAMIN Low CNM

## 2023-05-03 ENCOUNTER — ANESTHESIA EVENT (OUTPATIENT)
Dept: OBGYN | Facility: CLINIC | Age: 28
End: 2023-05-03
Payer: COMMERCIAL

## 2023-05-03 ENCOUNTER — ANESTHESIA (OUTPATIENT)
Dept: OBGYN | Facility: CLINIC | Age: 28
End: 2023-05-03
Payer: COMMERCIAL

## 2023-05-03 LAB
GLUCOSE BLDC GLUCOMTR-MCNC: 102 MG/DL (ref 70–99)
GLUCOSE BLDC GLUCOMTR-MCNC: 102 MG/DL (ref 70–99)
GLUCOSE BLDC GLUCOMTR-MCNC: 79 MG/DL (ref 70–99)
T PALLIDUM AB SER QL: NONREACTIVE

## 2023-05-03 PROCEDURE — 3E0R3BZ INTRODUCTION OF ANESTHETIC AGENT INTO SPINAL CANAL, PERCUTANEOUS APPROACH: ICD-10-PCS | Performed by: ANESTHESIOLOGY

## 2023-05-03 PROCEDURE — 250N000013 HC RX MED GY IP 250 OP 250 PS 637: Performed by: ADVANCED PRACTICE MIDWIFE

## 2023-05-03 PROCEDURE — 370N000003 HC ANESTHESIA WARD SERVICE: Performed by: ANESTHESIOLOGY

## 2023-05-03 PROCEDURE — 120N000001 HC R&B MED SURG/OB

## 2023-05-03 PROCEDURE — 99233 SBSQ HOSP IP/OBS HIGH 50: CPT | Mod: 24 | Performed by: ADVANCED PRACTICE MIDWIFE

## 2023-05-03 PROCEDURE — 250N000009 HC RX 250: Performed by: ANESTHESIOLOGY

## 2023-05-03 PROCEDURE — 250N000011 HC RX IP 250 OP 636: Performed by: ANESTHESIOLOGY

## 2023-05-03 PROCEDURE — 00HU33Z INSERTION OF INFUSION DEVICE INTO SPINAL CANAL, PERCUTANEOUS APPROACH: ICD-10-PCS | Performed by: ANESTHESIOLOGY

## 2023-05-03 PROCEDURE — 3E033VJ INTRODUCTION OF OTHER HORMONE INTO PERIPHERAL VEIN, PERCUTANEOUS APPROACH: ICD-10-PCS | Performed by: OBSTETRICS & GYNECOLOGY

## 2023-05-03 PROCEDURE — 59200 INSERT CERVICAL DILATOR: CPT | Performed by: ADVANCED PRACTICE MIDWIFE

## 2023-05-03 PROCEDURE — 258N000003 HC RX IP 258 OP 636: Performed by: ADVANCED PRACTICE MIDWIFE

## 2023-05-03 PROCEDURE — 250N000009 HC RX 250: Performed by: ADVANCED PRACTICE MIDWIFE

## 2023-05-03 RX ORDER — SODIUM CHLORIDE, SODIUM LACTATE, POTASSIUM CHLORIDE, CALCIUM CHLORIDE 600; 310; 30; 20 MG/100ML; MG/100ML; MG/100ML; MG/100ML
INJECTION, SOLUTION INTRAVENOUS CONTINUOUS PRN
Status: DISCONTINUED | OUTPATIENT
Start: 2023-05-03 | End: 2023-05-04 | Stop reason: HOSPADM

## 2023-05-03 RX ORDER — FENTANYL CITRATE-0.9 % NACL/PF 10 MCG/ML
100 PLASTIC BAG, INJECTION (ML) INTRAVENOUS EVERY 5 MIN PRN
Status: DISCONTINUED | OUTPATIENT
Start: 2023-05-03 | End: 2023-05-04 | Stop reason: HOSPADM

## 2023-05-03 RX ORDER — OXYTOCIN/0.9 % SODIUM CHLORIDE 30/500 ML
1-24 PLASTIC BAG, INJECTION (ML) INTRAVENOUS CONTINUOUS
Status: DISCONTINUED | OUTPATIENT
Start: 2023-05-03 | End: 2023-05-04 | Stop reason: HOSPADM

## 2023-05-03 RX ORDER — LIDOCAINE HCL/EPINEPHRINE/PF 2%-1:200K
VIAL (ML) INJECTION
Status: COMPLETED | OUTPATIENT
Start: 2023-05-03 | End: 2023-05-03

## 2023-05-03 RX ORDER — LIDOCAINE 40 MG/G
CREAM TOPICAL
Status: DISCONTINUED | OUTPATIENT
Start: 2023-05-03 | End: 2023-05-04 | Stop reason: HOSPADM

## 2023-05-03 RX ORDER — LIDOCAINE HYDROCHLORIDE AND EPINEPHRINE 15; 5 MG/ML; UG/ML
3 INJECTION, SOLUTION EPIDURAL
Status: DISCONTINUED | OUTPATIENT
Start: 2023-05-03 | End: 2023-05-04 | Stop reason: HOSPADM

## 2023-05-03 RX ORDER — FENTANYL/ROPIVACAINE/NS/PF 2MCG/ML-.1
PLASTIC BAG, INJECTION (ML) EPIDURAL
Status: DISCONTINUED | OUTPATIENT
Start: 2023-05-03 | End: 2023-05-04 | Stop reason: HOSPADM

## 2023-05-03 RX ORDER — NALBUPHINE HYDROCHLORIDE 10 MG/ML
2.5-5 INJECTION, SOLUTION INTRAMUSCULAR; INTRAVENOUS; SUBCUTANEOUS EVERY 6 HOURS PRN
Status: DISCONTINUED | OUTPATIENT
Start: 2023-05-03 | End: 2023-05-05 | Stop reason: HOSPADM

## 2023-05-03 RX ORDER — TERBUTALINE SULFATE 1 MG/ML
0.25 INJECTION, SOLUTION SUBCUTANEOUS
Status: DISCONTINUED | OUTPATIENT
Start: 2023-05-03 | End: 2023-05-04 | Stop reason: HOSPADM

## 2023-05-03 RX ORDER — BUPIVACAINE HYDROCHLORIDE 2.5 MG/ML
INJECTION, SOLUTION EPIDURAL; INFILTRATION; INTRACAUDAL
Status: COMPLETED | OUTPATIENT
Start: 2023-05-03 | End: 2023-05-03

## 2023-05-03 RX ADMIN — Medication 25 MCG: at 00:33

## 2023-05-03 RX ADMIN — Medication 25 MCG: at 04:27

## 2023-05-03 RX ADMIN — SODIUM CHLORIDE, POTASSIUM CHLORIDE, SODIUM LACTATE AND CALCIUM CHLORIDE: 600; 310; 30; 20 INJECTION, SOLUTION INTRAVENOUS at 15:53

## 2023-05-03 RX ADMIN — Medication 25 MCG: at 09:03

## 2023-05-03 RX ADMIN — Medication 25 MCG: at 02:36

## 2023-05-03 RX ADMIN — Medication 25 MCG: at 06:38

## 2023-05-03 RX ADMIN — LIDOCAINE HYDROCHLORIDE,EPINEPHRINE BITARTRATE 2 ML: 20; .005 INJECTION, SOLUTION EPIDURAL; INFILTRATION; INTRACAUDAL; PERINEURAL at 21:57

## 2023-05-03 RX ADMIN — Medication: at 22:07

## 2023-05-03 RX ADMIN — Medication 2 MILLI-UNITS/MIN: at 16:15

## 2023-05-03 RX ADMIN — BUPIVACAINE HYDROCHLORIDE 5 ML: 2.5 INJECTION, SOLUTION EPIDURAL; INFILTRATION; INTRACAUDAL at 21:57

## 2023-05-03 ASSESSMENT — ACTIVITIES OF DAILY LIVING (ADL)
ADLS_ACUITY_SCORE: 18

## 2023-05-03 NOTE — PROGRESS NOTES
Labor Progress Note:    Patient Name:  Mirian Cordova  :      1995  MRN:      [id number]        Short visit with Angelita who is currently in the tub. Notes less cramping since immediately following cook catheter placement but continues with noticeable contractions. She is enjoying the relaxation in the tub and offers no concerns at this time. Currently FHR: Cat I, moderate variability, accelerations present, no decelerations, baseline: 135 bpm. Uterine contractions: every 2-4 minutes, 60-80 seconds long, mild.  Encouraged to exit tub every 90 minutes to void, encouraged to eat and drink. All questions answered.       Total time spent with patient: 10 minutes, >50% time spent counseling and coordination of care.      Provider:  Janis Calvo DNP, APRN, CNM            Date:  5/3/2023  Time:  12:24 PM

## 2023-05-03 NOTE — PROGRESS NOTES
Labor Progress Note:    Assessment: 27 year old  @ 40w4d here for induction of labor  - Fetal status: FHT Category 1 with moderate variability  -s/p cook catheter, spontaneous expulsion at 1430  -s/p 9 cytotec; discontinued with cook placement  -A1GDM  -Rh negative status  -GBS negative  -pitocin, 4mU    Plan:   -Report given to oncoming CNM JOSE MARIA Jerez who assumes care at 1800.  - pitocin per low dose protocol  -continuous EFM per protocol  -plan is flexible for pain management  -plan for cord blood collection due to maternal Rh negative status  -Routine CNM support & management. Encourage position changes, ambulation, rest as desired.  -Anticipate progress and NSVB.   -Reevaluate progress in 2 hours or sooner with a change in status.       Subjective:  Mirian is coping well with contractions. IV placement was difficult and start of pitocin was delayed. She is playing cards with her friend, Marianne. Feeling comfortable overall but stopping to work through some contractions. Asking appropriate questions PO fluid intake. Voiding without issue. Julee are supportive at bedside.    Objective:  Patient Vitals for the past 4 hrs:   BP Temp Temp src Pulse Resp SpO2   23 1555 127/80 98.5  F (36.9  C) Oral 74 16 99 %            Fetal Heart Rate:  FHR:Baseline: 135 bpm, Variability: Moderate (6 - 25 bpm), Accelerations: present and Decelerations: Absent    Uterine contractions:TocoFrequency: Every 1-5 minutes, Duration: 30-80 seconds and Intensity: mild    SVE:deferred          Janis Calvo, JOSE CARLOS, APRN, CNM      Total time spent with patient: 5 minutes, of which >50% time spent counseling and coordination of care.    5/3/2023 6:19 PM

## 2023-05-03 NOTE — PROGRESS NOTES
Labor Progress Note:    Assessment: 27 year old  @ 40w4d here for induction of labor  - Fetal status: FHT Category 1 with moderate variability  -s/p cook catheter, spontaneous expulsion at 1430  -s/p 9 cytotec; discontinued with cook placement  -favorable cervix  -A1GDM  -Rh negative status  -GBS negative    Plan:   - Discussed plan for labor management and accepting of pitocin induction.   -Reviewed pain management plan, will let us know if desires medication management at any time. Reviewed non-pharmacologic methods for coping through early phase of labor.  -pitocin per low dose protocol  -continuous EFM per protocol  -plan for cord blood collection due to maternal Rh negative status  -Routine CNM support & management. Encourage position changes, ambulation, rest as desired.  -Anticipate progress and NSVB.   -Reevaluate progress in 2 hours or sooner with a change in status.       Subjective:  Mirian is coping well with contractions but expressed occasionally feel more moderate in strength now. On return from the toilet, following a BM, noted more vaginal fullness and with a gentle tug she was able to draw the balloon out. Moderate red to brown blood show present with intact cook catheter.   Angelita has been up and active, walking the halls, some time resting in bed as well. Adequate PO fluid intake. Voiding without issue. Dewayne is supportive at bedside. Notes friend Marianne is on her way from [place].     Objective:  Patient Vitals for the past 4 hrs:   BP Temp Temp src Pulse Resp SpO2   23 1333 127/75 98.6  F (37  C) Oral 63 18 99 %            Fetal Heart Rate:  FHR:Baseline: 135 bpm, Variability: Moderate (6 - 25 bpm), Accelerations: present and Decelerations: Absent    Uterine contractions:TocoFrequency: Every 2-5 minutes, Duration: 45-60 seconds and Intensity: mild    SVE: 4/33%/-2 far maternal left, soft          Janis Calvo, JOSE CARLOS, APRN, CNM      Total time spent with patient: 30 minutes, of  which >50% time spent counseling and coordination of care.    5/3/2023 2:46 PM

## 2023-05-03 NOTE — PROGRESS NOTES
"Labor Progress Note:    Assessment: 27 year old  @ 40w4d here for induction of labor  - Fetal status: FHT Category 1 with moderate variability  -unfavorable cervix  -cook catheter placed without difficult  -s/p 9 cytotec; discontinued with cook placement  -A1GDM  -Rh negative status  -GBS negative    Plan:   - Discussed options for ongoing cervical ripening. Plan to place cervical ripening balloon. Due to difficult cervix position opted for cook catheter and pt notified of need to discontinue cytotec with this device.   -Routine CNM support & management. Encourage position changes, ambulation, rest as desired.  -Anticipate progress and NSVB.   -Reevaluate progress in 2 hours or sooner with a change in status.       Subjective:  Mirian is coping well with contractions. Reports slept okay overnight. Had good appetite this morning and has been eating and drinking normally. She is cheerful and is motivated to continue to see progress. Notes she can tell she has been cramping, notes the contractions when they come and go and describes as mild. Adequate PO fluid intake. Voiding without issue. Partner Dewayne is supportive at bedside.    Objective:       Patient Vitals for the past 24 hrs:   BP Temp Temp src Pulse Resp SpO2 Height Weight   23 2230 106/53 -- -- -- -- -- -- --   23 126/77 -- -- -- -- -- -- --   23 1530 112/65 98.4  F (36.9  C) Oral 71 16 99 % 1.6 m (5' 3\") 90.3 kg (199 lb)         Fetal Heart Rate:  FHR:Baseline: 130 bpm, Variability: Moderate (6 - 25 bpm), Accelerations: present and Decelerations: Absent    Uterine contractions:TocoFrequency: Every 1-6 minutes, Duration: 40-60 seconds and Intensity: mild    SVE:1/25%/-2 far maternal left position, firm  Discussed options for cervical ripening; questions answered and pt prefers cooks cathetercervical ripening balloon. Reviewed risks and benefits. SVE revealed 1cm dilation and maternal left position, the sterile catheter with " stylet in place was threaded along side exam fingers and introduced through the cervix without difficulty up to the vaginal balloon with scant blood noticed with distruption of the cervical os via normal manual exam. At that time 20 mL of sterile water was placed in the uterine catheter balloon, the catheter was retracted until resistance met, then 20 mL of sterile water was placed in the vaginal balloon and the stylet was removed. The balloons were then filled in an alternating pattern (Uterine then Vaginal) for 20 mL until amaximum of 60 mL sterile water was complete in both balloons. Angelita tolerated the procedure well and noted only a small amount of pressure. Reviewed monitoring protocol.          Janis Calvo, JOSE CARLOS, APRN, CNM      Total time spent with patient: 40 minutes, of which >50% time spent counseling and coordination of care.    5/3/2023 9:39 AM

## 2023-05-03 NOTE — PROGRESS NOTES
"Labor Progress Note:    Patient Name:  Mirian Cordova  :      1995  MRN:      [id number]        Subjective:  Mirian is comfortable still.  Aware of cramping but coping well.  Currently walking in hallway.  Reports typical bedtime is sometime between now and 10 PM.    Dewayne is supportive at her side.      Objective:  Temp:  [98.4  F (36.9  C)] 98.4  F (36.9  C)  Pulse:  [71] 71  Resp:  [16] 16  BP: (112)/(65) 112/65  SpO2:  [99 %] 99 %  /65 (BP Location: Right arm, Patient Position: Semi-Myles's, Cuff Size: Adult Regular)   Pulse 71   Temp 98.4  F (36.9  C) (Oral)   Resp 16   Ht 1.6 m (5' 3\")   Wt 90.3 kg (199 lb)   LMP 07/15/2022 (Exact Date)   SpO2 99%   BMI 35.25 kg/m      FHR: 120, mod variability, accels present, intermittent variable decel martin 85-90.   Contractions: irregular, mild to palpation.  Cervix: exam deferred at this time.  Other: just had 3rd cytotec dose at       Assessment:    -40.3 week IOL related to term gestation pregnancy and diagnosis of diet-controlled gestational diabetes.  Currently doing cervical ripening with cytotec (just had 3rd dose)  -Gestational diabetes - in good control  -Category II FHTs         Plan:   - Discussed non-medication comfort measures.  Also encouraged use of sleep meds (hydroxyzine and morphine) as needed to aid in sleep.  Encouraged normal bedtime routine and timing (to go to bed soon).   - Day call CNM (Janis Calvo, JOSE CARLOS, APRN, CNM) to re-evaluate in the morning to see if Mirian needs continued cytotec dosing (+/- a deluca or cook cath), a different cervical ripening or IOL agent.  Janis Calvo will assume care at 0600.  -Anticipate progress         Total time spent with patient: 10 minutes, of which >50% time spent counseling and coordination of care.      Provider:  RAMIN Low/MARINE        Date:  2023  Time:  8:38 PM        "

## 2023-05-03 NOTE — PLAN OF CARE
Problem: Labor  Goal: Stable Fetal Wellbeing  Outcome: Progressing     Problem: Labor  Goal: Acceptable Pain Control  Outcome: Progressing    Last SVE @1445: 4/33%/-2. Pt currently on 4mU/min of Pitocin. Pt coping well with contractions-ambulating in garay, yoga ball, bath tub, swaying. Support team at bedside.     Princess Garay RN

## 2023-05-04 PROBLEM — Z37.9 VACUUM-ASSISTED VAGINAL DELIVERY: Status: ACTIVE | Noted: 2023-05-04

## 2023-05-04 LAB — HGB BLD-MCNC: 11.3 G/DL (ref 11.7–15.7)

## 2023-05-04 PROCEDURE — 99233 SBSQ HOSP IP/OBS HIGH 50: CPT | Performed by: ADVANCED PRACTICE MIDWIFE

## 2023-05-04 PROCEDURE — 0HQ9XZZ REPAIR PERINEUM SKIN, EXTERNAL APPROACH: ICD-10-PCS | Performed by: OBSTETRICS & GYNECOLOGY

## 2023-05-04 PROCEDURE — 85018 HEMOGLOBIN: CPT | Performed by: ADVANCED PRACTICE MIDWIFE

## 2023-05-04 PROCEDURE — 250N000009 HC RX 250: Performed by: ADVANCED PRACTICE MIDWIFE

## 2023-05-04 PROCEDURE — 120N000001 HC R&B MED SURG/OB

## 2023-05-04 PROCEDURE — 722N000001 HC LABOR CARE VAGINAL DELIVERY SINGLE

## 2023-05-04 PROCEDURE — 36415 COLL VENOUS BLD VENIPUNCTURE: CPT | Performed by: ADVANCED PRACTICE MIDWIFE

## 2023-05-04 PROCEDURE — 10907ZC DRAINAGE OF AMNIOTIC FLUID, THERAPEUTIC FROM PRODUCTS OF CONCEPTION, VIA NATURAL OR ARTIFICIAL OPENING: ICD-10-PCS | Performed by: OBSTETRICS & GYNECOLOGY

## 2023-05-04 PROCEDURE — 250N000013 HC RX MED GY IP 250 OP 250 PS 637: Performed by: OBSTETRICS & GYNECOLOGY

## 2023-05-04 PROCEDURE — 999N000016 HC STATISTIC ATTENDANCE AT DELIVERY

## 2023-05-04 RX ORDER — MISOPROSTOL 200 UG/1
400 TABLET ORAL
Status: DISCONTINUED | OUTPATIENT
Start: 2023-05-04 | End: 2023-05-05 | Stop reason: HOSPADM

## 2023-05-04 RX ORDER — ACETAMINOPHEN 325 MG/1
650 TABLET ORAL EVERY 4 HOURS PRN
Status: DISCONTINUED | OUTPATIENT
Start: 2023-05-04 | End: 2023-05-05 | Stop reason: HOSPADM

## 2023-05-04 RX ORDER — MISOPROSTOL 200 UG/1
800 TABLET ORAL
Status: DISCONTINUED | OUTPATIENT
Start: 2023-05-04 | End: 2023-05-05 | Stop reason: HOSPADM

## 2023-05-04 RX ORDER — IBUPROFEN 800 MG/1
800 TABLET, FILM COATED ORAL EVERY 6 HOURS PRN
Status: DISCONTINUED | OUTPATIENT
Start: 2023-05-04 | End: 2023-05-05 | Stop reason: HOSPADM

## 2023-05-04 RX ORDER — METHYLERGONOVINE MALEATE 0.2 MG/ML
200 INJECTION INTRAVENOUS
Status: DISCONTINUED | OUTPATIENT
Start: 2023-05-04 | End: 2023-05-05 | Stop reason: HOSPADM

## 2023-05-04 RX ORDER — DOCUSATE SODIUM 100 MG/1
100 CAPSULE, LIQUID FILLED ORAL DAILY
Status: DISCONTINUED | OUTPATIENT
Start: 2023-05-04 | End: 2023-05-05 | Stop reason: HOSPADM

## 2023-05-04 RX ORDER — HYDROCORTISONE 25 MG/G
CREAM TOPICAL 3 TIMES DAILY PRN
Status: DISCONTINUED | OUTPATIENT
Start: 2023-05-04 | End: 2023-05-05 | Stop reason: HOSPADM

## 2023-05-04 RX ORDER — BISACODYL 10 MG
10 SUPPOSITORY, RECTAL RECTAL DAILY PRN
Status: DISCONTINUED | OUTPATIENT
Start: 2023-05-04 | End: 2023-05-05 | Stop reason: HOSPADM

## 2023-05-04 RX ORDER — MODIFIED LANOLIN
OINTMENT (GRAM) TOPICAL
Status: DISCONTINUED | OUTPATIENT
Start: 2023-05-04 | End: 2023-05-05 | Stop reason: HOSPADM

## 2023-05-04 RX ORDER — CARBOPROST TROMETHAMINE 250 UG/ML
250 INJECTION, SOLUTION INTRAMUSCULAR
Status: DISCONTINUED | OUTPATIENT
Start: 2023-05-04 | End: 2023-05-05 | Stop reason: HOSPADM

## 2023-05-04 RX ORDER — OXYTOCIN/0.9 % SODIUM CHLORIDE 30/500 ML
340 PLASTIC BAG, INJECTION (ML) INTRAVENOUS CONTINUOUS PRN
Status: DISCONTINUED | OUTPATIENT
Start: 2023-05-04 | End: 2023-05-05 | Stop reason: HOSPADM

## 2023-05-04 RX ORDER — OXYTOCIN 10 [USP'U]/ML
10 INJECTION, SOLUTION INTRAMUSCULAR; INTRAVENOUS
Status: DISCONTINUED | OUTPATIENT
Start: 2023-05-04 | End: 2023-05-05 | Stop reason: HOSPADM

## 2023-05-04 RX ADMIN — ACETAMINOPHEN 650 MG: 325 TABLET ORAL at 09:08

## 2023-05-04 RX ADMIN — IBUPROFEN 800 MG: 800 TABLET ORAL at 19:43

## 2023-05-04 RX ADMIN — DOCUSATE SODIUM 100 MG: 100 CAPSULE, LIQUID FILLED ORAL at 09:08

## 2023-05-04 RX ADMIN — LIDOCAINE HYDROCHLORIDE 20 ML: 10 INJECTION, SOLUTION EPIDURAL; INFILTRATION; INTRACAUDAL; PERINEURAL at 04:31

## 2023-05-04 RX ADMIN — MISOPROSTOL 400 MCG: 200 TABLET ORAL at 09:58

## 2023-05-04 RX ADMIN — ACETAMINOPHEN 650 MG: 325 TABLET ORAL at 23:33

## 2023-05-04 RX ADMIN — IBUPROFEN 800 MG: 800 TABLET ORAL at 09:07

## 2023-05-04 RX ADMIN — Medication 340 ML/HR: at 04:26

## 2023-05-04 ASSESSMENT — ACTIVITIES OF DAILY LIVING (ADL)
ADLS_ACUITY_SCORE: 18

## 2023-05-04 NOTE — PLAN OF CARE
Problem: Postpartum (Vaginal Delivery)  Goal: Optimal Pain Control and Function  Intervention: Prevent or Manage Pain  Recent Flowsheet Documentation  Taken 5/4/2023 1300 by Mick Redmond, RN  Pain Management Interventions: cold applied  Taken 5/4/2023 0900 by Mick Redmond, RN  Pain Management Interventions: cold applied   Goal Outcome Evaluation:      Patient dizzy/lightheaded OOB this AM. Now ambulating in the hallway independently. QBL reported by NOC delivery RN different than noted in Epic, information given to midwife. Patient given Cytotec orally x 1 dose. Noted decreased bleeding since this morning. Fundus is firm at midline. Patient is voiding. Patient reports pain to be tolerable with use of PRN Tylenol and Ibuprofen. Mom is breastfeeding baby, utilized breast shield intermittently. Mom is able to latch baby without staff assistance after several attempts. Spouse is supportive and at bedside.

## 2023-05-04 NOTE — ANESTHESIA PREPROCEDURE EVALUATION
Anesthesia Pre-Procedure Evaluation    Patient: Mirian Cordova   MRN: [id number] : 1995        Procedure : * No procedures listed *          Past Medical History:   Diagnosis Date     Urinary tract infection     Reports many prior to pregnancy      Past Surgical History:   Procedure Laterality Date     LASER ABLATION OF THE CERVIX  2012     wisdom teeth Bilateral       No Known Allergies   Social History     Tobacco Use     Smoking status: Never     Passive exposure: Never     Smokeless tobacco: Never   Vaping Use     Vaping status: Not on file   Substance Use Topics     Alcohol use: Not Currently      Wt Readings from Last 1 Encounters:   23 90.3 kg (199 lb)        Anesthesia Evaluation            ROS/MED HX  ENT/Pulmonary:       Neurologic:       Cardiovascular:       METS/Exercise Tolerance:     Hematologic:       Musculoskeletal:       GI/Hepatic:       Renal/Genitourinary:       Endo:     (+) type I DM,     Psychiatric/Substance Use:       Infectious Disease:       Malignancy:       Other:            Physical Exam    Airway        Mallampati: II   TM distance: > 3 FB   Neck ROM: full     Respiratory Devices and Support         Dental           Cardiovascular             Pulmonary                   OUTSIDE LABS:  CBC:   Lab Results   Component Value Date    WBC 9.1 2023    WBC 10.9 2022    HGB 13.0 2023    HGB 12.9 2023    HCT 39.4 2023    HCT 40.4 2022     2023     2022     BMP:   Lab Results   Component Value Date     (H) 2023     (H) 2023     COAGS: No results found for: PTT, INR, FIBR  POC: No results found for: BGM, HCG, HCGS  HEPATIC: No results found for: ALBUMIN, PROTTOTAL, ALT, AST, GGT, ALKPHOS, BILITOTAL, BILIDIRECT, GERMANIA  OTHER:   Lab Results   Component Value Date    A1C 5.1 2023       Anesthesia Plan    ASA Status:  2      Anesthesia Type: Epidural.               Consents    Anesthesia Plan(s) and associated risks, benefits, and realistic alternatives discussed. Questions answered and patient/representative(s) expressed understanding.    - Discussed:     - Discussed with:  Patient         Postoperative Care            Comments:                Aleida Jones MD

## 2023-05-04 NOTE — PROGRESS NOTES
Called to room for vacuum delivery. Baby was born and remained with mom. No further interventions required.

## 2023-05-04 NOTE — PROGRESS NOTES
Day of Delivery:  CNM visit.  Pt just latched baby for the first time.  Has been up out of bed and was able to empty her bladder.  Per RN QBL just over 1000mL.  Bleeding currently small.  Will leave IV site in place for now, check hgb STAT, cytotec 400mcg oral now, continue to monitor closely.  Encouraged rest, skin to skin, focus on breastfeeding, pain control today.  RAMIN Dunaway, CNM

## 2023-05-04 NOTE — PROGRESS NOTES
"Labor Progress Note:    Patient Name:  Mirian Cordova  :      1995  MRN:      [id number]    Assessment:    -27 year old  @ 40w4d  -Pitocin induction of labor  -Fetal status: FHT Category 1 with moderate variability  -s/p cervical ripening with cook catheter and 9 doses of PO cytotec  -GDMA1  -Rh negative status  -GBS negative    Plan:   -Payton urinary catheter.  -Encouraged rest at this time. In agreement and friends will go home now.   -Positioning with peanut ball to promote labor progress.   -Continue with low dose IV pitocin. Titrate to achieve adequate labor.   -Anticipate progress and NSVB.   -Reevaluate progress in 2 hours or sooner with a change in status.     Subjective:  Angelita is much more comfortable after the epidural was placed. She is feeling intermittent rectal pressure but denies an urge to push. She is glad she got an epidural. Family and friends supportive at bedside.    Objective:  /55   Pulse 70   Temp 98.2  F (36.8  C) (Oral)   Resp 16   Ht 1.6 m (5' 3\")   Wt 90.3 kg (199 lb)   LMP 07/15/2022 (Exact Date)   SpO2 99%   BMI 35.25 kg/m      FHR: 130 baseline, moderate variability, 15 X 15 accels present, no decels present.   Contractions: q 1-4 minutes, 60 -80 sec in duration, moderate to palpation. Soft resting tone.   Cervix: 5, 60%, -2, mid, soft. AROM performed for small amt of clear fluid. IUPC inserted without difficulty.    Pitocin: 8mU    Total time spent with patient: 40 minutes, of which >50% time spent counseling and coordination of care.    Provider:  RAMIN Mariscal CNM    Date:  5/3/2023  Time:  11:17 PM        "

## 2023-05-04 NOTE — PROGRESS NOTES
Nitrous Oxide initiated at 2110 per patient request.    RN at bedside for 15 minutes following Nitrous Oxide 50/50 inhalation initiation. Patient is observed using the equipment appropriately. Patient appears to be not coping with labor. Patient is free of side effects.    Nitrous Oxide discontinued at 2130 per patient request.    Marium Tyson RN

## 2023-05-04 NOTE — ANESTHESIA PROCEDURE NOTES
"Epidural catheter Procedure Note    Pre-Procedure   Staff -        Anesthesiologist:  Aleida Jones MD       Performed By: anesthesiologist       Location: OB       Procedure Start/Stop Times: 5/3/2023 9:57 PM and 5/3/2023 10:13 PM       Pre-Anesthestic Checklist: patient identified, IV checked, risks and benefits discussed, informed consent, monitors and equipment checked, pre-op evaluation, at physician/surgeon's request and post-op pain management  Timeout:       Correct Patient: Yes        Correct Procedure: Yes        Correct Site: Yes        Correct Position: Yes   Procedure Documentation  Procedure: epidural catheter       Diagnosis: labor pain       Patient Position: sitting       Skin prep: Chloraprep       Local skin infiltrated with 3 mL of 1% lidocaine.        Insertion Site: L3-4. (right paramedian approach).       Technique: LORT saline        Needle Type: ClickBus       Needle Gauge: 18.        Needle Length (Inches): 3.5        Catheter: 20 G.          Catheter threaded easily.           Threaded 13 cm at skin.         # of attempts: 1 and  # of redirects:  0    Assessment/Narrative         Paresthesias: No.       Test dose of 3 mL lidocaine 1.5% w/ 1:200,000 epinephrine at.         Test dose negative, 3 minutes after injection, for signs of intravascular, subdural, or intrathecal injection.       Insertion/Infusion Method: LORT saline       Aspiration negative for Heme or CSF via Epidural Catheter.    Medication(s) Administered   0.25% Bupivacaine PF (Epidural) - EPIDURAL   5 mL - 5/3/2023 9:57:00 PM  2% Lidocaine w/ 1:200K Epi (EPIDURAL) - EPIDURAL   2 mL - 5/3/2023 9:57:00 PM  Medication Administration Time: 5/3/2023 9:57 PM      FOR [facility] ([facility]) ONLY:   Pain Team Contact information: please page the Pain Team Via [employer]. Search \"Pain\". During daytime hours, please page the attending first. At night please page the resident first.      "

## 2023-05-04 NOTE — PROGRESS NOTES
"Labor Progress Note:    Patient Name:  Mirian Cordova  :      1995  MRN:      [id number]    Assessment:    -27 year old  @ 40w5d  -Pitocin induction of labor, second stage  -Fetal status: FHT Category 2 with tachycardia  -Fetal tacycardia with low grade materanal temp, does not meet criteria at this time for Triple I  -GDMA1  -Rh negative status  -GBS negative    Plan:   -OB consultation: Please see Dr. Ricci's note for details.  Vacuum-assisted vaginal birth recommended.  -Transfer of care to Dr. Ricci at this time.  CNM to remain in supportive role.    Subjective:  After laboring down for approximately 1 hour, can began pushing with contractions.  She gave strong efforts and some fetal descent was noted.  Also noted.  She pushed in a variety of positions including Semi-Myles's, left and right lateral, hands and knees and squatting.  Payton continues to drain clear yellow urine.  Taking sips of p.o. fluids.  Receiving lots of support from her partner and support team.  Due to sustained fetal heart rate elevation in second stage, recommended consultation with in-house OB for evaluation of possible vacuum-assisted vaginal birth.  Reasoning discussed and and questions answered. Dr. Ricci consulted and summoned to bedside.    Objective:  /66   Pulse 69   Temp 100.1  F (37.8  C) (Oral)   Resp 16   Ht 1.6 m (5' 3\")   Wt 90.3 kg (199 lb)   LMP 07/15/2022 (Exact Date)   SpO2 98%   BMI 35.25 kg/m      FHR: at the beginning of second stage, FHR baseline 140 with moderate, no accels, variable decels present. FHR baseline increased to 180 over the course of an hour with sustained fetal tachycardia present with minimal variability and variable decels.   Contractions: q 2-3 minutes, 60-80 sec in duration, moderate to strong by IUPC. Soft resting tone.   Cervix: at the beginning of second stage, completely dilated with fetus at +1 station. After 1 hour of pushing, vertex had descended to +2 " station. Small caput present.  Pitocin: 12mU.    Total time spent with patient: 90 minutes, of which >50% time spent counseling and coordination of care.    Provider:  RAMIN Mariscal CNM    Date:  5/4/2023  Time:  4:00 AM

## 2023-05-04 NOTE — ANESTHESIA POSTPROCEDURE EVALUATION
Patient: Mirian Cordova    Procedure: * No procedures listed *       Anesthesia Type:  No value filed.    Note:  Disposition: Inpatient   Postop Pain Control: Uneventful            Sign Out: Well controlled pain   PONV:    Neuro/Psych:    Airway/Respiratory:    CV/Hemodynamics:    Other NRE:    DID A NON-ROUTINE EVENT OCCUR?     Event details/Postop Comments:  Doing well post epidural, patient without questions or concerns           Last vitals:  Vitals:    05/04/23 0522 05/04/23 0532 05/04/23 0537   BP:  100/61    Pulse:      Resp:      Temp:      SpO2: 98%  97%       Electronically Signed By: Aleida Jones MD  May 4, 2023  5:52 AM

## 2023-05-04 NOTE — LACTATION NOTE
This note was copied from a baby's chart.  Referred to Angelita for a feeding at the breast.She reported that baby has been spitty between feeding and more noticeable in the afternoon.     A full body stretch was done and this was demonstrated to parents. Baby twisted to the R on her own at this time as well we when holding the occipital bone. Massage of her neck also was demonstrated to parents.With a gloved finger, a suck assessment was attempted but baby continued to gag and arch backward with some cyanotic sx around her mouth. She was able to bring LC finger into her mouth but continued to gag once a finger was at the suck spot.      A feeding was attempted on the L breast with and without a NS without success. Hand expression was then done and droplets of colostrum were smeared on her gums and lips.    If baby continues to gag and act spitty, to lavage her stomach. Otherwise to offer the breast every two to three hours.     Outpt lactation and ECFE were dicussed with pt for after discharge.    To continue to follow while inpt.

## 2023-05-04 NOTE — L&D DELIVERY NOTE
Vaginal Delivery Note    Name: Mirian Cordova  : 1995  MRN: [id number]    PRE DELIVERY DIAGNOSIS  1) 27 year old  1 Para 0 at 40w5d      2) gestational diabetes, diet controlled    POST DELIVERY DIAGNOSIS  1) 27 year old  @ 40w5d  2) Delivery of a viable female infant weighing 7 pounds 4 ounces, 3300 grams, Apgars 8/9 via VAVD    Labor Event Times:    Labor Onset Date 2023       Labor Onset Time 12:00 AM    Dilation Complete Date 2023    Dilation Complete Time 1:39 AM       Start Pushing Date        Start Pushing Time        Labor Length:    1st Stage (hrs/min)       2nd Stage (hrs/min)       3rd Stage (hrs/min)           Augmentation No              Indication:   Induction Yes                      Indication: gestational diabetes, diet controlled    Monitors: External and IUPC     Lab Results   Component Value Date    AS Positive (A) 2023    HEPBANG Nonreactive 2022    HGB 13.0 2023     GBS: Negative    ROM: AROM  Fluid Type: Clear    Labor Analgesia/Anesthesia:Epidural    Cord pH obtained: No  Placenta submitted to Pathology: No    Description of procedure:   27 year old  with PNC w/ [place] CNMs and pregnancy complicated by GDM presented to L&D with plan for induction.  Her hospital course was uncomplicated.  Patient progressed to complete with artificial rupture of membranes, pitocin and mechanical ripening.   Shoulder Dystocia No  Operative Vaginal Delivery Yes   Kiwi mushroom vacuum was used secondary to fetal tachycardia.  Verbal consent given.  Pulled twice for 70 seconds total, no pop offs.  Infant spontaneously delivered over an 1st degree laceration and periurethral laceration on the left.  The first degree was repaired in the normal fashion using epidural anesthesia and local anesthesia using 3-0 vicryl.  The periurethral laceration wasn't bleeding and wasn't repaired.  Infant delivered in SEVERINO position.  Nuchal cord No but left ankle cord      Placenta spontaneously delivered: 2023  4:26 AM  grossly intact with 3 vessel cord.  Infant:  Live, vigorous infant  was handed to mom;  team was in the room.  Delivery was complicated by nothing Interventions included fundal massage and pitocin.    Delivery QBL (mL): 650    Mother and Infant stable.    CHANTEL ROMERO MD    2023 4:47 AM

## 2023-05-04 NOTE — PROGRESS NOTES
"Labor Progress Note:    Patient Name:  Mirian Cordova  :      1995  MRN:      [id number]    Assessment:    -27 year old  @ 40w4d  -Pitocin induction of labor, completely dilated  -Fetal status: FHT Category 1 with moderate variability  -s/p cervical ripening with cook catheter and 9 doses of PO cytotec  -GDMA1  -Rh negative status  -GBS negative    Plan:   -Disc option for passive fetal descent for 30-60 mn as Angelita tolerates. Plan to start pushing after that time or sooner with a strong urge.  -Continue pitocin for labor induction.     Subjective:  Angelita is feeling rectal pressure with contractions but not between. Comfortable with epidural. Payton draining clear urine. Dewayne resting on couch.     Objective:  /56   Pulse 70   Temp 98.2  F (36.8  C) (Oral)   Resp 16   Ht 1.6 m (5' 3\")   Wt 90.3 kg (199 lb)   LMP 07/15/2022 (Exact Date)   SpO2 98%   BMI 35.25 kg/m      FHR: 140 baseline, moderate variability, 15 X 15 accels present, early decels present.   Contractions: q 2-3 minutes, 60-80 sec in duration, strong by IUPC. Adequate MVUs. Soft resting tone.   Cervix: complete, 0 station.   Pitocin: 12mU    Total time spent with patient: 15 minutes, of which >50% time spent counseling and coordination of care.    Provider:  RAMIN Mariscal CNM    Date:  2023  Time:  1:48 AM        "

## 2023-05-04 NOTE — PROGRESS NOTES
"Labor Progress Note:    Patient Name:  Mirian Cordova  :      1995  MRN:      [id number]    Assessment:    -27 year old  @ 40w4d  -Pitocin induction of labor  -Fetal status: FHT Category 1 with moderate variability  -s/p cervical ripening with cook catheter and 9 doses of PO cytotec  -GDMA1  -Rh negative status  -GBS negative    Plan:   -Discussed option for AROM and IUPC now to augment induction. Process of both interventions reviewed in detail. Pt declines at this time.   -Continue with low-dose pitocin per guidelines.   -Routine CNM support & management. Encourage position changes, ambulation, rest as desired.  -Anticipate progress and NSVB.   -Reevaluate progress in 2-3 hours or sooner with a change in status.     Subjective:  Report received and care assumed from ANDRAE Calvo CNM at 18:00. Mirian is standing at the bedside breathing with her contractions. Has also been walking around the halls. Multiple friends and family members present. Taking normal PO fluid intake. Ate dinner.  Voiding without issue. Noting some bloody mucous with wiping. Denies leaking of fluid.     Objective:  /80 (BP Location: Right arm, Patient Position: Sitting, Cuff Size: Adult Regular)   Pulse 74   Temp 98.5  F (36.9  C) (Oral)   Resp 16   Ht 1.6 m (5' 3\")   Wt 90.3 kg (199 lb)   LMP 07/15/2022 (Exact Date)   SpO2 99%   BMI 35.25 kg/m      FHR: 130 baseline, moderate variability, 15 X 15 accels present, no decels present.   Contractions: q 1-5 minutes, 40-60 sec in duration, moderate to palpation. Soft resting tone.   Cervix: unchanged, 4cm/40%/-2, to matenal left, moderate. BOW intact.   Pitocin: 4mU    Total time spent with patient: 20 minutes, of which >50% time spent counseling and coordination of care.    Provider:  RAMIN Mariscal CNM    Date:  5/3/2023  Time:  7:00 PM        "

## 2023-05-04 NOTE — PROGRESS NOTES
"Labor Progress Note:    Patient Name:  Mirian Cordova  :      1995  MRN:      [id number]    Assessment:    -27 year old  @ 40w4d  -Pitocin induction of labor  -Fetal status: FHT Category 1 with moderate variability  -s/p cervical ripening with cook catheter and 9 doses of PO cytotec  -GDMA1  -Rh negative status  -GBS negative    Plan:   -Epidural per MDA.   -Reexamine cervix once comfortable after epidural.     Subjective:  Angelita has been using nitrous to cope with her stronger contractions. Side lying release performed with CNM for three contractions on each side. Soon after this was completed, she felt much more intense contractions and expressed interest in an epidural. Reporting contractions are becoming unbearable and she is crying in pain. Taking normal PO fluid intake.  Voiding without issue. Dewayne supportive at bedside.    Objective:  /58   Pulse 70   Temp 98.2  F (36.8  C) (Oral)   Resp 16   Ht 1.6 m (5' 3\")   Wt 90.3 kg (199 lb)   LMP 07/15/2022 (Exact Date)   SpO2 99%   BMI 35.25 kg/m      FHR: 130 baseline, moderate variability, 15 X 15 accels present, no decels present.   Contractions: q 1-5 minutes, 40-70 sec in duration, moderate to palpation. Soft resting tone.   Cervix: deferred   Pitocin: 12mU    Total time spent with patient: 30 minutes, of which >50% time spent counseling and coordination of care.    Provider:  RAMIN Mariscal CNM    Date:  5/3/2023  Time:  9:00 PM        "

## 2023-05-05 ENCOUNTER — LACTATION ENCOUNTER (OUTPATIENT)
Age: 28
End: 2023-05-05

## 2023-05-05 VITALS
BODY MASS INDEX: 33.45 KG/M2 | HEIGHT: 63 IN | TEMPERATURE: 98.2 F | OXYGEN SATURATION: 99 % | WEIGHT: 188.8 LBS | RESPIRATION RATE: 16 BRPM | SYSTOLIC BLOOD PRESSURE: 130 MMHG | HEART RATE: 82 BPM | DIASTOLIC BLOOD PRESSURE: 82 MMHG

## 2023-05-05 PROBLEM — O46.92 VAGINAL BLEEDING IN PREGNANCY, SECOND TRIMESTER: Status: RESOLVED | Noted: 2022-12-26 | Resolved: 2023-05-05

## 2023-05-05 PROBLEM — U07.1 INFECTION DUE TO 2019 NOVEL CORONAVIRUS: Status: RESOLVED | Noted: 2023-03-04 | Resolved: 2023-05-05

## 2023-05-05 LAB — HGB BLD-MCNC: 11.2 G/DL (ref 11.7–15.7)

## 2023-05-05 PROCEDURE — 99238 HOSP IP/OBS DSCHRG MGMT 30/<: CPT | Performed by: ADVANCED PRACTICE MIDWIFE

## 2023-05-05 PROCEDURE — 85018 HEMOGLOBIN: CPT | Performed by: OBSTETRICS & GYNECOLOGY

## 2023-05-05 PROCEDURE — 36415 COLL VENOUS BLD VENIPUNCTURE: CPT | Performed by: OBSTETRICS & GYNECOLOGY

## 2023-05-05 PROCEDURE — 250N000013 HC RX MED GY IP 250 OP 250 PS 637: Performed by: OBSTETRICS & GYNECOLOGY

## 2023-05-05 RX ORDER — IBUPROFEN 800 MG/1
800 TABLET, FILM COATED ORAL EVERY 6 HOURS PRN
COMMUNITY
Start: 2023-05-05 | End: 2023-06-14

## 2023-05-05 RX ORDER — MODIFIED LANOLIN
OINTMENT (GRAM) TOPICAL
COMMUNITY
Start: 2023-05-05 | End: 2023-05-11

## 2023-05-05 RX ORDER — ACETAMINOPHEN 325 MG/1
650 TABLET ORAL EVERY 4 HOURS PRN
COMMUNITY
Start: 2023-05-05 | End: 2023-06-14

## 2023-05-05 RX ORDER — DOCUSATE SODIUM 100 MG/1
100 CAPSULE, LIQUID FILLED ORAL DAILY
COMMUNITY
Start: 2023-05-05

## 2023-05-05 RX ADMIN — DOCUSATE SODIUM 100 MG: 100 CAPSULE, LIQUID FILLED ORAL at 09:00

## 2023-05-05 ASSESSMENT — ACTIVITIES OF DAILY LIVING (ADL)
ADLS_ACUITY_SCORE: 18

## 2023-05-05 NOTE — DISCHARGE INSTRUCTIONS
New Mother Care    Thank you for sharing your birth experience with the [facility].  Congratulations on the birth of your baby!    Postpartum Appointment:  You should have your postpartum appointment at six weeks after delivery.  If you had a  birth, you should have an appointment at two weeks with the physician who performed your surgery, and six weeks with the midwives. Call [phone] to schedule this appointment.     Lactation Appointment with CNM:   If you would like to make a clinic appointment for breastfeeding support with one of the Certified Nurse-Midwives who is also an International Board Certified Lactation Consultant, please call [phone].     Postpartum Changes:  You have experienced many physical and emotional changes during your pregnancy.  After delivery, you will notice other changes.  Here are some tips for common experiences after your baby is born.      Sign/Symptom Cause Suggestions   Mood Swings Hormonal changes, stress, fatigue Rest.  Ask for help.  Contact your health care provider if sadness continues more than two weeks, or caring for baby becomes overwhelming.   Engorged Breasts Swelling with more fluid and breast milk production two to five days after delivery.  May occur whether or not you are breastfeeding. Heat or ice for comfort.  If breastfeeding, nurse frequently.  Use supportive bra without underwire.  Should improve in one to two days.   After pains/ Cramping Cramping of uterus, often with nursing which stimulates the uterus to tighten.  Stronger with subsequent babies (second or more). Use non-aspirin pain reliever (ibuprofen or acetaminophen), especially before breastfeeding.  Empty your bladder frequently (at least every 2 hours).   Increased vaginal bleeding Too much physical activity; breastfeeding (due to uterine contractions). Rest more.  Avoid use of tampons.  Redness and amount of vaginal discharge (bleeding)  "decreases by three to four weeks after delivery.  Call clinic if you fill more than one pad within two hours.   Perineal discomfort and hemorrhoids Swelling from delivery; episiotomy or laceration (tearing); stitches. Ice, non-asprin pain reliever, witch hazel pads, warm tub soaks, stool softener, high fiber diet, kegel exercises. Stitches are absorbed.  Healing in two to three weeks. Do NOT use \"doughnut\" pillow for sitting.   Increased sweating and urinating Body losing extra fluid from pregnancy; hormonal shifts. Empy bladder more often, especially before breastfeeding; increase water intake; improves within three to four days.   Constipation Change in abdominal pressure and swelling after delivery; hormonal changes. Increase fluids and fiber in diet; Metamucil or stool softner as needed.   Skin coloring  Hair Thickness  Swelling Skin darkening and pregnancy rash due to hormonal changes; extra hair growth during pregnancy; increased fluids from pregnancy and birth causes swelling. Darker skin coloring and stretch marks with fade after delivery (no treatment needed).  Extra hair will be lost in two to four months.  Pregnancy swelling resolves in one to four weeks. Continue to hydrate.   Sciatica pain Nerve irritation due to extra weight and pressure during pregnancy. May continue after delivery; heat, acetaminophen, ibuprofen, position changes for comfort.     Postpartum Exercises  These exercises may be started soon after delivery.  If you feel tired or uncomfortable, stop and try these exercises after resting.  Check for any separation in your stomach wall before doing exercises that involve twising or stress on your stomach muscles.  Place your fingers in the center of your upper abdomen and lift your head and shoulders up in a partial sit-up.  If you feel a separation in your muscle wall, it is too soon to do sit ups.  Try the following instead:  Tighten and relax your pelvic floor muscles often.  Breathe " deeply while laying on your back.  As you breathe out, lift just your head up and pull the sides of your stomach toward the middle with your hands.  Then breathe in as you put your head down.  This will help to close the separation of your stomach.  Tilt your pelvis back and forth.  Alternate this with full body stretches.  Move your feet back and forth, then in circular motions.  While lying on your back, slide your heels toward your hips and bend your knees one at a time, then together.  While lying flat on the floor, bend your knees and raise your legs one at a time.  When the stomach wall separation has closed, you can progress to straight curl-ups, diagonal curl-ups, and side leg lifts.    After a  Birth  In addition to the instruction after a vaginal delivery, follow these guidelines:  Check your incision each day for signs of infection: redness, drainage, warmth or discomfort.  Contact your midwife or OB who performed your surgery if you have any of these signs or heavy vaginal bleeding.  Check with your midwife or surgeon before taking tub baths.  You may start driving a car two weeks after delivery.  Gradually increase your physical activity and exercise level according to how comfortable or tired you feel.  During the first days after delivery, try deep breathing, bending and stretching your feet in up-and-down circular motions, extending and tightening your legs while crossed at the ankles, and doing isometric exercises while lying down.  Next, try pelvic lifts with bent knees, bending and straightening your knees separately and together.  After checking for the abdominal rectus (stomach wall) separation, advance to back arching, twisting to each side, and reaching for your knees with pillow support.  Straight curl-ups, diagonal curl-ups and side leg lifts can then be added.    Reminders  Healthy nutrition is still important for your recovery and breastfeeding.  Continue your prenatal vitamins if  you are breastfeeding.  It is important for your health and your baby's health to stay smoke-free.  Babies exposed to smoke are sick more often.  Family planning is important.  Discuss birth control options with your provider.    Warning Signs  Call the on-call midwife if you have:  Heavy bleeding (filling one pad within one to two hours).  Blood clots larger than the size of a golf ball, especially with heavy bleeding.  Vaginal discharge with foul odor or green color.  Fever (oral temperature over 100.3 F).  Signs of bladder infection (burning, frequent urination)  Signs of vaginal infection (vaginal itching, irritation)  Painful, hard lump in breast and/or red streaks with fever.  Vaginal pain or tissue coming out of vagina.  Abdominal pain or abdomen tender to the touch.  Signs of depression or anxiety, affecting sleep or activities of daily living.    If you have a non-emergent question or would like to schedule a follow up appointment, please call the clinic midwife at [phone].    If you have questions or concerns and need to speak with a midwife immediately, please call the on-call midwife at [phone].        A Homecare Visit is set up on Sat, May 6th. The RN will call you after 4 p.m. the evening before the visit with a time. Please do not make a clinic visit for the same day as your Homecare Visit. [facility] at [phone] if you have any further questions related to the home visit.

## 2023-05-05 NOTE — PROGRESS NOTES
"Outreach Note for EPIC    Chart reviewed, discharge plan discussed with patient, needs assessed. Patient verbalizes understanding of plan, requests [facility] as ordered, [facility] nurse visit planned for Sat, May 6th, Home Care Intake updated. Patient and , \"Rosie\", phone number is reported as correct in EMR.    Patient states she has good support at home, has baby care essentials, and feels ready to discharge today. Outreach RN will continue to follow and assist if needed with discharge plan. No additional needs identified at this time.        "

## 2023-05-05 NOTE — PLAN OF CARE
Problem: Plan of Care - These are the overarching goals to be used throughout the patient stay.    Goal: Plan of Care Review  Description: The Plan of Care Review/Shift note should be completed every shift.  The Outcome Evaluation is a brief statement about your assessment that the patient is improving, declining, or no change.  This information will be displayed automatically on your shift note.  Outcome: Met  Goal: Absence of Hospital-Acquired Illness or Injury  Outcome: Met  Intervention: Prevent Skin Injury  Recent Flowsheet Documentation  Taken 2023 0900 by Moniac Navarro RN  Body Position: position changed independently  Goal: Optimal Comfort and Wellbeing  Outcome: Met  Goal: Readiness for Transition of Care  Outcome: Met     Problem: Postpartum (Vaginal Delivery)  Goal: Successful Maternal Role Transition  Outcome: Met  Goal: Hemostasis  Outcome: Met  Goal: Absence of Infection Signs and Symptoms  Outcome: Met  Goal: Optimal Pain Control and Function  Outcome: Met  Goal: Effective Urinary Elimination  Outcome: Met   Goal Outcome Evaluation:                  Pt VSS, fundus firm with scant bleeding.Pain controlled with ordered medications. Up independently in room and voiding well. Breastfeeding independently and attentive to all  cares. Feels ready to discharge home.

## 2023-05-05 NOTE — DISCHARGE SUMMARY
"Vaginal Delivery Postpartum Day 1/Discharge summary    Patient Name:  Mirian Cordova  :      1995  MRN:      [id number]    Assessment:   Day 1 postpartum, VAVD  Desires discharge today  Breastfeeding going well  Stable postpartum course.    Plan:    -New mom handout information reviewed. Discussed shawna care, breast care, pain management, breastfeeding initiation, bowel changes, return of fertility, postpartum exercises, postpartum mood changes and adjustments, postpartum blues vs. postpartum depression, sleep changes, required support. Written information provided.    -Discussed postpartum home visit- offered and accepted.     -Discharge to home later today    -Advised follow-up at 2 and 6 weeks postpartum for routine clinic visits.    Subjective:  Mirian Cordova is integrating birth experience; Feels ready for discharge--hoping to go home today. She is up and active in the room independently, is voiding and ambulating without difficulty without dizziness or calf pain. Tolerating normal diet and passing flatus. Has not had BM. Voiding without issue. Bleeding is light without clots. Pain is well controlled with current medications of ibuprofen/tylenol. Baby is breastfeeding on cue. Notes she may have a lip tie.  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  Postpartum contraception plans include IUD--Mirena.  Has used this method in the past and it worked well for her. Support at home identified--has local family and friends.  Partner will have approx 3 weeks off work and Mirian will be off until the fall.  Has h/o anxiety and depression, has counseling appt scheduled next week and has good support.    Objective:  /82 (BP Location: Right arm, Patient Position: Semi-Myles's, Cuff Size: Adult Regular)   Pulse 82   Temp 98.2  F (36.8  C) (Oral)   Resp 16   Ht 1.6 m (5' 3\")   Wt 90.3 kg (199 lb)   LMP 07/15/2022 (Exact Date)   SpO2 99%   Breastfeeding Unknown  "  BMI 35.25 kg/m    Patient Vitals for the past 24 hrs:   BP Temp Temp src Pulse Resp SpO2   05/05/23 0900 130/82 98.2  F (36.8  C) Oral 82 16 99 %   05/05/23 0015 114/68 98  F (36.7  C) Oral 70 18 98 %   05/04/23 1943 128/61 98.1  F (36.7  C) Oral 70 18 98 %   05/04/23 1300 135/60 98.4  F (36.9  C) Oral 75 16 98 %       General Appearance:    Alert, NAD   Remainder of exam deferred.  WNL per RN exam                  Hemoglobin   Date Value Ref Range Status   05/05/2023 11.2 (L) 11.7 - 15.7 g/dL Final     Treponema neg during pregnancy  GBS negative  Mother O negative, baby A negative, no Rhogam needed  Rubella Immune        Total time spent with patient:20 minutes, >50% time spent counseling and coordination of care.    Provider:  RAMIN Dunaway, MARINE    Date:  5/5/2023  Time:  10:28 AM

## 2023-05-09 ENCOUNTER — NURSE TRIAGE (OUTPATIENT)
Dept: NURSING | Facility: CLINIC | Age: 28
End: 2023-05-09
Payer: COMMERCIAL

## 2023-05-09 NOTE — TELEPHONE ENCOUNTER
"Had baby Thursday. (5 days ago Just passed large Quarter sized blood clot.  Has had some small pea sized clots. Bleeding has decreased to almost nothing.  Is using Ice packs and witch hazel pads . The pads will have slight red color and With urination, mostly clear. No abdominal pain. Is mildly Uncomfortable with \"rectal pressure\". R sided of Labia feels a bit of a \"sting\" compared to Left side.  Vaginal delivery. Is breastfeeding. Afebrile. (98.2).     Protocol reviewed, care advice given, disposition is: See PCP within 24 hours. Patient will call tomorrow with update and further recommendation for appointment. Call back with worsening symptoms, further questions/concerns    YORDAN HOLGUIN RN  [facility] nurse advisors  5/9/2023  6:37 PM      Reason for Disposition    [1] Passing blood clots AND [2] persists > 4 days postpartum    Additional Information    Negative: Shock suspected (e.g., cold/pale/clammy skin, too weak to stand, low BP, rapid pulse)    Negative: Difficult to awaken or acting confused (e.g., disoriented, slurred speech)    Negative: Passed out (i.e., lost consciousness, collapsed and was not responding)    Negative: Sounds like a life-threatening emergency to the triager    Negative: SEVERE dizziness (e.g., unable to stand, requires support to walk, feels like passing out now)    Negative: [1] SEVERE abdominal pain AND [2] present > 1 hour    Negative: Fever > 100.4 F (38.0 C)    Negative: SEVERE vaginal bleeding (e.g., soaking 2 pads or tampons per hour and present 2 or more hours; 1 menstrual cup every 2 hours)    Negative: Patient sounds very sick or weak to the triager    Negative: MODERATE vaginal bleeding (e.g., soaking 1 pad or tampon per hour and present > 6 hours; 1 menstrual cup every 6 hours)    Negative: [1] Constant abdominal pain AND [2] present > 2 hours    Negative: Pale skin (pallor) of new-onset or worsening    Negative: Foul smelling vaginal discharge (i.e., lochia)    " Negative: Passed tissue (e.g., gray-white)    Negative: Bleeding with > 6 soaked pads per day    Protocols used: POSTPARTUM - VAGINAL BLEEDING AND LOCHIA-A-AH

## 2023-05-10 ENCOUNTER — TELEPHONE (OUTPATIENT)
Dept: MIDWIFE SERVICES | Facility: CLINIC | Age: 28
End: 2023-05-10
Payer: COMMERCIAL

## 2023-05-10 NOTE — TELEPHONE ENCOUNTER
Telephone call to patient.  Pea-sized blood clots noted without an increase in vaginal discharge or clot size.  Feels rectal pressure in the morning and midday, eases after that.  Denies pain.  Vaginal birth with stitches.  Took stool softener and laxative, BM x2 today.  Sitz bath's 2-3 times per day, plain.  Recommend Epsom salt sitz bath's and regular stooling.  Contact on-call CNM if symptoms persist or worsen.  She has verbal understanding of and agrees with the plan of care.  Lactation appointment tomorrow.  Plans a 6-week postpartum visit with CNM or sooner as needed.

## 2023-05-10 NOTE — TELEPHONE ENCOUNTER
Pt had called triage during night about a blood clot.she passed post partum. They recommended she call today and check with CNM if she needs to be seen in clinic. Please call pt to discuss.

## 2023-05-10 NOTE — PROGRESS NOTES
"Assessment:   1.  One week old infant gaining weight very well on exclusive breastfeeding: now over birthweight  2.  Some difficulty with initial latch; able to latch with use of nipple shield today.  Good suck with milk transfer adequate to baby's needs during observed feeding in office today  3.  Mother with ample milk supply  4.  Mother with abrasions on breast from use of pump    Plan:   1.  Use good positioning for deep latch, with baby held close to body and baby's head/shoulders/hips in good alignment.  When in a seated position, use a pillow to help bring baby close to breasts, and stepstool to elevate your knees above hips.   2.   Present breast in the \"sandwich\" hold, compressing breast vertically and in line with baby's mouth, for baby to get a larger mouthful of breast and a deeper latch.  If there is pinching or pain, try using a finger to give a little gentle pressure on her chin to help her open more widely and take in more of your breast.  If it is still painful, use a finger to break the suction, remove baby from the breast and try again until there is no pain with nursing.  There is sometimes a little pain when the baby first begins sucking, but after the first few seconds there should be no pain--only a tugging feeling.  3.  Babies latch best to the breast by bringing their chin in first, so point your nipple towards baby's nose, tuck the chin in close, and then wait for her mouth to open.  When her mouth opens, bring her head in deeply.  Baby's chin should be snugged deeply in your breast, their upper cheeks should be touching the breast, and their nose just out of the breast.  4.  If Rosie still needs the nipple shield for some feedings or parts of feedings, that is OK.  Weaning from it can take some time.  5.  Continue to nurse baby on cue, 8-12 times each day.  Feed on one side until baby finishes swallowing. Once swallowing slows, use breast compression to encourage more swallowing, but " "once there is no more active swallowing, and baby is either sleeping, coming off the breast, or just \"nibbling,\" it is OK to use a finger to take baby off the breast and move to the other breast.  Do the same on the other side.  Offer both breasts at each feeding.  It is more important to watch the baby than the clock!   6.  Now that Rosie is nursing better and you have established a good milk supply, you can decrease your pumping efforts.  Use the Haakaa passive pump just if you need that milk.  For decreasing your use of the electric pump without causing too much fullness, slowly decrease the amount of milk you remove.  For example,   instead of pumping until 5 oz come, stop after 4.  Then drop back by another ounce, and then another, until you can comfortably drop that pumping entirely.   7.  Decreasing your supply to match Rosie's needs will help to make your breasts more comfortable.  If you have pain or lumpiness, you can use ice and ibuprofen to decrease the inflammation.  8.  Decreasing your use of the Haakaa will also help to heal the painful areas on your breasts.  Be sure when you use the electric pump that you are not pressing the flanges into your breast too firmly.  You can apply some antibiotic ointment to the scabbed areas to help with healing.  9.  Follow up with lactation as needed, and pediatric provider as planned.  [employer] can be used for brief questions, but it's important to know that messages are not seen Friday through Sunday. If urgent help is needed, Monday through Friday you can call [phone] and one of our lactation consultants will get the message and respond; if you need a rapid response over a weekend or holiday, it is best to call your on-call maternity or pediatric provider.  Please feel free to schedule a return visit if the concern is more detailed;  telephone visits are also an option if you don't feel you need to be seen in person.      Shobha Vela is here " "today because baby Rosie is having some difficulty latching.  Mirian feels that her nipples may be somewhat flat or \"shy,\" and Rosie is not achieving a deep latch, especially on the left side. Nursing is often painful, so is still using nipple shield for most feedings.  Mirian reports that her breasts have painful  \"marks. \"  Baby is also very sleepy and Mirian feels it is hard to awaken Rosie for feeding.   Finally, she has noticed some \"plugged ducts\"--has been pumping and massaging to help relieve these.    Mirian is vaccinated for Covid-19 and has received the bivalent booster.    Hospital Course: Induced for gestational diabetes with 24h cervical ripening and about 12 hours Pitocin.  Vacuum-assisted delivery for fetal tachycardia.  Seen by hospital IBCLC about 12 hours after birth, and   baby with some gagginess, arching and ?circumoral cyanosis. Attempted feeding but gaggy and poor suck;  Nipple shield not helpful. By day of discharge significantly improved, but homecare nurse noted jaundice (bili level of 16) and 9% weight loss.    Mother's Relevant Med/Surg History: Diet-controlled gestational diabetes, use of marijuana prior to pregnancy    Breast Surgery:  none    Breastfeeding Goals: continued exclusive breastfeeding    Previous Breastfeeding Experience: first baby    Infant's name: Rosie  Infant's bday: [birth year]  Gestational age: 40w5d  Infant's birth weight: 7 # 4.4 oz  Discharge weight: 6 # 13.3 oz  Recent weights:  Monday 5/6/23:  6 # 10 oz at home visit  5/8/23:  6 # 15 oz at pediatrics  5/10/23:  7 # 2.2 oz    Mode of delivery: vacuum-assisted vaginal  Pediatric Provider: [facility], Rohit Jenkins, CNP.  Mirian gives her permission for today's note to be forwarded to Mr. Jenkins.  JING signed and filed in Mirian's chart as Rosie has no local active pediatric chart.      Frequency and duration of feedings: every 2 - 3 hours, for around 20 min, usually taking both " breasts  Swallows audible per mother: unsure  Numbers of feedings in 24 hours: 10  Number urines per day: 5-8  Number of stools per day and their color: 5-8 yellow grainy    Supplementation: once daily, with about 2 oz  Pumping: about once/day, yielding 5 oz;  Using Spectra;  Also yielding 1-2 oz with Haakaa each feeding    Objective/Physical exam:   Mother: Noticed breasts grew larger and areolas darkened during pregnancy and she noticed primary engorgement when her milk came in on around day  Her nipples are everted, the areola is compressible, the breast is soft and full. Both breasts with scabs around partial circumference of areola    Sore nipples: tender  EPDS: 5    Assessment of infant: 41.69% Weight for age percentile   Age today: one week  Today's weight: 7 # 6.2 oz  Amount of milk transferred from LEFT side: 1 oz  Amount of milk transferred from RIGHT side: 0.6 oz    Baby has full flexion of arms and legs, normal tone, behavior is alert and active, respirations are normal, skin is normal, hydration is normal, jaw is normal size and alignment, palate is normal, frenulum is normal, baby can lateralize tongue, has adequate tongue lift, and tongue can protrude past bottom gum line. Upper labial frenulum is normal.    Suck exam:  Baby has strong, coordinated suck with good tongue cupping    Baby thrush: none   Jaundice: facial    Feeding assessment:  Baby can some difficulty attaining latch on first breast.  After several attempts, applied nipple shield and baby was able to latch and sustain suck.  On second breast, baby did latch without use of nipple shield and can hold suction with tongue while at the breast.     Alignment: The baby was flex relaxed. Baby's head was aligned with its trunk. Baby did face mother. Baby was in cross cradle position today.   Areolar Grasp: Baby was able to open mouth widely. Baby's lips were not pursed. Baby's lips did flange outward. Tongue was visible over bottom gum. Baby had  complete seal.     Areolar Compression: Baby made rhythmic motion. There were no clicking or smacking sounds. There was no severe nipple discomfort. Nipples appeared rounded after feeding.  Audible swallowing: Baby made quiet sounds of swallowing: There was an increase in frequency after milk ejection reflex. The milk ejection reflex is normal and milk supply is ample.    BP 94/60 (BP Location: Left arm, Patient Position: Sitting, Cuff Size: Adult Regular)   Pulse 96   LMP 07/15/2022 (Exact Date)   Breastfeeding Yes   OB History    Para Term  AB Living   1 1 1 0 0 1   SAB IAB Ectopic Multiple Live Births   0 0 0 0 1      # Outcome Date GA Lbr Pedro/2nd Weight Sex Delivery Anes PTL Lv   1 Term 23 40w5d 01:39 / 02:38 3.3 kg (7 lb 4.4 oz) F Vag-Vacuum EPI N SHASTA      Name: SHERRY JUNIOR-MELLO      Apgar1: 8  Apgar5: 9       Current Outpatient Medications:      acetaminophen (TYLENOL) 325 MG tablet, Take 2 tablets (650 mg) by mouth every 4 hours as needed for mild pain or fever (greater than or equal to 38  C /100.4  F (oral) or 38.5  C/ 101.4  F (core).), Disp: , Rfl:      docusate sodium (COLACE) 100 MG capsule, Take 1 capsule (100 mg) by mouth daily, Disp: , Rfl:      ibuprofen (ADVIL/MOTRIN) 800 MG tablet, Take 1 tablet (800 mg) by mouth every 6 hours as needed for other (cramping), Disp: , Rfl:      Prenatal Vit-Fe Fumarate-FA (PRENATAL VITAMIN PO), Take 1 split tablet by mouth daily, Disp: , Rfl:      witch hazel-glycerin (TUCKS) pad, Apply topically every hour as needed for hemorrhoids or other (episiotomy pain/itching), Disp: , Rfl:      lanolin ointment, Apply topically every hour as needed for other (sore nipples) (Patient not taking: Reported on 2023), Disp: , Rfl:   Past Medical History:   Diagnosis Date     Urinary tract infection     Reports many prior to pregnancy     Past Surgical History:   Procedure Laterality Date     LASER ABLATION OF THE CERVIX  2012     wisdom teeth  Bilateral 2011     Family History   Problem Relation Age of Onset     Cancer Mother      Substance Abuse Mother      Hypertension Father      Diabetes Type 1 Brother      Substance Abuse Brother      Early Death Brother      Substance Abuse Maternal Grandmother      Alcoholism Paternal Grandfather      Cancer Paternal Grandfather      Hypertension Paternal Grandfather        Time spent:  Chart review/prechartin min prior to day of service  Face-to-face visit:   64 min   Documentation:  20 min   Total time spent on day of service: 84 min    RAMIN Land, CNYOVANY, IBCLC

## 2023-05-11 ENCOUNTER — ALLIED HEALTH/NURSE VISIT (OUTPATIENT)
Dept: MIDWIFE SERVICES | Facility: CLINIC | Age: 28
End: 2023-05-11
Payer: COMMERCIAL

## 2023-05-11 VITALS — DIASTOLIC BLOOD PRESSURE: 60 MMHG | HEART RATE: 96 BPM | SYSTOLIC BLOOD PRESSURE: 94 MMHG

## 2023-05-11 DIAGNOSIS — O92.29 POSTPARTUM NIPPLE PAIN: Primary | ICD-10-CM

## 2023-05-11 PROCEDURE — 99417 PROLNG OP E/M EACH 15 MIN: CPT | Performed by: ADVANCED PRACTICE MIDWIFE

## 2023-05-11 PROCEDURE — 99215 OFFICE O/P EST HI 40 MIN: CPT | Performed by: ADVANCED PRACTICE MIDWIFE

## 2023-05-11 ASSESSMENT — EDINBURGH POSTNATAL DEPRESSION SCALE (EPDS)
TOTAL SCORE: 5
I HAVE FELT SAD OR MISERABLE: NOT VERY OFTEN
I HAVE BLAMED MYSELF UNNECESSARILY WHEN THINGS WENT WRONG: YES, SOME OF THE TIME
I HAVE BEEN ANXIOUS OR WORRIED FOR NO GOOD REASON: YES, SOMETIMES
I HAVE BEEN ABLE TO LAUGH AND SEE THE FUNNY SIDE OF THINGS: AS MUCH AS I ALWAYS COULD
I HAVE BEEN SO UNHAPPY THAT I HAVE HAD DIFFICULTY SLEEPING: NOT AT ALL
THINGS HAVE BEEN GETTING ON TOP OF ME: NO, I HAVE BEEN COPING AS WELL AS EVER
I HAVE FELT SCARED OR PANICKY FOR NO GOOD REASON: NO, NOT AT ALL
I HAVE BEEN SO UNHAPPY THAT I HAVE BEEN CRYING: NO, NEVER
THE THOUGHT OF HARMING MYSELF HAS OCCURRED TO ME: NEVER
I HAVE LOOKED FORWARD WITH ENJOYMENT TO THINGS: AS MUCH AS I EVER DID

## 2023-05-11 NOTE — PATIENT INSTRUCTIONS
"  1.  Use good positioning for deep latch, with baby held close to body and baby's head/shoulders/hips in good alignment.  When in a seated position, use a pillow to help bring baby close to breasts, and stepstool to elevate your knees above hips.   2.   Present breast in the \"sandwich\" hold, compressing breast vertically and in line with baby's mouth, for baby to get a larger mouthful of breast and a deeper latch.  If there is pinching or pain, try using a finger to give a little gentle pressure on her chin to help her open more widely and take in more of your breast.  If it is still painful, use a finger to break the suction, remove baby from the breast and try again until there is no pain with nursing.  There is sometimes a little pain when the baby first begins sucking, but after the first few seconds there should be no pain--only a tugging feeling.  3.  Babies latch best to the breast by bringing their chin in first, so point your nipple towards baby's nose, tuck the chin in close, and then wait for her mouth to open.  When her mouth opens, bring her head in deeply.  Baby's chin should be snugged deeply in your breast, their upper cheeks should be touching the breast, and their nose just out of the breast.  4.  If Rosie still needs the nipple shield for some feedings or parts of feedings, that is OK.  Weaning from it can take some time.  5.  Continue to nurse baby on cue, 8-12 times each day.  Feed on one side until baby finishes swallowing. Once swallowing slows, use breast compression to encourage more swallowing, but once there is no more active swallowing, and baby is either sleeping, coming off the breast, or just \"nibbling,\" it is OK to use a finger to take baby off the breast and move to the other breast.  Do the same on the other side.  Offer both breasts at each feeding.  It is more important to watch the baby than the clock!   6.  Now that Rosie is nursing better and you have established a good " milk supply, you can decrease your pumping efforts.  Use the Haakaa passive pump just if you need that milk.  For decreasing your use of the electric pump without causing too much fullness, slowly decrease the amount of milk you remove.  For example,   instead of pumping until 5 oz come, stop after 4.  Then drop back by another ounce, and then another, until you can comfortably drop that pumping entirely.   7.  Decreasing your supply to match Rosie's needs will help to make your breasts more comfortable.  If you have pain or lumpiness, you can use ice and ibuprofen to decrease the inflammation.  8.  Decreasing your use of the Haakaa will also help to heal the painful areas on your breasts.  Be sure when you use the electric pump that you are not pressing the flanges into your breast too firmly.  You can apply some antibiotic ointment to the scabbed areas to help with healing.  9.  Follow up with lactation as needed, and pediatric provider as planned.  [employer] can be used for brief questions, but it's important to know that messages are not seen Friday through Sunday. If urgent help is needed, Monday through Friday you can call [phone] and one of our lactation consultants will get the message and respond; if you need a rapid response over a weekend or holiday, it is best to call your on-call maternity or pediatric provider.  Please feel free to schedule a return visit if the concern is more detailed;  telephone visits are also an option if you don't feel you need to be seen in person.      __________________    This is a great video that shows a baby nursing well at the breast, and how to tell when baby is actively swallowing:      Is Your Baby Getting Enough Milk?   https://globalhealthmedia.org/videos/is-your-baby-getting-enough-milk/     ________________    For weaning from nipple shield, try without the shield when your baby is calm and willing to eat, but not frantic.  Sometimes it works best when they  "are sleepy, or even nearly asleep.     You can try starting some feedings without the shield in place.  If baby does not do well, you can move to using it to finish the feeding.  Alternatively, you can start the feeding with the shield in place and then taking it off once baby is more satisfied and calm and the milk is flowing well.  Some feedings may go well without the shield, and some not--don't panic!   Sometimes weaning from the shield can take a few weeks.  Be patient, because it is almost always successful in the end.    Here is an excellent article that goes through some steps for weaning from the shield:    https://[email or web address].Accessbio/blog/my-ten-step-process-for-weaning-from-the-nipple-shield/     _______________    Sometimes we can get an area of inflammation in the breast, with a lump, pain and sometimes redness.  This is often called a \"plugged duct,\" even though it is not actually a physical clogging of one milk duct.  It is comes from the milk ducts being narrowed by inflammation.  The main things to do are use some gentle heat on that area if it is comfortable for you, take ibuprofen or Tylenol if you need it for pain, and try some very gentle massage. The massage should be extremely light, like petting a cat, and go from the center of your breast outwards towards your armpit.  This helps the swelling and extra fluid move out of your breast.  You can also add a lecithin supplement, 1200 mg four times daily, to help with milk flow and reduce inflammation.  Continue to nurse your baby as you have been--you do not need to nurse more on that side, because increasing the milk supply will actually just worsen the problem.  If you are pumping, just pump the amount that your little one needs.      Occasionally if the inflammation increases, women can feel tired, achy or have a fever--this is called inflammatory mastitis, and it generally goes away within 24 hours as you do the things mentioned above.  " "If it doesn't get better, though, and you are feeling ill or have a fever for more than 24 hours, then you may have a bacterial infection that needs medication--so you should call your midwife or doctor about a possible prescription.   _________________     If you find that you are having a lot of uncomfortable leaking, you can just use a hand to put some pressure on your areolar area and this will stop the flow.  If you would like to collect the milk that is released with letdown without encouraging more supply, consider a type of  that does not use suction to stay in place.  The Haakaa Harika, [employer] Catch, and [employer] Milk-Saver can all be used in this way.  Alternatively, if you would like to use a  to gather enough milk for a bottle, you could use a type that stays in place with suction and draws out more milk.  The best way to use these is to nurse your baby on one side, and then when you move baby to the second side, place the  on the first side.  In this way baby always gets \"first choice.\"  Just collect the amount of milk that you will use.     ______________        This is an excellent series of articles on how to best offer a bottle to  (or any!) babies.      Bottlefeeding:  types of bottles, method for best bottlefeeding and bottle refusal       --------------------------------------------------------------------------------------------------------  Video demonstration of paced bottlefeeding         "

## 2023-05-22 ENCOUNTER — OFFICE VISIT (OUTPATIENT)
Dept: MIDWIFE SERVICES | Facility: CLINIC | Age: 28
End: 2023-05-22
Payer: COMMERCIAL

## 2023-05-22 VITALS
BODY MASS INDEX: 31.54 KG/M2 | HEIGHT: 63 IN | OXYGEN SATURATION: 99 % | WEIGHT: 178 LBS | DIASTOLIC BLOOD PRESSURE: 62 MMHG | HEART RATE: 61 BPM | SYSTOLIC BLOOD PRESSURE: 102 MMHG

## 2023-05-22 PROCEDURE — 99024 POSTOP FOLLOW-UP VISIT: CPT | Performed by: MIDWIFE

## 2023-05-22 ASSESSMENT — ANXIETY QUESTIONNAIRES
2. NOT BEING ABLE TO STOP OR CONTROL WORRYING: SEVERAL DAYS
IF YOU CHECKED OFF ANY PROBLEMS ON THIS QUESTIONNAIRE, HOW DIFFICULT HAVE THESE PROBLEMS MADE IT FOR YOU TO DO YOUR WORK, TAKE CARE OF THINGS AT HOME, OR GET ALONG WITH OTHER PEOPLE: NOT DIFFICULT AT ALL
6. BECOMING EASILY ANNOYED OR IRRITABLE: NOT AT ALL
3. WORRYING TOO MUCH ABOUT DIFFERENT THINGS: NOT AT ALL
GAD7 TOTAL SCORE: 2
GAD7 TOTAL SCORE: 2
4. TROUBLE RELAXING: NOT AT ALL
1. FEELING NERVOUS, ANXIOUS, OR ON EDGE: SEVERAL DAYS
5. BEING SO RESTLESS THAT IT IS HARD TO SIT STILL: NOT AT ALL
7. FEELING AFRAID AS IF SOMETHING AWFUL MIGHT HAPPEN: NOT AT ALL

## 2023-05-22 ASSESSMENT — EDINBURGH POSTNATAL DEPRESSION SCALE (EPDS)
I HAVE FELT SAD OR MISERABLE: NOT VERY OFTEN
I HAVE BEEN ABLE TO LAUGH AND SEE THE FUNNY SIDE OF THINGS: AS MUCH AS I ALWAYS COULD
I HAVE BEEN SO UNHAPPY THAT I HAVE HAD DIFFICULTY SLEEPING: NOT AT ALL
I HAVE LOOKED FORWARD WITH ENJOYMENT TO THINGS: AS MUCH AS I EVER DID
I HAVE BEEN SO UNHAPPY THAT I HAVE BEEN CRYING: NO, NEVER
I HAVE BLAMED MYSELF UNNECESSARILY WHEN THINGS WENT WRONG: NOT VERY OFTEN
TOTAL SCORE: 3
I HAVE FELT SCARED OR PANICKY FOR NO GOOD REASON: NO, NOT AT ALL
THINGS HAVE BEEN GETTING ON TOP OF ME: NO, I HAVE BEEN COPING AS WELL AS EVER
I HAVE BEEN ANXIOUS OR WORRIED FOR NO GOOD REASON: HARDLY EVER
THE THOUGHT OF HARMING MYSELF HAS OCCURRED TO ME: NEVER

## 2023-05-23 NOTE — PROGRESS NOTES
Assessment:     Normal postpartum recovery  S/P VAVD  S/p 1st degree tear and periurethral  History of anxiety and depression  Negative depression screen    Plan:       -Routine postpartum care  -Reviewed routine postpartum recovery including pelvic floor exercises and strengthening, use of ice and heat to aid in heaviness and tylenol/advil as needed for discomfort.    -Encouraged abstaining from IC with bleeding. Pelvic rest recommended.  -Continue seeing therapist for mental health check in.  -Outpatient lactation resources reviewed including [employer] [facility], La Leche [employer], community groups. Lactation referral provided as needed.  -Reviewed PP depression, anxiety, and psychosis s/sx, self care measures to reduce risk, safety plan and crisis numbers, and when to call for further resources. Written information provided for community resources and instructed to call on-call CNM with concerns or schedule clinic visit as needed.  -Return to clinic in 4 weeks for a routine exam following birth and insertion of Mirena IUD.      Subjective:      Mirian Cordova is a 27 year old female who presents for a postpartum follow up visit. She is 2 weeks postpartum following a  with 1st degree tear The delivery was at 40 5/7 gestational weeks. I have fully reviewed the prenatal and intrapartum course. The amount and color of the lochia is appropriate for the duration of recovery. She noted bright red bleeding on Saturday that is resolved now.  She is taking sitz baths 2 times a day.  She is noting significant heaviness in her pelvic region, denies vaginal itching or burning, denies problems with urination.  Mirian feels well and postpartum course has been stable. Baby Rosie's  course has been stable. The baby, is being fed by breastmilk. She denies breast discomfort, pain with feedings, concerns about baby's weight gain and concerns about milk supply. Voiding without difficulty, lochia normal,  tolerating normal diet, and passing flatus and normal bowel movements.  Pain is well controlled with current medications. Mirian  offers no emotional concerns.  Postpartum depression screening score: 3, negative #10. Plans IUD for birth control. Pt identifies family and friends as support system    The following portions of the patient's history were reviewed and updated as appropriate: allergies, current medications and problem list    Review of Systems  General:  Denies problem  Eyes: Denies problem  Ears/Nose/Throat: Denies problem  Cardiovascular: Denies problem  Respiratory:  Denies problem  Gastrointestinal:  Denies problem, Genitourinary: Denies problem  Musculoskeletal:  Denies problem  Skin: Denies problem  Neurologic: Denies problem  Psychiatric: Denies problem  Endocrine: Denies problem  Heme/Lymphatic: Denies problem   Allergic/Immunologic: Denies problem       Objective:        Physical Exam:  General Appearance: Alert, cooperative, no distress, appears stated age  Skin: Skin color, texture, turgor normal, no rashes or lesions.  Throat: Lips, mucosa, and tongue normal; teeth and gums normal  HEENT: grossly normal; otoscopic and opthalmic exam not performed.   Neck: Supple, symmetrical, trachea midline, no adenopathy  Respiratory: Normal respiratory effort  Cardiovascular: No peripheral edema.  Psych: Intact judgment and insight. OX3 and conversational.    15 minutes on the date of the encounter doing chart review, patient visit and documentation

## 2023-06-12 ENCOUNTER — TELEPHONE (OUTPATIENT)
Dept: MIDWIFE SERVICES | Facility: CLINIC | Age: 28
End: 2023-06-12
Payer: COMMERCIAL

## 2023-06-12 NOTE — TELEPHONE ENCOUNTER
Return call to patient.  Advised that the 2 hour glucose tolerance test does require that she fast for 8 hours before testing.  Patient verbalizes understanding to all and offers no further questions or concerns at this time.

## 2023-06-13 NOTE — PROGRESS NOTES
"Assessment:   1.  Six week old exclusively  infant gaining just under 1 oz/day, feeding slightly infrequently for age (6-8 times/day)  2.  Fair latch--slightly restless at breast, but good suck and normal milk transfer during feeding today  3.  Mother with normal milk supply  4.  Mother with likely nipple vasospasm    Plan:   1.  Continue to nurse baby on cue.  Feed on one side until baby finishes swallowing.  Once swallowing slows, use breast compression to encourage more swallowing, but once there is no more active swallowing, and baby is either sleeping, coming off the breast, or just \"nibbling,\" it is OK to use a finger to take baby off the breast and move to the other breast.  Do the same on the other side.  Offer both breasts at each feeding, but know that it is OK if Rosie just takes one side and seems content.    2.  Most babies do eat between 8 - 12 times/day, so if Rosie ever seems like she is less content between feedings or her weight gain is slowing, consider adding a \"Dream feed\" during the night instead of pumping.  You could then pump the side she does not nurse from if you would like to keep that milk.   3.  The burning of your nipples is likely to be related to nipple vasospasm.  Keep them warm after feedings, and consider adding a Vitamin B6 supplement.    4.  If you feel it is a the fast milk letdown making it difficult for Rosie to stay latched to the breast, try using the laid-back nursing position.  You can also use one hand to gently block some milk ducts during letdown and help baby more easily cope with flow.   5.  Once babies are 9-10 pounds, they need about 25-30 oz/day (or 3 - 4 oz each feeding if she is eating 8 times/day), and this where their needs stay for their entire first year;  so you don't need to keep producing more and more milk as they grow.  6.  If you choose to drink alcohol, the best way to do that when breastfeeding is just to take small amounts (ideally " "just one drink--not enough to feel \"tipsy\"), and wait about two hours after a drink to feed your baby.  Alcohol is present in the breastmilk as it is present in the bloodstream, but also is metabolized out of the breastmilk in about 2-3 hours, just as it is from the blood.  If there is no more alcohol in the bloodstream, there is no alcohol in the milk.  There is no need to \"pump and dump;\"  just wait a few hours before feeding baby again.  7.  Marijuana is transferred into the milk and does persist in parts of the body with a lot of fat, like breastmilk and the brain, so it is best to ensure your baby is not exposed to marijuana.  If you feel strongly that you would like to partake, then giving your baby previously-pumped milk is the safest option.  Would not recommend developing a significant oversupply for this purpose, though, as this can carry some risks for plugged ducts or mastitis, as well as uncomfortable breast fullness.  8.   Pumping should be comfortable, releasing milk without pain. When pumping, your nipple should be drawn into the flange tunnel, and your areola into the funnel-shaped area.  The nipple should be able to move freely in the tunnel and should not rub on the sides of the tunnel;  The areola should not be inside the tunnel.  The best time to check flange fit is after you have been pumping for a few minutes, because the nipple grows a bit while pumping (just as it does when we nurse, although we can't see that!)  It looks like the 24 or 28mm flanges are a good size for you.  9. Follow up with lactation as needed, and pediatric provider as planned.  [employer] can be used for brief questions, but it's important to know that messages are not seen Friday through Sunday. If urgent help is needed, Monday through Friday you can call [phone] and one of our lactation consultants will get the message and respond; if you need a rapid response over a weekend or holiday, it is best to call your " "on-call maternity or pediatric provider.  Please feel free to schedule a return visit if the concern is more detailed;  telephone visits are also an option if you don't feel you need to be seen in person.    Shobha Vela is here today for a follow-up visit. She was seen during her first postpartum week for concerns with latch, pain, nipple shield use and breast fullness.  Today Mirian reports that she is now nursing without the nipple shield and breasts are comfortable.  Overall nursing is going well, but she is having some \"burning\" nipple pain after feeding. This occurs after most feedings, and can last for up to an hour; in the last day or so she has seen some blanching of her nipple after feedings as well.  Wondering if she should move to exclusive pumping because of this.  She also notes that Rosie occasionally pulls back on the nipple or \"pops off\" and starts crying, which is frustrating.  She shares that she would like to provide Rosie for breastmilk for a full year, but would ideally like to stop direct breastfeeding sooner than that and feed with previously expressed milk so that she can resume occasional marijuana use.  It is important to Mirian to protect Rosie from influence of marijuana.    Mirian is vaccinated for Covid-19 and has received the bivalent booster.    Hospital Course: Induced for gestational diabetes with 24h cervical ripening and about 12 hours Pitocin.  Vacuum-assisted delivery for fetal tachycardia.  Seen by hospital IBCLC about 12 hours after birth, and baby with some gagginess.  By day of discharge significantly improved.    Mother's Relevant Med/Surg History: Diet-controlled gestational diabetes, depression/anxiety, use of marijuana prior to pregnancy    Breast Surgery:  none    Breastfeeding Goals: to provide milk for baby Rosie for a year, but not necessarily directly breastfeed-would like to be able use marijuana    Previous Breastfeeding Experience: first " baby    Infant's name: Rosie  Infant's bday: [birth year]  Gestational age: 40w5d  Infant's birth weight: 7 # 4.4 oz  Discharge weight: 6 # 13.3 oz  Recent weights:  Monday 5/6/23:  6 # 10 oz at home visit  5/8/23:  6 # 15 oz at pediatrics  5/10/23:  7 # 2.2 oz  5/11/23:  7 # 6.2 oz on this scale  6/2/23:  8 # 14 oz at pediatric office    Mode of delivery: vacuum-assisted vaginal  Pediatric Provider: [facility], Rohit Jenkins, CNP.  Mirian gives her permission for today's note to be forwarded to Travis Alice.  JING signed and filed in Mirian's chart as Rosie has no local active pediatric chart.    Frequency and duration of feedings:  every 3- 4 hours during the day, and stretch of 5-7 hours at night  Swallows audible per mother: yes  Numbers of feedings in 24 hours: 6-8  Number urines per day:  8  Number of stools per day and their color: multiple times, yellow    Supplementation: occasionally for convenience, taking 3 1/2 - 4 oz   Pumping: once during the nighttime or early morning if breasts very full, or if baby does not take second side:  , yielding 6-7 oz together;  Using Spectra or wearable pump    Objective/Physical exam:   Mother: Noticed breasts grew larger and areolas darkened during pregnancy and she noticed primary engorgement when her milk came in on around day  Her nipples are everted, the areola is compressible, the breast is soft and full. Nipples are completely healed.     Sore nipples: burning pain after feedings, more on right side    Assessment of infant: 34.67% Weight for age percentile   Age today: 6 week  Today's weight: 9 # 8 oz   Amount of milk transferred from LEFT side: 0.8 oz  Amount of milk transferred from RIGHT side: 1.6 oz    (Baby last nursed about 2 hours ago)    Baby has full flexion of arms and legs, normal tone, behavior is alert and active, respirations are normal, skin is normal, hydration is normal, jaw is normal size and alignment, palate is normal, frenulum is  normal, baby can lateralize tongue, has adequate tongue lift, and tongue can protrude past bottom gum line. Upper labial frenulum is normal.    Suck exam:  Baby did not have good seal on examining finger, but did have has strong, coordinated suck at the breast    Baby thrush: none   Jaundice: none    Feeding assessment:  Baby can hold suction with tongue while at the breast, but after several minutes of nursing did become restless, pulling on and off the breast and not continuing productive suckling.    Alignment: The baby was flex relaxed. Baby's head was aligned with its trunk. Baby did face mother. Baby was in cross cradle and laid-back positions today.   Areolar Grasp: Baby was able to open mouth widely. Baby's lips were not pursed. Baby's lips did flange outward. Tongue was visible over bottom gum. Baby had complete seal.     Areolar Compression: Baby made rhythmic motion. There were no clicking or smacking sounds. There was no severe nipple discomfort. Nipples appeared rounded after feeding; no blanching was seen at today's visit.    Audible swallowing: Baby made quiet sounds of swallowing: There was an increase in frequency after milk ejection reflex. The milk ejection reflex is normal and milk supply is normal.    /66 (BP Location: Right arm, Patient Position: Sitting, Cuff Size: Adult Regular)   Pulse 78   Breastfeeding Yes   OB History    Para Term  AB Living   1 1 1 0 0 1   SAB IAB Ectopic Multiple Live Births   0 0 0 0 1      # Outcome Date GA Lbr Pedro/2nd Weight Sex Delivery Anes PTL Lv   1 Term 23 40w5d 01:39 / 02:38 3.3 kg (7 lb 4.4 oz) F Vag-Vacuum EPI N SHASTA      Name: SHERRY JUNIOR-MELLO      Apgar1: 8  Apgar5: 9       Current Outpatient Medications:      docusate sodium (COLACE) 100 MG capsule, Take 1 capsule (100 mg) by mouth daily, Disp: , Rfl:      levonorgestrel (MIRENA) 52 MG (20 mcg/day) IUD, 1 each by Intrauterine route once Mirena IUD Inserted by Marilia Bain  MARINE on 6- [employer] Lot [id number] Exp 2024 OCT NDC 22164-061-69, Disp: , Rfl:      Prenatal Vit-Fe Fumarate-FA (PRENATAL VITAMIN PO), Take 1 split tablet by mouth daily, Disp: , Rfl:   No current facility-administered medications for this visit.  Past Medical History:   Diagnosis Date     Urinary tract infection     Reports many prior to pregnancy     Past Surgical History:   Procedure Laterality Date     LASER ABLATION OF THE CERVIX  2012     wisdom teeth Bilateral 2011     Family History   Problem Relation Age of Onset     Cancer Mother      Substance Abuse Mother      Hypertension Father      Diabetes Type 1 Brother      Substance Abuse Brother      Early Death Brother      Substance Abuse Maternal Grandmother      Alcoholism Paternal Grandfather      Cancer Paternal Grandfather      Hypertension Paternal Grandfather        Time spent:  Chart review/precharting: 10 min prior to day of service  Face-to-face visit:  55 min   Documentation:  17 min   Total time spent on day of service: 72 min    RAMIN Land, MARINE, IBCLC

## 2023-06-14 ENCOUNTER — PRENATAL OFFICE VISIT (OUTPATIENT)
Dept: MIDWIFE SERVICES | Facility: CLINIC | Age: 28
End: 2023-06-14
Payer: COMMERCIAL

## 2023-06-14 VITALS
HEIGHT: 63 IN | HEART RATE: 72 BPM | DIASTOLIC BLOOD PRESSURE: 66 MMHG | BODY MASS INDEX: 31.01 KG/M2 | WEIGHT: 175 LBS | SYSTOLIC BLOOD PRESSURE: 104 MMHG

## 2023-06-14 DIAGNOSIS — O24.410 DIET CONTROLLED GESTATIONAL DIABETES MELLITUS (GDM) IN THIRD TRIMESTER: Primary | ICD-10-CM

## 2023-06-14 DIAGNOSIS — Z30.430 ENCOUNTER FOR INSERTION OF INTRAUTERINE CONTRACEPTIVE DEVICE: ICD-10-CM

## 2023-06-14 DIAGNOSIS — Z30.430 ENCOUNTER FOR IUD INSERTION: ICD-10-CM

## 2023-06-14 PROBLEM — Z34.90 ENCOUNTER FOR INDUCTION OF LABOR: Status: RESOLVED | Noted: 2023-05-02 | Resolved: 2023-06-14

## 2023-06-14 PROBLEM — Z34.80 SUPERVISION OF OTHER NORMAL PREGNANCY, ANTEPARTUM: Status: RESOLVED | Noted: 2022-09-26 | Resolved: 2023-06-14

## 2023-06-14 PROBLEM — Z37.9 VACUUM-ASSISTED VAGINAL DELIVERY: Status: RESOLVED | Noted: 2023-05-04 | Resolved: 2023-06-14

## 2023-06-14 PROBLEM — O99.810 IMPAIRED GLUCOSE IN PREGNANCY, ANTEPARTUM: Status: RESOLVED | Noted: 2023-02-02 | Resolved: 2023-06-14

## 2023-06-14 PROBLEM — Z67.91 RH NEGATIVE STATE IN ANTEPARTUM PERIOD: Status: RESOLVED | Noted: 2022-12-27 | Resolved: 2023-06-14

## 2023-06-14 PROBLEM — O26.899 RH NEGATIVE STATE IN ANTEPARTUM PERIOD: Status: RESOLVED | Noted: 2022-12-27 | Resolved: 2023-06-14

## 2023-06-14 LAB
HCG UR QL: NEGATIVE
NON GESTATIONAL GTT 2 HR POST DOSE: 115 MG/DL (ref 60–199)
NON GESTATIONAL GTT FASTING: 75 MG/DL (ref 60–125)

## 2023-06-14 PROCEDURE — 81025 URINE PREGNANCY TEST: CPT | Performed by: MIDWIFE

## 2023-06-14 PROCEDURE — 58300 INSERT INTRAUTERINE DEVICE: CPT | Performed by: MIDWIFE

## 2023-06-14 PROCEDURE — 82947 ASSAY GLUCOSE BLOOD QUANT: CPT | Performed by: MIDWIFE

## 2023-06-14 PROCEDURE — 36415 COLL VENOUS BLD VENIPUNCTURE: CPT | Performed by: MIDWIFE

## 2023-06-14 PROCEDURE — 82950 GLUCOSE TEST: CPT | Performed by: MIDWIFE

## 2023-06-14 ASSESSMENT — ANXIETY QUESTIONNAIRES
3. WORRYING TOO MUCH ABOUT DIFFERENT THINGS: SEVERAL DAYS
GAD7 TOTAL SCORE: 3
IF YOU CHECKED OFF ANY PROBLEMS ON THIS QUESTIONNAIRE, HOW DIFFICULT HAVE THESE PROBLEMS MADE IT FOR YOU TO DO YOUR WORK, TAKE CARE OF THINGS AT HOME, OR GET ALONG WITH OTHER PEOPLE: NOT DIFFICULT AT ALL
7. FEELING AFRAID AS IF SOMETHING AWFUL MIGHT HAPPEN: NOT AT ALL
6. BECOMING EASILY ANNOYED OR IRRITABLE: NOT AT ALL
2. NOT BEING ABLE TO STOP OR CONTROL WORRYING: SEVERAL DAYS
5. BEING SO RESTLESS THAT IT IS HARD TO SIT STILL: NOT AT ALL
1. FEELING NERVOUS, ANXIOUS, OR ON EDGE: SEVERAL DAYS

## 2023-06-14 ASSESSMENT — PATIENT HEALTH QUESTIONNAIRE - PHQ9: 5. POOR APPETITE OR OVEREATING: NOT AT ALL

## 2023-06-14 NOTE — PROGRESS NOTES
Assessment:     Normal postpartum recovery  S/P VAVD  S/p 1st degree tear  history of depression and anxiety  Negative depression screen    Plan:       -Routine postpartum care  -Outpatient lactation resources reviewed including [employer] [facility], La Leche [employer], community groups. Lactation referral provided as needed.  -Reviewed PP depression, anxiety, and psychosis s/sx, self care measures to reduce risk, safety plan and crisis numbers, and when to call for further resources. Written information provided for community resources and instructed to call on-call CNM with concerns or schedule clinic visit as needed.  -Return to clinic in 1 year      Subjective:      Mirian Cordova is a 27 year old female who presents for a postpartum follow up visit. She is 6 weeks postpartum following a VAVD with 1st degree tear The delivery was at 41 gestational weeks. I have fully reviewed the prenatal and intrapartum course. The amount and color of the lochia is appropriate for the duration of recovery. Mirian feels well and postpartum course has been stable. Baby Rosie's  course has been stable. The baby, oRsie, is being fed by breast. She denies breast discomfort, pain with feedings, concerns about baby's weight gain and concerns about milk supply. Voiding without difficulty, lochia normal, tolerating normal diet, and passing flatus and normal bowel movements.  Pain is well controlled with current medications. Mirian  offers no emotional concerns.  Postpartum depression screening score: 2, negative #10. JOSE-7=3. Plans IUD for birth control. Pt identifies family and  as support system    The following portions of the patient's history were reviewed and updated as appropriate: allergies, current medications and problem list    Review of Systems  General:  Denies problem  Eyes: Denies problem  Ears/Nose/Throat: Denies problem  Cardiovascular: Denies problem  Respiratory:  Denies  problem  Gastrointestinal:  Denies problem, Genitourinary: Denies problem  Musculoskeletal:  Denies problem  Skin: Denies problem  Neurologic: Denies problem  Psychiatric: Denies problem  Endocrine: Denies problem  Heme/Lymphatic: Denies problem   Allergic/Immunologic: Denies problem       Objective:        Physical Exam:  General Appearance: Alert, cooperative, no distress, appears stated age  Skin: Skin color, texture, turgor normal, no rashes or lesions.  Throat: Lips, mucosa, and tongue normal; teeth and gums normal  HEENT: grossly normal; otoscopic and opthalmic exam not performed.   Neck: Supple, symmetrical, trachea midline, no adenopathy  Respiratory: Normal respiratory effort  Cardiovascular: No peripheral edema.  Breasts: No breast masses, tenderness, asymmetry, or nipple discharge.  Pelvic: external genitalia WDL, no evidence of laceration/well healed, uterus is small, mobile, non tender, anteverted, no adnexal masses left or right.  Musculoskeletal: No digital cyanosis. Normal gait and station. Moves all limbs freely.  Extremities: Extremities normal, atraumatic, no cyanosis or edema  Neuro: Cranial nerves II-XII grossly intact.  Psych: Intact judgment and insight. OX3 and conversational.    IUD Insertion Procedure Note    Pre-operative Diagnosis: desires IUD for contraception    Post-operative Diagnosis: normal    Indications: contraception    Procedure Details   Mirian was given an opportunity to ask questions about all forms of birth control, meaning all prescriptions, non-prescription, and natural methods. All of her questions were answered to her satisfaction and she understood all of those answers.  She understands that no method of birth control, except abstinence, is 100% effective against pregnancy or [place] sexually transmitted diseases, including Human Immunodeficiency Virus (HIV) infection that leads to the Acquired Immunodeficiency Syndrome (AIDS) disease. The following benefits,  risk/side effects, warning signs, control method, intrauterine decisions to discontinue use option, regarding the birth control method, intrauterine device, were explained to her before she voluntarily decided to use this method of birth control.  BENEFITS: The IUD is 97-99% effective if all the directions regarding its use are followed carfeully. It can be more effective if used with foam or condoms mid-cycle between periods. IUDs containing progestin may decrease menstrual flow and painful menstrual periods. The IUD provides longer protection from pregnancy ( Mirena - 7 years)  RISKS/ SIDE EFFECTS  1. Spotting, bleeding, hemorrhage or anemia  2. Cramping or pain  3. Partial or complete expulsion of device leading to pregnancy, the pregnancy ending in miscarriage  4. Lost IUD string or other string problems  5. Puncturing of the uterus, embedding, or cervical perforation  6. Increased risk of pelvic inflammatory disease  WARNING SIGNS: The patient was advised  to call the clinic if she has any of the following early warning signs:  P - Period late (pregnancy), abnormal spotting or bleeding  A - Abdominal pain, pain with intercourse  I - Infection exposure (such as gonorrhea), abnormal discharge  N - Not feeling well, fever, chills  S - String missing, shorter or longer  ALTERNATIVES: She received written information about other methods of birth control and she chose the IUD.  She understands that she should check for the IUD strings several times during the first few months after insertion and then after each monthly period or before intercourse.   DECISION TO DISCONTINUE USE: She understands that she may have the IUD removed at any time. She knows not try to remove the device and that it should be removed only by a medical provider. If she does not desire to become pregnant, she has been told she may request to have another IUD inserted or choose to use another method of birth control. If she wishes to become  pregnant, she understands most women not using birth control become pregnant within 12 months.UD.  Urine pregnancy test was done today and result was negative.  The risks (including infection, bleeding, pain, and uterine perforation) and benefits of the procedure were explained to the patient and Written informed consent was obtained.      Cervix cleansed with Betadine. Uterus sounded to 9 cm. IUD inserted without difficulty. String visible and trimmed. Patient tolerated procedure well.    IUD Information:  Mirena, Lot # [id number], Expiration date 10/31/2024.    Condition:  Stable    Complications:  None    Plan:    The patient was advised to call for any fever or for prolonged or severe pain or bleeding. She was advised to use OTC ibuprofen as needed for mild to moderate pain.     Total time with patient 40 minutes with >50% on education, counseling and coordination of care.  Marilia Bain DNP,APRN,CNM

## 2023-06-14 NOTE — PATIENT INSTRUCTIONS
-----------------------------------------------------------------------------------------------------------   (Before, during and after pregnancy)   MOOD DISORDER RESOURCES :  Are you feeling sad or depressed?   Do you feel more irritable or angry with those around you?   Are you having difficulty bonding with your baby?   Do you feel anxious or panicky?   Are you having problems with eating or sleeping?   Are you having upsetting thoughts that you can t get out of your mind?   Do you feel as if you are  out of control  or  going crazy ?   Do you feel like you never should have become a mother?   Are you worried that you might hurt your baby or yourself?    Any of these symptoms, and many more, could indicate that you have a form of  mood or anxiety disorder, such as postpartum depression. While many women experience some mild mood changes during or after the birth of a child, 15 to 20% of women experience more significant symptoms of depression or anxiety. Please know that with informed care you can prevent a worsening of these symptoms and can fully recover. There is no reason to continue to suffer.  Women of every culture, age, income level and race can develop  mood and anxiety disorders. Symptoms can appear any time during pregnancy and the first 12 months after childbirth. There are effective and well-researched treatment options to help you recover. Although the term  postpartum depression  is most often used, there are actually several forms of illness that women may experience.    Resources:    -Pregnancy and Postpartum Support [place]: www.ppsupport[place].org  Who We Are: We are a group of mental health &  practitioners, service organizations, and mother volunteers who provides services to those struggling with a pregnancy, loss, or postpartum mood disorder through the Helpline, professional training, our resource list and website.  What We Do: We provide support,  "advocacy, awareness, and training about  mental health in [place].     Community Resource List:   This is a list of  resources within our community.   [email or web address]    PSYCHOTHERAPISTS/CONSULTANTS   This is a list of licensed mental health professionals who have advanced clinical skills in the treatment of postpartum mood and anxiety disorders (PMADs).   [email or web address]   INTEGRATIVE MEDICINE PRACTITIONERS:   This is a list of licensed providers who practice Integrative Medicine: a form of treatment that combines alternative medicine with evidence based medicine in an effort to treat the  whole person  (the person, not just the disease).   [email or web address]    PSYCHIATRIC CARE   This is a list of licensed Psychiatrists who have advanced clinical skills in the treatment and medication management for PMADs and lactating mothers.   [email or web address]   Click on the links below to find detailed profiles and contact information of providers who have been screened and approved as having advanced training in the area of PMADs. Please note: [facility] does not endorse a specific provider.   The list is in alphabetical order by city. You can also search for providers by city or zip code using the search box. Please click on the \"Show\" box to the upper right to advance to the next page. You may also call our Helpline at [phone] if you would like for our Helpline volunteer to find a provider for you.    -Postpartum Depression Support Group:   Weekly groups at no cost through [facility] [place], , 1:30-3:00 p.m., at [facility], [address], [address]. [place], , 1:30-3:00 p.m., at [facility], [address] To register for the group or get " more information, call [phone].   -Postpartum Depression Support Group:   Outpatient Intensive Treatment program for women with  mood disorders [facility]     -[facility]  Resource Guide     Women s Mental Health at [facility]  This website provides a range of current information including discussion of new research findings in women s mental health and how such investigations inform day-to-day clinical practice. Despite the growing number of studies being conducted in women s health, the clinical implications of such work are frequently controversial, leaving patients with questions regarding the most appropriate path to follow. Providing these resources to patients and their doctors so that individual clinical decisions can be made in a thoughtful and collaborative fashion dovetails with the mission of our Center.    [email or web address].org/      If you re having thoughts of harming yourself or your baby, it is vital to get support immediately. Call 911 or go to the nearest E.R.     TOLL-FREE / NATIONWIDE EMERGENCY ASSISTANCE   Immediate Emergency:  911   National Suicide Prevention Lifeline:   1-709.859.3226   Suicide Prevention Hotline:   1-108-UAXZFTP   National Postpartum Depression Hotline:   -PPD-MOMS   Postpartum Support International (PSI)   PPD Helpline: (not a 24-hour hotline)   1-884.863.3761

## 2023-06-15 ENCOUNTER — ALLIED HEALTH/NURSE VISIT (OUTPATIENT)
Dept: MIDWIFE SERVICES | Facility: CLINIC | Age: 28
End: 2023-06-15
Payer: COMMERCIAL

## 2023-06-15 VITALS — SYSTOLIC BLOOD PRESSURE: 104 MMHG | HEART RATE: 78 BPM | DIASTOLIC BLOOD PRESSURE: 66 MMHG

## 2023-06-15 DIAGNOSIS — O92.79 OTHER DISORDERS OF LACTATION: Primary | ICD-10-CM

## 2023-06-15 PROCEDURE — 99417 PROLNG OP E/M EACH 15 MIN: CPT | Performed by: ADVANCED PRACTICE MIDWIFE

## 2023-06-15 PROCEDURE — 99215 OFFICE O/P EST HI 40 MIN: CPT | Performed by: ADVANCED PRACTICE MIDWIFE

## 2023-06-15 ASSESSMENT — ANXIETY QUESTIONNAIRES: GAD7 TOTAL SCORE: 3

## 2023-06-15 NOTE — PATIENT INSTRUCTIONS
"  1.  Continue to nurse baby on cue.  Feed on one side until baby finishes swallowing.  Once swallowing slows, use breast compression to encourage more swallowing, but once there is no more active swallowing, and baby is either sleeping, coming off the breast, or just \"nibbling,\" it is OK to use a finger to take baby off the breast and move to the other breast.  Do the same on the other side.  Offer both breasts at each feeding, but know that it is OK if Rosie just takes one side and seems content.    2.  Most babies do eat between 8 - 12 times/day, so if Rosie ever seems like she is less content between feedings or her weight gain is slowing, consider adding a \"Dream feed\" during the night instead of pumping.  You could then pump the side she does not nurse from if you would like to keep that milk.   3.  The burning of your nipples is likely to be related to nipple vasospasm.  Keep them warm after feedings, and consider adding a Vitamin B6 supplement.    4.  If you feel it is a the fast milk letdown making it difficult for Rosie to stay latched to the breast, try using the laid-back nursing position.  You can also use one hand to gently block some milk ducts during letdown and help baby more easily cope with flow.   5.  Once babies are 9-10 pounds, they need about 25-30 oz/day (or 3 - 4 oz each feeding if she is eating 8 times/day), and this where their needs stay for their entire first year;  so you don't need to keep producing more and more milk as they grow.  6.  If you choose to drink alcohol, the best way to do that when breastfeeding is just to take small amounts (ideally just one drink--not enough to feel \"tipsy\"), and wait about two hours after a drink to feed your baby.  Alcohol is present in the breastmilk as it is present in the bloodstream, but also is metabolized out of the breastmilk in about 2-3 hours, just as it is from the blood.  If there is no more alcohol in the bloodstream, there is no " "alcohol in the milk.  There is no need to \"pump and dump;\"  just wait a few hours before feeding baby again.  7.  Marijuana is transferred into the milk and does persist in parts of the body with a lot of fat, like breastmilk and the brain, so it is best to ensure your baby is not exposed to marijuana.  If you feel strongly that you would like to partake, then giving your baby previously-pumped milk is the safest option.  Would not recommend developing a significant oversupply for this purpose, though, as this can carry some risks for plugged ducts or mastitis, as well as uncomfortable breast fullness.  8.   Pumping should be comfortable, releasing milk without pain. When pumping, your nipple should be drawn into the flange tunnel, and your areola into the funnel-shaped area.  The nipple should be able to move freely in the tunnel and should not rub on the sides of the tunnel;  The areola should not be inside the tunnel.  The best time to check flange fit is after you have been pumping for a few minutes, because the nipple grows a bit while pumping (just as it does when we nurse, although we can't see that!)  It looks like the 24 or 28mm flanges are a good size for you.  9. Follow up with lactation as needed, and pediatric provider as planned.  [employer] can be used for brief questions, but it's important to know that messages are not seen Friday through Sunday. If urgent help is needed, Monday through Friday you can call [phone] and one of our lactation consultants will get the message and respond; if you need a rapid response over a weekend or holiday, it is best to call your on-call maternity or pediatric provider.  Please feel free to schedule a return visit if the concern is more detailed;  telephone visits are also an option if you don't feel you need to be seen in person.    _________________        For managing vasospasm:   - adjust position to reduce fast flow (laid back, football hold with baby upright " and mom reclining).   - start w/ less painful side first   - Heating pad on low (or hand warmer, rice sock) placed over clothing on breasts right after pumping or nursing.  Some people find jason wool nursing pads very helpful.  - Keep nipples moist and warm. No air drying. Protect from friction. Use light touch.   - Consider large pumping flanges   - Pumping one side at a time and cover other side w/ heating pad   - Mindfulness/meditation, other calming strategies   - Ibuprofen/acetaminophen for pain  - Try Vit B 6 supplement:  200 mg/day for four days, then 25-50 mg daily (this dose can be taken as part of a B- complex vitamin)  - Limit caffeine   - If pain is intolerable, a medication can be considered.

## 2023-10-22 ENCOUNTER — HEALTH MAINTENANCE LETTER (OUTPATIENT)
Age: 28
End: 2023-10-22

## 2024-12-15 ENCOUNTER — HEALTH MAINTENANCE LETTER (OUTPATIENT)
Age: 29
End: 2024-12-15

## 2025-07-02 NOTE — LACTATION NOTE
This note was copied from a baby's chart.  A f/u consult was done with Angelita. She reported that since LC was in the room yesterday, baby was nursing much better. They were also able to try feedings without a NS.     At time of consult, both breasts were nursed. Angelita wanted to nurse in the football  hold on the L and cross cradle on the R. Swallows were pointed out with breast compression on each side. Angelita mentioned some breast tenderness and Mother Love cream was given to her to use after feedings.    To have a home care nurse visit in the next few days. Encouraged to use oupt resources as well that were discussed.  
actual